# Patient Record
Sex: FEMALE | Race: WHITE | Employment: OTHER | URBAN - METROPOLITAN AREA
[De-identification: names, ages, dates, MRNs, and addresses within clinical notes are randomized per-mention and may not be internally consistent; named-entity substitution may affect disease eponyms.]

---

## 2017-05-30 PROBLEM — Z96.649 S/P HIP REPLACEMENT: Status: ACTIVE | Noted: 2017-05-30

## 2018-01-01 ENCOUNTER — APPOINTMENT (OUTPATIENT)
Dept: GENERAL RADIOLOGY | Age: 73
DRG: 871 | End: 2018-01-01
Payer: MEDICARE

## 2018-01-01 ENCOUNTER — APPOINTMENT (OUTPATIENT)
Dept: GENERAL RADIOLOGY | Age: 73
DRG: 949 | End: 2018-01-01
Attending: PHYSICAL MEDICINE & REHABILITATION
Payer: MEDICARE

## 2018-01-01 ENCOUNTER — APPOINTMENT (OUTPATIENT)
Dept: GENERAL RADIOLOGY | Age: 73
DRG: 478 | End: 2018-01-01
Attending: HOSPITALIST
Payer: MEDICARE

## 2018-01-01 ENCOUNTER — HOSPITAL ENCOUNTER (OUTPATIENT)
Dept: CT IMAGING | Age: 73
Discharge: HOME | End: 2018-09-08
Payer: MEDICARE

## 2018-01-01 ENCOUNTER — APPOINTMENT (OUTPATIENT)
Dept: CT IMAGING | Age: 73
DRG: 871 | End: 2018-01-01
Payer: MEDICARE

## 2018-01-01 ENCOUNTER — HOSPITAL ENCOUNTER (INPATIENT)
Age: 73
LOS: 12 days | Discharge: NURSING, REHAB OR LONG-TERM CARE | DRG: 478 | End: 2018-06-08
Attending: HOSPITALIST | Admitting: HOSPITALIST
Payer: MEDICARE

## 2018-01-01 ENCOUNTER — APPOINTMENT (OUTPATIENT)
Dept: MRI IMAGING | Age: 73
DRG: 478 | End: 2018-01-01
Attending: HOSPITALIST
Payer: MEDICARE

## 2018-01-01 ENCOUNTER — ANESTHESIA EVENT (OUTPATIENT)
Dept: OPERATING ROOM | Age: 73
DRG: 478 | End: 2018-01-01
Payer: MEDICARE

## 2018-01-01 ENCOUNTER — ANESTHESIA (OUTPATIENT)
Dept: OPERATING ROOM | Age: 73
DRG: 478 | End: 2018-01-01
Payer: MEDICARE

## 2018-01-01 ENCOUNTER — HOSPITAL ENCOUNTER (INPATIENT)
Age: 73
LOS: 7 days | DRG: 871 | End: 2018-12-14
Attending: EMERGENCY MEDICINE | Admitting: INTERNAL MEDICINE
Payer: MEDICARE

## 2018-01-01 ENCOUNTER — HOSPITAL ENCOUNTER (EMERGENCY)
Age: 73
Discharge: HOME | End: 2018-11-18
Attending: EMERGENCY MEDICINE
Payer: MEDICARE

## 2018-01-01 ENCOUNTER — HOSPITAL ENCOUNTER (OUTPATIENT)
Dept: RADIATION ONCOLOGY | Age: 73
Discharge: HOME | End: 2018-06-01
Payer: MEDICARE

## 2018-01-01 ENCOUNTER — HOSPITAL ENCOUNTER (INPATIENT)
Age: 73
LOS: 27 days | Discharge: HOME HEALTH | DRG: 949 | End: 2018-07-05
Attending: PHYSICAL MEDICINE & REHABILITATION | Admitting: PHYSICAL MEDICINE & REHABILITATION
Payer: MEDICARE

## 2018-01-01 ENCOUNTER — HOSPITAL ENCOUNTER (OUTPATIENT)
Age: 73
Discharge: HOME | End: 2018-11-15
Payer: MEDICARE

## 2018-01-01 VITALS
HEIGHT: 59 IN | WEIGHT: 159.7 LBS | SYSTOLIC BLOOD PRESSURE: 116 MMHG | TEMPERATURE: 97.8 F | OXYGEN SATURATION: 95 % | HEART RATE: 81 BPM | DIASTOLIC BLOOD PRESSURE: 60 MMHG | RESPIRATION RATE: 18 BRPM | BODY MASS INDEX: 32.2 KG/M2

## 2018-01-01 VITALS
OXYGEN SATURATION: 94 % | BODY MASS INDEX: 31.29 KG/M2 | TEMPERATURE: 97.6 F | HEART RATE: 80 BPM | HEIGHT: 59 IN | DIASTOLIC BLOOD PRESSURE: 61 MMHG | WEIGHT: 155.2 LBS | RESPIRATION RATE: 16 BRPM | SYSTOLIC BLOOD PRESSURE: 110 MMHG

## 2018-01-01 VITALS
DIASTOLIC BLOOD PRESSURE: 82 MMHG | TEMPERATURE: 98.2 F | SYSTOLIC BLOOD PRESSURE: 131 MMHG | OXYGEN SATURATION: 99 % | RESPIRATION RATE: 10 BRPM

## 2018-01-01 VITALS
RESPIRATION RATE: 29 BRPM | SYSTOLIC BLOOD PRESSURE: 79 MMHG | TEMPERATURE: 99.2 F | HEIGHT: 59 IN | WEIGHT: 150 LBS | BODY MASS INDEX: 30.24 KG/M2 | DIASTOLIC BLOOD PRESSURE: 46 MMHG | HEART RATE: 110 BPM | OXYGEN SATURATION: 73 %

## 2018-01-01 VITALS
HEIGHT: 59 IN | HEART RATE: 102 BPM | DIASTOLIC BLOOD PRESSURE: 67 MMHG | BODY MASS INDEX: 30.24 KG/M2 | WEIGHT: 150 LBS | SYSTOLIC BLOOD PRESSURE: 108 MMHG | TEMPERATURE: 99.1 F | OXYGEN SATURATION: 94 % | RESPIRATION RATE: 14 BRPM

## 2018-01-01 DIAGNOSIS — C34.90 MALIGNANT NEOPLASM OF LUNG, UNSPECIFIED LATERALITY, UNSPECIFIED PART OF LUNG (HCC): ICD-10-CM

## 2018-01-01 DIAGNOSIS — A41.9 SEPSIS, DUE TO UNSPECIFIED ORGANISM: ICD-10-CM

## 2018-01-01 DIAGNOSIS — N39.0 URINARY TRACT INFECTION ASSOCIATED WITH INDWELLING URETHRAL CATHETER, INITIAL ENCOUNTER (HCC): ICD-10-CM

## 2018-01-01 DIAGNOSIS — J96.01 ACUTE RESPIRATORY FAILURE WITH HYPOXIA (HCC): Primary | ICD-10-CM

## 2018-01-01 DIAGNOSIS — C79.51 METASTATIC CANCER TO SPINE (HCC): Primary | ICD-10-CM

## 2018-01-01 DIAGNOSIS — C79.51 METASTATIC CANCER TO SPINE (HCC): ICD-10-CM

## 2018-01-01 DIAGNOSIS — J18.9 PNEUMONIA DUE TO ORGANISM: ICD-10-CM

## 2018-01-01 DIAGNOSIS — T83.511A URINARY TRACT INFECTION ASSOCIATED WITH INDWELLING URETHRAL CATHETER, INITIAL ENCOUNTER (HCC): ICD-10-CM

## 2018-01-01 DIAGNOSIS — D49.7 SPINAL CORD NEOPLASM: ICD-10-CM

## 2018-01-01 DIAGNOSIS — T83.9XXA PROBLEM WITH URINARY CATHETER (HCC): Primary | ICD-10-CM

## 2018-01-01 LAB
ALBUMIN SERPL-MCNC: 1.8 G/DL (ref 3.5–5.2)
ALBUMIN SERPL-MCNC: 2.5 G/DL (ref 3.5–5.2)
ALBUMIN SERPL-MCNC: 3.3 G/DL (ref 3.5–5.2)
ALP BLD-CCNC: 53 U/L (ref 35–104)
ALP BLD-CCNC: 54 U/L (ref 35–104)
ALP BLD-CCNC: 75 U/L (ref 35–104)
ALT SERPL-CCNC: 20 U/L (ref 0–32)
ALT SERPL-CCNC: 6 U/L (ref 0–32)
ALT SERPL-CCNC: 6 U/L (ref 0–32)
ANION GAP SERPL CALCULATED.3IONS-SCNC: 12 MMOL/L (ref 7–16)
ANION GAP SERPL CALCULATED.3IONS-SCNC: 13 MMOL/L (ref 7–16)
ANION GAP SERPL CALCULATED.3IONS-SCNC: 13 MMOL/L (ref 7–16)
ANION GAP SERPL CALCULATED.3IONS-SCNC: 14 MMOL/L (ref 7–16)
ANION GAP SERPL CALCULATED.3IONS-SCNC: 15 MMOL/L (ref 7–16)
ANION GAP SERPL CALCULATED.3IONS-SCNC: 16 MMOL/L (ref 7–16)
ANION GAP SERPL CALCULATED.3IONS-SCNC: 7 MMOL/L (ref 7–16)
ANION GAP SERPL CALCULATED.3IONS-SCNC: 7 MMOL/L (ref 7–16)
ANION GAP SERPL CALCULATED.3IONS-SCNC: 8 MMOL/L (ref 7–16)
ANION GAP SERPL CALCULATED.3IONS-SCNC: 8 MMOL/L (ref 7–16)
ANION GAP SERPL CALCULATED.3IONS-SCNC: 9 MMOL/L (ref 7–16)
ANION GAP SERPL CALCULATED.3IONS-SCNC: 9 MMOL/L (ref 7–16)
ANISOCYTOSIS: ABNORMAL
AST SERPL-CCNC: 11 U/L (ref 0–31)
AST SERPL-CCNC: 14 U/L (ref 0–31)
AST SERPL-CCNC: 17 U/L (ref 0–31)
ATYPICAL LYMPHOCYTE RELATIVE PERCENT: 0.9 % (ref 0–4)
B.E.: -2.2 MMOL/L (ref -3–3)
B.E.: 0.2 MMOL/L (ref -3–3)
BACTERIA: ABNORMAL /HPF
BASOPHILS ABSOLUTE: 0 E9/L (ref 0–0.2)
BASOPHILS RELATIVE PERCENT: 0 % (ref 0–2)
BASOPHILS RELATIVE PERCENT: 0.1 % (ref 0–2)
BASOPHILS RELATIVE PERCENT: 0.2 % (ref 0–2)
BASOPHILS RELATIVE PERCENT: 0.2 % (ref 0–2)
BASOPHILS RELATIVE PERCENT: 0.3 % (ref 0–2)
BASOPHILS RELATIVE PERCENT: 0.3 % (ref 0–2)
BASOPHILS RELATIVE PERCENT: 0.5 % (ref 0–2)
BASOPHILS RELATIVE PERCENT: 0.8 % (ref 0–2)
BILIRUB SERPL-MCNC: 0.4 MG/DL (ref 0–1.2)
BILIRUB SERPL-MCNC: 0.7 MG/DL (ref 0–1.2)
BILIRUB SERPL-MCNC: <0.2 MG/DL (ref 0–1.2)
BILIRUBIN URINE: NEGATIVE
BLOOD CULTURE, ROUTINE: ABNORMAL
BLOOD, URINE: ABNORMAL
BLOOD, URINE: ABNORMAL
BLOOD, URINE: NEGATIVE
BUN BLDV-MCNC: 11 MG/DL (ref 8–23)
BUN BLDV-MCNC: 12 MG/DL (ref 8–23)
BUN BLDV-MCNC: 14 MG/DL (ref 8–23)
BUN BLDV-MCNC: 17 MG/DL (ref 8–23)
BUN BLDV-MCNC: 22 MG/DL (ref 8–23)
BUN BLDV-MCNC: 5 MG/DL (ref 8–23)
BUN BLDV-MCNC: 6 MG/DL (ref 8–23)
BUN BLDV-MCNC: 7 MG/DL (ref 8–23)
BUN BLDV-MCNC: 8 MG/DL (ref 8–23)
BUN BLDV-MCNC: 9 MG/DL (ref 8–23)
BURR CELLS: ABNORMAL
CALCIUM SERPL-MCNC: 5.9 MG/DL (ref 8.6–10.2)
CALCIUM SERPL-MCNC: 6 MG/DL (ref 8.6–10.2)
CALCIUM SERPL-MCNC: 6.1 MG/DL (ref 8.6–10.2)
CALCIUM SERPL-MCNC: 6.1 MG/DL (ref 8.6–10.2)
CALCIUM SERPL-MCNC: 6.2 MG/DL (ref 8.6–10.2)
CALCIUM SERPL-MCNC: 6.7 MG/DL (ref 8.6–10.2)
CALCIUM SERPL-MCNC: 6.8 MG/DL (ref 8.6–10.2)
CALCIUM SERPL-MCNC: 7 MG/DL (ref 8.6–10.2)
CALCIUM SERPL-MCNC: 7.2 MG/DL (ref 8.6–10.2)
CALCIUM SERPL-MCNC: 7.3 MG/DL (ref 8.6–10.2)
CALCIUM SERPL-MCNC: 7.8 MG/DL (ref 8.6–10.2)
CALCIUM SERPL-MCNC: 8 MG/DL (ref 8.6–10.2)
CALCIUM SERPL-MCNC: 8.5 MG/DL (ref 8.6–10.2)
CALCIUM SERPL-MCNC: 8.8 MG/DL (ref 8.6–10.2)
CHLORIDE BLD-SCNC: 100 MMOL/L (ref 98–107)
CHLORIDE BLD-SCNC: 102 MMOL/L (ref 98–107)
CHLORIDE BLD-SCNC: 103 MMOL/L (ref 98–107)
CHLORIDE BLD-SCNC: 103 MMOL/L (ref 98–107)
CHLORIDE BLD-SCNC: 104 MMOL/L (ref 98–107)
CHLORIDE BLD-SCNC: 93 MMOL/L (ref 98–107)
CHLORIDE BLD-SCNC: 94 MMOL/L (ref 98–107)
CHLORIDE BLD-SCNC: 96 MMOL/L (ref 98–107)
CHLORIDE BLD-SCNC: 97 MMOL/L (ref 98–107)
CHLORIDE BLD-SCNC: 99 MMOL/L (ref 98–107)
CLARITY: ABNORMAL
CO2: 22 MMOL/L (ref 22–29)
CO2: 22 MMOL/L (ref 22–29)
CO2: 23 MMOL/L (ref 22–29)
CO2: 24 MMOL/L (ref 22–29)
CO2: 27 MMOL/L (ref 22–29)
CO2: 27 MMOL/L (ref 22–29)
CO2: 28 MMOL/L (ref 22–29)
CO2: 28 MMOL/L (ref 22–29)
CO2: 29 MMOL/L (ref 22–29)
CO2: 29 MMOL/L (ref 22–29)
CO2: 30 MMOL/L (ref 22–29)
COHB: 0.8 % (ref 0–1.5)
COLOR: YELLOW
CREAT SERPL-MCNC: 0.2 MG/DL (ref 0.5–1)
CREAT SERPL-MCNC: 0.3 MG/DL (ref 0.5–1)
CREAT SERPL-MCNC: 0.4 MG/DL (ref 0.5–1)
CREAT SERPL-MCNC: 0.5 MG/DL (ref 0.5–1)
CREAT SERPL-MCNC: 0.6 MG/DL (ref 0.5–1)
CRITICAL: ABNORMAL
CULTURE, BLOOD 2: NORMAL
CULTURE, BLOOD 2: NORMAL
DATE ANALYZED: ABNORMAL
DATE OF COLLECTION: ABNORMAL
DELIVERY SYSTEMS: ABNORMAL
DEVICE: ABNORMAL
EKG ATRIAL RATE: 122 BPM
EKG P AXIS: 52 DEGREES
EKG P-R INTERVAL: 184 MS
EKG Q-T INTERVAL: 314 MS
EKG QRS DURATION: 66 MS
EKG QTC CALCULATION (BAZETT): 447 MS
EKG R AXIS: 39 DEGREES
EKG T AXIS: 27 DEGREES
EKG VENTRICULAR RATE: 122 BPM
EOSINOPHILS ABSOLUTE: 0 E9/L (ref 0.05–0.5)
EOSINOPHILS ABSOLUTE: 0.12 E9/L (ref 0.05–0.5)
EOSINOPHILS ABSOLUTE: 0.16 E9/L (ref 0.05–0.5)
EOSINOPHILS ABSOLUTE: 0.16 E9/L (ref 0.05–0.5)
EOSINOPHILS RELATIVE PERCENT: 0 % (ref 0–6)
EOSINOPHILS RELATIVE PERCENT: 0 % (ref 0–6)
EOSINOPHILS RELATIVE PERCENT: 0.7 % (ref 0–6)
EOSINOPHILS RELATIVE PERCENT: 0.9 % (ref 0–6)
EOSINOPHILS RELATIVE PERCENT: 1.3 % (ref 0–6)
EOSINOPHILS RELATIVE PERCENT: 1.4 % (ref 0–6)
FILM ARRAY ADENOVIRUS: NORMAL
FILM ARRAY BORDETELLA PERTUSSIS: NORMAL
FILM ARRAY CHLAMYDOPHILIA PNEUMONIAE: NORMAL
FILM ARRAY CORONAVIRUS 229E: NORMAL
FILM ARRAY CORONAVIRUS HKU1: NORMAL
FILM ARRAY CORONAVIRUS NL63: NORMAL
FILM ARRAY CORONAVIRUS OC43: NORMAL
FILM ARRAY INFLUENZA A VIRUS 09H1: NORMAL
FILM ARRAY INFLUENZA A VIRUS H1: NORMAL
FILM ARRAY INFLUENZA A VIRUS H3: NORMAL
FILM ARRAY INFLUENZA A VIRUS: NORMAL
FILM ARRAY INFLUENZA B: NORMAL
FILM ARRAY METAPNEUMOVIRUS: NORMAL
FILM ARRAY MYCOPLASMA PNEUMONIAE: NORMAL
FILM ARRAY PARAINFLUENZA VIRUS 1: NORMAL
FILM ARRAY PARAINFLUENZA VIRUS 2: NORMAL
FILM ARRAY PARAINFLUENZA VIRUS 3: NORMAL
FILM ARRAY PARAINFLUENZA VIRUS 4: NORMAL
FILM ARRAY RESPIRATORY SYNCITIAL VIRUS: NORMAL
FILM ARRAY RHINOVIRUS/ENTEROVIRUS: NORMAL
GFR AFRICAN AMERICAN: >60
GFR NON-AFRICAN AMERICAN: >60 ML/MIN/1.73
GLUCOSE BLD-MCNC: 107 MG/DL (ref 74–99)
GLUCOSE BLD-MCNC: 109 MG/DL (ref 74–109)
GLUCOSE BLD-MCNC: 109 MG/DL (ref 74–109)
GLUCOSE BLD-MCNC: 115 MG/DL (ref 74–109)
GLUCOSE BLD-MCNC: 115 MG/DL (ref 74–99)
GLUCOSE BLD-MCNC: 120 MG/DL (ref 74–109)
GLUCOSE BLD-MCNC: 123 MG/DL (ref 74–99)
GLUCOSE BLD-MCNC: 127 MG/DL (ref 74–99)
GLUCOSE BLD-MCNC: 148 MG/DL (ref 74–99)
GLUCOSE BLD-MCNC: 87 MG/DL (ref 74–99)
GLUCOSE BLD-MCNC: 89 MG/DL (ref 74–99)
GLUCOSE BLD-MCNC: 91 MG/DL (ref 74–109)
GLUCOSE BLD-MCNC: 94 MG/DL (ref 74–99)
GLUCOSE BLD-MCNC: 97 MG/DL (ref 74–109)
GLUCOSE BLD-MCNC: 98 MG/DL (ref 74–109)
GLUCOSE URINE: NEGATIVE MG/DL
HCO3 ARTERIAL: 23.4 MMOL/L (ref 22–26)
HCO3: 28 MMOL/L (ref 22–26)
HCT VFR BLD CALC: 27.2 % (ref 34–48)
HCT VFR BLD CALC: 31.5 % (ref 34–48)
HCT VFR BLD CALC: 32.6 % (ref 34–48)
HCT VFR BLD CALC: 32.6 % (ref 34–48)
HCT VFR BLD CALC: 33.1 % (ref 34–48)
HCT VFR BLD CALC: 33.7 % (ref 34–48)
HCT VFR BLD CALC: 33.9 % (ref 34–48)
HCT VFR BLD CALC: 34.9 % (ref 34–48)
HCT VFR BLD CALC: 35.3 % (ref 34–48)
HCT VFR BLD CALC: 38.4 % (ref 34–48)
HCT VFR BLD CALC: 39.4 % (ref 34–48)
HCT VFR BLD CALC: 41.2 % (ref 34–48)
HCT VFR BLD CALC: 41.6 % (ref 34–48)
HEMOGLOBIN: 10.9 G/DL (ref 11.5–15.5)
HEMOGLOBIN: 10.9 G/DL (ref 11.5–15.5)
HEMOGLOBIN: 11 G/DL (ref 11.5–15.5)
HEMOGLOBIN: 11.1 G/DL (ref 11.5–15.5)
HEMOGLOBIN: 12.4 G/DL (ref 11.5–15.5)
HEMOGLOBIN: 12.8 G/DL (ref 11.5–15.5)
HEMOGLOBIN: 13 G/DL (ref 11.5–15.5)
HEMOGLOBIN: 13.7 G/DL (ref 11.5–15.5)
HEMOGLOBIN: 8.1 G/DL (ref 11.5–15.5)
HEMOGLOBIN: 9.7 G/DL (ref 11.5–15.5)
HEMOGLOBIN: 9.8 G/DL (ref 11.5–15.5)
HHB: 12.2 % (ref 0–5)
HYPOCHROMIA: ABNORMAL
INR BLD: 1
KETONES, URINE: NEGATIVE MG/DL
LAB: ABNORMAL
LACTIC ACID: 0.6 MMOL/L (ref 0.5–2.2)
LEUKOCYTE ESTERASE, URINE: ABNORMAL
LV EF: 55 %
LVEF MODALITY: NORMAL
LYMPHOCYTES ABSOLUTE: 0.14 E9/L (ref 1.5–4)
LYMPHOCYTES ABSOLUTE: 0.26 E9/L (ref 1.5–4)
LYMPHOCYTES ABSOLUTE: 0.35 E9/L (ref 1.5–4)
LYMPHOCYTES ABSOLUTE: 0.4 E9/L (ref 1.5–4)
LYMPHOCYTES ABSOLUTE: 0.53 E9/L (ref 1.5–4)
LYMPHOCYTES ABSOLUTE: 0.54 E9/L (ref 1.5–4)
LYMPHOCYTES ABSOLUTE: 0.64 E9/L (ref 1.5–4)
LYMPHOCYTES ABSOLUTE: 0.72 E9/L (ref 1.5–4)
LYMPHOCYTES RELATIVE PERCENT: 0.9 % (ref 20–42)
LYMPHOCYTES RELATIVE PERCENT: 2.6 % (ref 20–42)
LYMPHOCYTES RELATIVE PERCENT: 4.3 % (ref 20–42)
LYMPHOCYTES RELATIVE PERCENT: 5 % (ref 20–42)
Lab: ABNORMAL
MAGNESIUM: 1.7 MG/DL (ref 1.6–2.6)
MAGNESIUM: 2 MG/DL (ref 1.6–2.6)
MAGNESIUM: 2.1 MG/DL (ref 1.6–2.6)
MAGNESIUM: 2.1 MG/DL (ref 1.6–2.6)
MAGNESIUM: 2.6 MG/DL (ref 1.6–2.6)
MCH RBC QN AUTO: 25.8 PG (ref 26–35)
MCH RBC QN AUTO: 25.8 PG (ref 26–35)
MCH RBC QN AUTO: 25.9 PG (ref 26–35)
MCH RBC QN AUTO: 26.3 PG (ref 26–35)
MCH RBC QN AUTO: 26.4 PG (ref 26–35)
MCH RBC QN AUTO: 26.4 PG (ref 26–35)
MCH RBC QN AUTO: 28.2 PG (ref 26–35)
MCH RBC QN AUTO: 28.2 PG (ref 26–35)
MCH RBC QN AUTO: 28.4 PG (ref 26–35)
MCH RBC QN AUTO: 28.6 PG (ref 26–35)
MCH RBC QN AUTO: 28.7 PG (ref 26–35)
MCH RBC QN AUTO: 28.8 PG (ref 26–35)
MCH RBC QN AUTO: 29 PG (ref 26–35)
MCHC RBC AUTO-ENTMCNC: 29.6 % (ref 32–34.5)
MCHC RBC AUTO-ENTMCNC: 29.8 % (ref 32–34.5)
MCHC RBC AUTO-ENTMCNC: 30.1 % (ref 32–34.5)
MCHC RBC AUTO-ENTMCNC: 30.1 % (ref 32–34.5)
MCHC RBC AUTO-ENTMCNC: 30.8 % (ref 32–34.5)
MCHC RBC AUTO-ENTMCNC: 31.2 % (ref 32–34.5)
MCHC RBC AUTO-ENTMCNC: 31.2 % (ref 32–34.5)
MCHC RBC AUTO-ENTMCNC: 31.6 % (ref 32–34.5)
MCHC RBC AUTO-ENTMCNC: 32.3 % (ref 32–34.5)
MCHC RBC AUTO-ENTMCNC: 32.3 % (ref 32–34.5)
MCHC RBC AUTO-ENTMCNC: 32.5 % (ref 32–34.5)
MCHC RBC AUTO-ENTMCNC: 32.7 % (ref 32–34.5)
MCHC RBC AUTO-ENTMCNC: 32.9 % (ref 32–34.5)
MCV RBC AUTO: 84.7 FL (ref 80–99.9)
MCV RBC AUTO: 85.8 FL (ref 80–99.9)
MCV RBC AUTO: 86 FL (ref 80–99.9)
MCV RBC AUTO: 86.6 FL (ref 80–99.9)
MCV RBC AUTO: 87.1 FL (ref 80–99.9)
MCV RBC AUTO: 87.6 FL (ref 80–99.9)
MCV RBC AUTO: 87.6 FL (ref 80–99.9)
MCV RBC AUTO: 87.8 FL (ref 80–99.9)
MCV RBC AUTO: 88.1 FL (ref 80–99.9)
MCV RBC AUTO: 88.5 FL (ref 80–99.9)
MCV RBC AUTO: 88.7 FL (ref 80–99.9)
MCV RBC AUTO: 89.4 FL (ref 80–99.9)
MCV RBC AUTO: 90.4 FL (ref 80–99.9)
METAMYELOCYTES RELATIVE PERCENT: 0.9 % (ref 0–1)
METAMYELOCYTES RELATIVE PERCENT: 2.6 % (ref 0–1)
METER GLUCOSE: 100 MG/DL (ref 70–110)
METER GLUCOSE: 101 MG/DL (ref 70–110)
METER GLUCOSE: 101 MG/DL (ref 70–110)
METER GLUCOSE: 102 MG/DL (ref 70–110)
METER GLUCOSE: 104 MG/DL (ref 70–110)
METER GLUCOSE: 107 MG/DL (ref 70–110)
METER GLUCOSE: 108 MG/DL (ref 70–110)
METER GLUCOSE: 122 MG/DL (ref 70–110)
METER GLUCOSE: 122 MG/DL (ref 70–110)
METER GLUCOSE: 126 MG/DL (ref 70–110)
METER GLUCOSE: 126 MG/DL (ref 70–110)
METER GLUCOSE: 127 MG/DL (ref 70–110)
METER GLUCOSE: 128 MG/DL (ref 70–110)
METER GLUCOSE: 131 MG/DL (ref 70–110)
METER GLUCOSE: 131 MG/DL (ref 70–110)
METER GLUCOSE: 137 MG/DL (ref 70–110)
METER GLUCOSE: 140 MG/DL (ref 70–110)
METER GLUCOSE: 160 MG/DL (ref 70–110)
METER GLUCOSE: 177 MG/DL (ref 70–110)
METER GLUCOSE: 192 MG/DL (ref 70–110)
METER GLUCOSE: 205 MG/DL (ref 70–110)
METER GLUCOSE: 85 MG/DL (ref 70–110)
METER GLUCOSE: 91 MG/DL (ref 70–110)
METER GLUCOSE: 94 MG/DL (ref 70–110)
METER GLUCOSE: 99 MG/DL (ref 70–110)
METHB: 0.1 % (ref 0–1.5)
MODE: ABNORMAL
MONOCYTES ABSOLUTE: 0.24 E9/L (ref 0.1–0.95)
MONOCYTES ABSOLUTE: 0.26 E9/L (ref 0.1–0.95)
MONOCYTES ABSOLUTE: 0.38 E9/L (ref 0.1–0.95)
MONOCYTES ABSOLUTE: 0.4 E9/L (ref 0.1–0.95)
MONOCYTES ABSOLUTE: 0.68 E9/L (ref 0.1–0.95)
MONOCYTES ABSOLUTE: 0.7 E9/L (ref 0.1–0.95)
MONOCYTES ABSOLUTE: 0.94 E9/L (ref 0.1–0.95)
MONOCYTES ABSOLUTE: 1.09 E9/L (ref 0.1–0.95)
MONOCYTES RELATIVE PERCENT: 1.7 % (ref 2–12)
MONOCYTES RELATIVE PERCENT: 2.6 % (ref 2–12)
MONOCYTES RELATIVE PERCENT: 2.6 % (ref 2–12)
MONOCYTES RELATIVE PERCENT: 3 % (ref 2–12)
MONOCYTES RELATIVE PERCENT: 4.3 % (ref 2–12)
MONOCYTES RELATIVE PERCENT: 5.3 % (ref 2–12)
MONOCYTES RELATIVE PERCENT: 6.1 % (ref 2–12)
MONOCYTES RELATIVE PERCENT: 7 % (ref 2–12)
MYELOCYTE PERCENT: 0.9 % (ref 0–0)
MYELOCYTE PERCENT: 0.9 % (ref 0–0)
MYELOCYTE PERCENT: 1.7 % (ref 0–0)
MYELOCYTE PERCENT: 1.7 % (ref 0–0)
NEUTROPHILS ABSOLUTE: 11.33 E9/L (ref 1.8–7.3)
NEUTROPHILS ABSOLUTE: 11.78 E9/L (ref 1.8–7.3)
NEUTROPHILS ABSOLUTE: 12.06 E9/L (ref 1.8–7.3)
NEUTROPHILS ABSOLUTE: 12.24 E9/L (ref 1.8–7.3)
NEUTROPHILS ABSOLUTE: 13.1 E9/L (ref 1.8–7.3)
NEUTROPHILS ABSOLUTE: 16.1 E9/L (ref 1.8–7.3)
NEUTROPHILS ABSOLUTE: 16.11 E9/L (ref 1.8–7.3)
NEUTROPHILS ABSOLUTE: 8.17 E9/L (ref 1.8–7.3)
NEUTROPHILS RELATIVE PERCENT: 87.8 % (ref 43–80)
NEUTROPHILS RELATIVE PERCENT: 87.8 % (ref 43–80)
NEUTROPHILS RELATIVE PERCENT: 90.4 % (ref 43–80)
NEUTROPHILS RELATIVE PERCENT: 90.4 % (ref 43–80)
NEUTROPHILS RELATIVE PERCENT: 92 % (ref 43–80)
NEUTROPHILS RELATIVE PERCENT: 93.9 % (ref 43–80)
NITRITE, URINE: NEGATIVE
O2 CONTENT: 12.8 ML/DL
O2 SATURATION: 87.7 % (ref 92–98.5)
O2 SATURATION: 99.6 % (ref 92–98.5)
O2HB: 86.9 % (ref 94–97)
OPERATOR ID: 4809
OPERATOR ID: ABNORMAL
ORGANISM: ABNORMAL
ORGANISM: ABNORMAL
OVALOCYTES: ABNORMAL
PATIENT TEMP: 37
PATIENT TEMP: 37 C
PCO2 (TEMP CORRECTED): 32.6 MMHG (ref 35–45)
PCO2: 82.7 MMHG (ref 35–45)
PDW BLD-RTO: 14.2 FL (ref 11.5–15)
PDW BLD-RTO: 14.2 FL (ref 11.5–15)
PDW BLD-RTO: 14.3 FL (ref 11.5–15)
PDW BLD-RTO: 14.4 FL (ref 11.5–15)
PDW BLD-RTO: 14.7 FL (ref 11.5–15)
PDW BLD-RTO: 14.8 FL (ref 11.5–15)
PDW BLD-RTO: 15 FL (ref 11.5–15)
PDW BLD-RTO: 20.3 FL (ref 11.5–15)
PDW BLD-RTO: 20.4 FL (ref 11.5–15)
PDW BLD-RTO: 20.7 FL (ref 11.5–15)
PDW BLD-RTO: 20.8 FL (ref 11.5–15)
PDW BLD-RTO: 20.9 FL (ref 11.5–15)
PDW BLD-RTO: 20.9 FL (ref 11.5–15)
PH (TEMPERATURE CORRECTED): 7.46 (ref 7.35–7.45)
PH BLOOD GAS: 7.15 (ref 7.35–7.45)
PH UA: 7 (ref 5–9)
PH UA: 7 (ref 5–9)
PH UA: 7.5 (ref 5–9)
PHOSPHORUS: 3.3 MG/DL (ref 2.5–4.5)
PLATELET # BLD: 241 E9/L (ref 130–450)
PLATELET # BLD: 248 E9/L (ref 130–450)
PLATELET # BLD: 257 E9/L (ref 130–450)
PLATELET # BLD: 263 E9/L (ref 130–450)
PLATELET # BLD: 267 E9/L (ref 130–450)
PLATELET # BLD: 276 E9/L (ref 130–450)
PLATELET # BLD: 286 E9/L (ref 130–450)
PLATELET # BLD: 289 E9/L (ref 130–450)
PLATELET # BLD: 289 E9/L (ref 130–450)
PLATELET # BLD: 321 E9/L (ref 130–450)
PLATELET # BLD: 325 E9/L (ref 130–450)
PLATELET # BLD: 327 E9/L (ref 130–450)
PLATELET # BLD: 394 E9/L (ref 130–450)
PMV BLD AUTO: 8.6 FL (ref 7–12)
PMV BLD AUTO: 8.7 FL (ref 7–12)
PMV BLD AUTO: 8.8 FL (ref 7–12)
PMV BLD AUTO: 8.9 FL (ref 7–12)
PMV BLD AUTO: 9.1 FL (ref 7–12)
PMV BLD AUTO: 9.1 FL (ref 7–12)
PMV BLD AUTO: 9.2 FL (ref 7–12)
PMV BLD AUTO: 9.4 FL (ref 7–12)
PMV BLD AUTO: 9.6 FL (ref 7–12)
PMV BLD AUTO: 9.6 FL (ref 7–12)
PO2 (TEMP CORRECTED): 171.8 MMHG (ref 60–80)
PO2: 69.3 MMHG (ref 60–100)
POC ANION GAP: 11 MMOL/L (ref 7–16)
POC BUN: 8 MG/DL (ref 8–23)
POC CHLORIDE: 98 MMOL/L (ref 100–108)
POC CREATININE: <0.3 MG/DL (ref 0.5–1)
POC POTASSIUM: 4 MMOL/L (ref 3.5–5)
POC SODIUM: 131 MMOL/L (ref 132–146)
POIKILOCYTES: ABNORMAL
POLYCHROMASIA: ABNORMAL
POTASSIUM REFLEX MAGNESIUM: 3.3 MMOL/L (ref 3.5–5)
POTASSIUM REFLEX MAGNESIUM: 3.5 MMOL/L (ref 3.5–5)
POTASSIUM REFLEX MAGNESIUM: 3.6 MMOL/L (ref 3.5–5)
POTASSIUM REFLEX MAGNESIUM: 4.4 MMOL/L (ref 3.5–5)
POTASSIUM REFLEX MAGNESIUM: 4.6 MMOL/L (ref 3.5–5)
POTASSIUM REFLEX MAGNESIUM: 5 MMOL/L (ref 3.5–5)
POTASSIUM SERPL-SCNC: 3.4 MMOL/L (ref 3.5–5)
POTASSIUM SERPL-SCNC: 3.8 MMOL/L (ref 3.5–5)
POTASSIUM SERPL-SCNC: 4 MMOL/L (ref 3.5–5)
POTASSIUM SERPL-SCNC: 4.2 MMOL/L (ref 3.5–5)
POTASSIUM SERPL-SCNC: 4.5 MMOL/L (ref 3.5–5)
POTASSIUM SERPL-SCNC: 4.9 MMOL/L (ref 3.5–5)
PROCALCITONIN: >100 NG/ML (ref 0–0.08)
PROTEIN UA: NEGATIVE MG/DL
PROTHROMBIN TIME: 11 SEC (ref 9.3–12.4)
RBC # BLD: 3.14 E12/L (ref 3.5–5.5)
RBC # BLD: 3.67 E12/L (ref 3.5–5.5)
RBC # BLD: 3.72 E12/L (ref 3.5–5.5)
RBC # BLD: 3.79 E12/L (ref 3.5–5.5)
RBC # BLD: 3.8 E12/L (ref 3.5–5.5)
RBC # BLD: 3.84 E12/L (ref 3.5–5.5)
RBC # BLD: 3.86 E12/L (ref 3.5–5.5)
RBC # BLD: 3.87 E12/L (ref 3.5–5.5)
RBC # BLD: 4.17 E12/L (ref 3.5–5.5)
RBC # BLD: 4.33 E12/L (ref 3.5–5.5)
RBC # BLD: 4.45 E12/L (ref 3.5–5.5)
RBC # BLD: 4.61 E12/L (ref 3.5–5.5)
RBC # BLD: 4.72 E12/L (ref 3.5–5.5)
RBC UA: ABNORMAL /HPF (ref 0–2)
SODIUM BLD-SCNC: 129 MMOL/L (ref 132–146)
SODIUM BLD-SCNC: 130 MMOL/L (ref 132–146)
SODIUM BLD-SCNC: 133 MMOL/L (ref 132–146)
SODIUM BLD-SCNC: 135 MMOL/L (ref 132–146)
SODIUM BLD-SCNC: 135 MMOL/L (ref 132–146)
SODIUM BLD-SCNC: 137 MMOL/L (ref 132–146)
SODIUM BLD-SCNC: 137 MMOL/L (ref 132–146)
SODIUM BLD-SCNC: 138 MMOL/L (ref 132–146)
SODIUM BLD-SCNC: 140 MMOL/L (ref 132–146)
SODIUM BLD-SCNC: 140 MMOL/L (ref 132–146)
SODIUM BLD-SCNC: 141 MMOL/L (ref 132–146)
SODIUM BLD-SCNC: 142 MMOL/L (ref 132–146)
SOURCE, BLOOD GAS: ABNORMAL
SOURCE, BLOOD GAS: ABNORMAL
SPECIFIC GRAVITY UA: 1.01 (ref 1–1.03)
SPECIFIC GRAVITY UA: 1.01 (ref 1–1.03)
SPECIFIC GRAVITY UA: <=1.005 (ref 1–1.03)
TARGET CELLS: ABNORMAL
THB: 10.4 G/DL (ref 11.5–16.5)
TIME ANALYZED: 1448
TOTAL PROTEIN: 4 G/DL (ref 6.4–8.3)
TOTAL PROTEIN: 4.7 G/DL (ref 6.4–8.3)
TOTAL PROTEIN: 5.7 G/DL (ref 6.4–8.3)
TROPONIN: 0.02 NG/ML (ref 0–0.03)
TROPONIN: <0.01 NG/ML (ref 0–0.03)
URINE CULTURE, ROUTINE: NORMAL
URINE CULTURE, ROUTINE: NORMAL
UROBILINOGEN, URINE: 0.2 E.U./DL
UROBILINOGEN, URINE: 1 E.U./DL
UROBILINOGEN, URINE: 1 E.U./DL
VACUOLATED NEUTROPHILS: ABNORMAL
VANCOMYCIN TROUGH: 9.3 MCG/ML (ref 5–16)
WBC # BLD: 10.4 E9/L (ref 4.5–11.5)
WBC # BLD: 10.5 E9/L (ref 4.5–11.5)
WBC # BLD: 11.8 E9/L (ref 4.5–11.5)
WBC # BLD: 12.1 E9/L (ref 4.5–11.5)
WBC # BLD: 12.8 E9/L (ref 4.5–11.5)
WBC # BLD: 13.4 E9/L (ref 4.5–11.5)
WBC # BLD: 13.5 E9/L (ref 4.5–11.5)
WBC # BLD: 13.6 E9/L (ref 4.5–11.5)
WBC # BLD: 17 E9/L (ref 4.5–11.5)
WBC # BLD: 17.5 E9/L (ref 4.5–11.5)
WBC # BLD: 18.1 E9/L (ref 4.5–11.5)
WBC # BLD: 7.8 E9/L (ref 4.5–11.5)
WBC # BLD: 8.6 E9/L (ref 4.5–11.5)
WBC UA: >20 /HPF (ref 0–5)
WBC UA: ABNORMAL /HPF (ref 0–5)
WBC UA: ABNORMAL /HPF (ref 0–5)

## 2018-01-01 PROCEDURE — 6370000000 HC RX 637 (ALT 250 FOR IP): Performed by: HOSPITALIST

## 2018-01-01 PROCEDURE — 97530 THERAPEUTIC ACTIVITIES: CPT

## 2018-01-01 PROCEDURE — 82962 GLUCOSE BLOOD TEST: CPT

## 2018-01-01 PROCEDURE — 3700000001 HC ADD 15 MINUTES (ANESTHESIA): Performed by: NEUROLOGICAL SURGERY

## 2018-01-01 PROCEDURE — 6360000002 HC RX W HCPCS: Performed by: NURSE PRACTITIONER

## 2018-01-01 PROCEDURE — 6360000002 HC RX W HCPCS: Performed by: INTERNAL MEDICINE

## 2018-01-01 PROCEDURE — 6370000000 HC RX 637 (ALT 250 FOR IP): Performed by: PHYSICAL MEDICINE & REHABILITATION

## 2018-01-01 PROCEDURE — 85610 PROTHROMBIN TIME: CPT

## 2018-01-01 PROCEDURE — P9046 ALBUMIN (HUMAN), 25%, 20 ML: HCPCS | Performed by: HOSPITALIST

## 2018-01-01 PROCEDURE — 87088 URINE BACTERIA CULTURE: CPT

## 2018-01-01 PROCEDURE — 81001 URINALYSIS AUTO W/SCOPE: CPT

## 2018-01-01 PROCEDURE — 97110 THERAPEUTIC EXERCISES: CPT

## 2018-01-01 PROCEDURE — 94640 AIRWAY INHALATION TREATMENT: CPT

## 2018-01-01 PROCEDURE — 97168 OT RE-EVAL EST PLAN CARE: CPT

## 2018-01-01 PROCEDURE — 36415 COLL VENOUS BLD VENIPUNCTURE: CPT

## 2018-01-01 PROCEDURE — 88311 DECALCIFY TISSUE: CPT

## 2018-01-01 PROCEDURE — 2580000003 HC RX 258: Performed by: PHYSICAL MEDICINE & REHABILITATION

## 2018-01-01 PROCEDURE — 2580000003 HC RX 258: Performed by: HOSPITALIST

## 2018-01-01 PROCEDURE — 99233 SBSQ HOSP IP/OBS HIGH 50: CPT | Performed by: HOSPITALIST

## 2018-01-01 PROCEDURE — 84100 ASSAY OF PHOSPHORUS: CPT

## 2018-01-01 PROCEDURE — 87040 BLOOD CULTURE FOR BACTERIA: CPT

## 2018-01-01 PROCEDURE — 6370000000 HC RX 637 (ALT 250 FOR IP): Performed by: INTERNAL MEDICINE

## 2018-01-01 PROCEDURE — 6370000000 HC RX 637 (ALT 250 FOR IP): Performed by: STUDENT IN AN ORGANIZED HEALTH CARE EDUCATION/TRAINING PROGRAM

## 2018-01-01 PROCEDURE — 97530 THERAPEUTIC ACTIVITIES: CPT | Performed by: OCCUPATIONAL THERAPY ASSISTANT

## 2018-01-01 PROCEDURE — 6370000000 HC RX 637 (ALT 250 FOR IP): Performed by: NURSE PRACTITIONER

## 2018-01-01 PROCEDURE — 97110 THERAPEUTIC EXERCISES: CPT | Performed by: OCCUPATIONAL THERAPY ASSISTANT

## 2018-01-01 PROCEDURE — 6370000000 HC RX 637 (ALT 250 FOR IP): Performed by: FAMILY MEDICINE

## 2018-01-01 PROCEDURE — G8987 SELF CARE CURRENT STATUS: HCPCS

## 2018-01-01 PROCEDURE — 87077 CULTURE AEROBIC IDENTIFY: CPT

## 2018-01-01 PROCEDURE — 6360000002 HC RX W HCPCS

## 2018-01-01 PROCEDURE — 99233 SBSQ HOSP IP/OBS HIGH 50: CPT | Performed by: EMERGENCY MEDICINE

## 2018-01-01 PROCEDURE — 2700000000 HC OXYGEN THERAPY PER DAY

## 2018-01-01 PROCEDURE — 85025 COMPLETE CBC W/AUTO DIFF WBC: CPT

## 2018-01-01 PROCEDURE — 94761 N-INVAS EAR/PLS OXIMETRY MLT: CPT

## 2018-01-01 PROCEDURE — 36600 WITHDRAWAL OF ARTERIAL BLOOD: CPT

## 2018-01-01 PROCEDURE — 2500000003 HC RX 250 WO HCPCS

## 2018-01-01 PROCEDURE — 1280000000 HC REHAB R&B

## 2018-01-01 PROCEDURE — 2060000000 HC ICU INTERMEDIATE R&B

## 2018-01-01 PROCEDURE — 99233 SBSQ HOSP IP/OBS HIGH 50: CPT | Performed by: INTERNAL MEDICINE

## 2018-01-01 PROCEDURE — 6360000002 HC RX W HCPCS: Performed by: PHYSICAL MEDICINE & REHABILITATION

## 2018-01-01 PROCEDURE — 94150 VITAL CAPACITY TEST: CPT

## 2018-01-01 PROCEDURE — 87486 CHLMYD PNEUM DNA AMP PROBE: CPT

## 2018-01-01 PROCEDURE — 97162 PT EVAL MOD COMPLEX 30 MIN: CPT

## 2018-01-01 PROCEDURE — 80051 ELECTROLYTE PANEL: CPT

## 2018-01-01 PROCEDURE — 74018 RADEX ABDOMEN 1 VIEW: CPT

## 2018-01-01 PROCEDURE — 1200000000 HC SEMI PRIVATE

## 2018-01-01 PROCEDURE — 6360000002 HC RX W HCPCS: Performed by: FAMILY MEDICINE

## 2018-01-01 PROCEDURE — 7100000001 HC PACU RECOVERY - ADDTL 15 MIN: Performed by: NEUROLOGICAL SURGERY

## 2018-01-01 PROCEDURE — 82947 ASSAY GLUCOSE BLOOD QUANT: CPT

## 2018-01-01 PROCEDURE — G8979 MOBILITY GOAL STATUS: HCPCS

## 2018-01-01 PROCEDURE — 2580000003 HC RX 258: Performed by: INTERNAL MEDICINE

## 2018-01-01 PROCEDURE — 0PB40ZX EXCISION OF THORACIC VERTEBRA, OPEN APPROACH, DIAGNOSTIC: ICD-10-PCS | Performed by: NEUROLOGICAL SURGERY

## 2018-01-01 PROCEDURE — 83735 ASSAY OF MAGNESIUM: CPT

## 2018-01-01 PROCEDURE — 6360000002 HC RX W HCPCS: Performed by: NEUROLOGICAL SURGERY

## 2018-01-01 PROCEDURE — 2000000000 HC ICU R&B

## 2018-01-01 PROCEDURE — 97535 SELF CARE MNGMENT TRAINING: CPT | Performed by: OCCUPATIONAL THERAPY ASSISTANT

## 2018-01-01 PROCEDURE — 94660 CPAP INITIATION&MGMT: CPT

## 2018-01-01 PROCEDURE — 87581 M.PNEUMON DNA AMP PROBE: CPT

## 2018-01-01 PROCEDURE — 6360000002 HC RX W HCPCS: Performed by: ANESTHESIOLOGY

## 2018-01-01 PROCEDURE — 6360000002 HC RX W HCPCS: Performed by: HOSPITALIST

## 2018-01-01 PROCEDURE — 97535 SELF CARE MNGMENT TRAINING: CPT

## 2018-01-01 PROCEDURE — 99223 1ST HOSP IP/OBS HIGH 75: CPT | Performed by: NURSE PRACTITIONER

## 2018-01-01 PROCEDURE — 72157 MRI CHEST SPINE W/O & W/DYE: CPT

## 2018-01-01 PROCEDURE — 2580000003 HC RX 258: Performed by: NURSE PRACTITIONER

## 2018-01-01 PROCEDURE — 99285 EMERGENCY DEPT VISIT HI MDM: CPT

## 2018-01-01 PROCEDURE — 71045 X-RAY EXAM CHEST 1 VIEW: CPT

## 2018-01-01 PROCEDURE — 72158 MRI LUMBAR SPINE W/O & W/DYE: CPT

## 2018-01-01 PROCEDURE — 6370000000 HC RX 637 (ALT 250 FOR IP): Performed by: NEUROLOGICAL SURGERY

## 2018-01-01 PROCEDURE — 2500000003 HC RX 250 WO HCPCS: Performed by: HOSPITALIST

## 2018-01-01 PROCEDURE — 6370000000 HC RX 637 (ALT 250 FOR IP): Performed by: CLINICAL NURSE SPECIALIST

## 2018-01-01 PROCEDURE — 87186 SC STD MICRODIL/AGAR DIL: CPT

## 2018-01-01 PROCEDURE — 00NX0ZZ RELEASE THORACIC SPINAL CORD, OPEN APPROACH: ICD-10-PCS | Performed by: NEUROLOGICAL SURGERY

## 2018-01-01 PROCEDURE — 36556 INSERT NON-TUNNEL CV CATH: CPT

## 2018-01-01 PROCEDURE — 80053 COMPREHEN METABOLIC PANEL: CPT

## 2018-01-01 PROCEDURE — L0464 TLSO 4MOD SACRO-SCAP PRE: HCPCS

## 2018-01-01 PROCEDURE — 6360000004 HC RX CONTRAST MEDICATION: Performed by: RADIOLOGY

## 2018-01-01 PROCEDURE — 88307 TISSUE EXAM BY PATHOLOGIST: CPT

## 2018-01-01 PROCEDURE — 72128 CT CHEST SPINE W/O DYE: CPT

## 2018-01-01 PROCEDURE — 94760 N-INVAS EAR/PLS OXIMETRY 1: CPT

## 2018-01-01 PROCEDURE — 85027 COMPLETE CBC AUTOMATED: CPT

## 2018-01-01 PROCEDURE — 80048 BASIC METABOLIC PNL TOTAL CA: CPT

## 2018-01-01 PROCEDURE — 3600000004 HC SURGERY LEVEL 4 BASE: Performed by: NEUROLOGICAL SURGERY

## 2018-01-01 PROCEDURE — 99283 EMERGENCY DEPT VISIT LOW MDM: CPT

## 2018-01-01 PROCEDURE — 87633 RESP VIRUS 12-25 TARGETS: CPT

## 2018-01-01 PROCEDURE — 51798 US URINE CAPACITY MEASURE: CPT

## 2018-01-01 PROCEDURE — C8929 TTE W OR WO FOL WCON,DOPPLER: HCPCS

## 2018-01-01 PROCEDURE — P9046 ALBUMIN (HUMAN), 25%, 20 ML: HCPCS

## 2018-01-01 PROCEDURE — 92610 EVALUATE SWALLOWING FUNCTION: CPT | Performed by: SPEECH-LANGUAGE PATHOLOGIST

## 2018-01-01 PROCEDURE — 82803 BLOOD GASES ANY COMBINATION: CPT

## 2018-01-01 PROCEDURE — 72070 X-RAY EXAM THORAC SPINE 2VWS: CPT

## 2018-01-01 PROCEDURE — 92526 ORAL FUNCTION THERAPY: CPT | Performed by: SPEECH-LANGUAGE PATHOLOGIST

## 2018-01-01 PROCEDURE — 84484 ASSAY OF TROPONIN QUANT: CPT

## 2018-01-01 PROCEDURE — 83605 ASSAY OF LACTIC ACID: CPT

## 2018-01-01 PROCEDURE — 2580000003 HC RX 258: Performed by: STUDENT IN AN ORGANIZED HEALTH CARE EDUCATION/TRAINING PROGRAM

## 2018-01-01 PROCEDURE — 97166 OT EVAL MOD COMPLEX 45 MIN: CPT

## 2018-01-01 PROCEDURE — 2580000003 HC RX 258

## 2018-01-01 PROCEDURE — G8988 SELF CARE GOAL STATUS: HCPCS

## 2018-01-01 PROCEDURE — 99222 1ST HOSP IP/OBS MODERATE 55: CPT | Performed by: CLINICAL NURSE SPECIALIST

## 2018-01-01 PROCEDURE — 71275 CT ANGIOGRAPHY CHEST: CPT

## 2018-01-01 PROCEDURE — 95940 IONM IN OPERATNG ROOM 15 MIN: CPT | Performed by: AUDIOLOGIST

## 2018-01-01 PROCEDURE — 71046 X-RAY EXAM CHEST 2 VIEWS: CPT

## 2018-01-01 PROCEDURE — A9577 INJ MULTIHANCE: HCPCS | Performed by: RADIOLOGY

## 2018-01-01 PROCEDURE — 3209999900 FLUORO FOR SURGICAL PROCEDURES

## 2018-01-01 PROCEDURE — 51702 INSERT TEMP BLADDER CATH: CPT

## 2018-01-01 PROCEDURE — 97167 OT EVAL HIGH COMPLEX 60 MIN: CPT

## 2018-01-01 PROCEDURE — 99232 SBSQ HOSP IP/OBS MODERATE 35: CPT | Performed by: SURGERY

## 2018-01-01 PROCEDURE — 3700000000 HC ANESTHESIA ATTENDED CARE: Performed by: NEUROLOGICAL SURGERY

## 2018-01-01 PROCEDURE — 87081 CULTURE SCREEN ONLY: CPT

## 2018-01-01 PROCEDURE — 7100000000 HC PACU RECOVERY - FIRST 15 MIN: Performed by: NEUROLOGICAL SURGERY

## 2018-01-01 PROCEDURE — 94664 DEMO&/EVAL PT USE INHALER: CPT

## 2018-01-01 PROCEDURE — 87798 DETECT AGENT NOS DNA AMP: CPT

## 2018-01-01 PROCEDURE — 87149 DNA/RNA DIRECT PROBE: CPT

## 2018-01-01 PROCEDURE — 99238 HOSP IP/OBS DSCHRG MGMT 30/<: CPT | Performed by: HOSPITALIST

## 2018-01-01 PROCEDURE — 72156 MRI NECK SPINE W/O & W/DYE: CPT

## 2018-01-01 PROCEDURE — 84145 PROCALCITONIN (PCT): CPT

## 2018-01-01 PROCEDURE — 99223 1ST HOSP IP/OBS HIGH 75: CPT | Performed by: RADIOLOGY

## 2018-01-01 PROCEDURE — 97161 PT EVAL LOW COMPLEX 20 MIN: CPT

## 2018-01-01 PROCEDURE — 80202 ASSAY OF VANCOMYCIN: CPT

## 2018-01-01 PROCEDURE — 74022 RADEX COMPL AQT ABD SERIES: CPT

## 2018-01-01 PROCEDURE — 95938 SOMATOSENSORY TESTING: CPT | Performed by: AUDIOLOGIST

## 2018-01-01 PROCEDURE — 99232 SBSQ HOSP IP/OBS MODERATE 35: CPT | Performed by: PHYSICAL MEDICINE & REHABILITATION

## 2018-01-01 PROCEDURE — G8978 MOBILITY CURRENT STATUS: HCPCS

## 2018-01-01 PROCEDURE — 82805 BLOOD GASES W/O2 SATURATION: CPT

## 2018-01-01 PROCEDURE — 3600000014 HC SURGERY LEVEL 4 ADDTL 15MIN: Performed by: NEUROLOGICAL SURGERY

## 2018-01-01 PROCEDURE — 96375 TX/PRO/DX INJ NEW DRUG ADDON: CPT

## 2018-01-01 PROCEDURE — 82565 ASSAY OF CREATININE: CPT

## 2018-01-01 PROCEDURE — 84520 ASSAY OF UREA NITROGEN: CPT

## 2018-01-01 PROCEDURE — 2500000003 HC RX 250 WO HCPCS: Performed by: NEUROLOGICAL SURGERY

## 2018-01-01 PROCEDURE — 05HP33Z INSERTION OF INFUSION DEVICE INTO RIGHT EXTERNAL JUGULAR VEIN, PERCUTANEOUS APPROACH: ICD-10-PCS | Performed by: EMERGENCY MEDICINE

## 2018-01-01 PROCEDURE — 2500000003 HC RX 250 WO HCPCS: Performed by: STUDENT IN AN ORGANIZED HEALTH CARE EDUCATION/TRAINING PROGRAM

## 2018-01-01 PROCEDURE — 96365 THER/PROPH/DIAG IV INF INIT: CPT

## 2018-01-01 PROCEDURE — 93005 ELECTROCARDIOGRAM TRACING: CPT

## 2018-01-01 PROCEDURE — 6360000004 HC RX CONTRAST MEDICATION: Performed by: NURSE PRACTITIONER

## 2018-01-01 PROCEDURE — 97164 PT RE-EVAL EST PLAN CARE: CPT

## 2018-01-01 PROCEDURE — 6360000002 HC RX W HCPCS: Performed by: STUDENT IN AN ORGANIZED HEALTH CARE EDUCATION/TRAINING PROGRAM

## 2018-01-01 PROCEDURE — 97112 NEUROMUSCULAR REEDUCATION: CPT

## 2018-01-01 RX ORDER — GABAPENTIN 300 MG/1
600 CAPSULE ORAL NIGHTLY
Status: DISCONTINUED | OUTPATIENT
Start: 2018-01-01 | End: 2018-01-01

## 2018-01-01 RX ORDER — PROPOFOL 10 MG/ML
INJECTION, EMULSION INTRAVENOUS PRN
Status: DISCONTINUED | OUTPATIENT
Start: 2018-01-01 | End: 2018-01-01 | Stop reason: SDUPTHER

## 2018-01-01 RX ORDER — METHYLPREDNISOLONE 4 MG/1
4 TABLET ORAL
Status: COMPLETED | OUTPATIENT
Start: 2018-01-01 | End: 2018-01-01

## 2018-01-01 RX ORDER — OXYCODONE HYDROCHLORIDE AND ACETAMINOPHEN 5; 325 MG/1; MG/1
1 TABLET ORAL EVERY 4 HOURS PRN
Refills: 0 | Status: ON HOLD | OUTPATIENT
Start: 2018-01-01 | End: 2018-01-01 | Stop reason: HOSPADM

## 2018-01-01 RX ORDER — OXYCODONE AND ACETAMINOPHEN 10; 325 MG/1; MG/1
1 TABLET ORAL EVERY 4 HOURS
COMMUNITY

## 2018-01-01 RX ORDER — POTASSIUM CHLORIDE 20 MEQ/1
40 TABLET, EXTENDED RELEASE ORAL ONCE
Status: COMPLETED | OUTPATIENT
Start: 2018-01-01 | End: 2018-01-01

## 2018-01-01 RX ORDER — ONDANSETRON 2 MG/ML
4 INJECTION INTRAMUSCULAR; INTRAVENOUS
Status: DISCONTINUED | OUTPATIENT
Start: 2018-01-01 | End: 2018-01-01

## 2018-01-01 RX ORDER — SENNA AND DOCUSATE SODIUM 50; 8.6 MG/1; MG/1
2 TABLET, FILM COATED ORAL 2 TIMES DAILY
Status: DISCONTINUED | OUTPATIENT
Start: 2018-01-01 | End: 2018-01-01 | Stop reason: HOSPADM

## 2018-01-01 RX ORDER — DEXAMETHASONE SODIUM PHOSPHATE 10 MG/ML
10 INJECTION INTRAMUSCULAR; INTRAVENOUS ONCE
Status: COMPLETED | OUTPATIENT
Start: 2018-01-01 | End: 2018-01-01

## 2018-01-01 RX ORDER — SENNA AND DOCUSATE SODIUM 50; 8.6 MG/1; MG/1
2 TABLET, FILM COATED ORAL DAILY PRN
Status: DISCONTINUED | OUTPATIENT
Start: 2018-01-01 | End: 2018-01-01 | Stop reason: HOSPADM

## 2018-01-01 RX ORDER — PANTOPRAZOLE SODIUM 40 MG/1
40 TABLET, DELAYED RELEASE ORAL
Qty: 30 TABLET | Refills: 3 | DISCHARGE
Start: 2018-01-01 | End: 2018-01-01 | Stop reason: ALTCHOICE

## 2018-01-01 RX ORDER — METHYLPREDNISOLONE 4 MG/1
4 TABLET ORAL
Qty: 6 TABLET | Refills: 0 | Status: ON HOLD | DISCHARGE
Start: 2018-01-01 | End: 2018-01-01 | Stop reason: HOSPADM

## 2018-01-01 RX ORDER — LANOLIN ALCOHOL/MO/W.PET/CERES
400 CREAM (GRAM) TOPICAL DAILY
Status: DISCONTINUED | OUTPATIENT
Start: 2018-01-01 | End: 2018-01-01

## 2018-01-01 RX ORDER — SODIUM CHLORIDE 0.9 % (FLUSH) 0.9 %
10 SYRINGE (ML) INJECTION PRN
Status: DISCONTINUED | OUTPATIENT
Start: 2018-01-01 | End: 2018-01-01 | Stop reason: HOSPADM

## 2018-01-01 RX ORDER — 0.9 % SODIUM CHLORIDE 0.9 %
30 INTRAVENOUS SOLUTION INTRAVENOUS ONCE
Status: COMPLETED | OUTPATIENT
Start: 2018-01-01 | End: 2018-01-01

## 2018-01-01 RX ORDER — MORPHINE SULFATE 10 MG/.5ML
10 SOLUTION ORAL EVERY 4 HOURS
Status: DISCONTINUED | OUTPATIENT
Start: 2018-01-01 | End: 2018-01-01

## 2018-01-01 RX ORDER — FUROSEMIDE 10 MG/ML
20 INJECTION INTRAMUSCULAR; INTRAVENOUS ONCE
Status: COMPLETED | OUTPATIENT
Start: 2018-01-01 | End: 2018-01-01

## 2018-01-01 RX ORDER — GABAPENTIN 300 MG/1
600 CAPSULE ORAL NIGHTLY
COMMUNITY

## 2018-01-01 RX ORDER — METOCLOPRAMIDE HYDROCHLORIDE 5 MG/ML
5 INJECTION INTRAMUSCULAR; INTRAVENOUS EVERY 12 HOURS
Status: DISCONTINUED | OUTPATIENT
Start: 2018-01-01 | End: 2018-01-01 | Stop reason: HOSPADM

## 2018-01-01 RX ORDER — M-VIT,TX,IRON,MINS/CALC/FOLIC 27MG-0.4MG
1 TABLET ORAL DAILY
Status: DISCONTINUED | OUTPATIENT
Start: 2018-01-01 | End: 2018-01-01 | Stop reason: HOSPADM

## 2018-01-01 RX ORDER — POLYETHYLENE GLYCOL 3350 17 G/17G
17 POWDER, FOR SOLUTION ORAL DAILY PRN
Status: DISCONTINUED | OUTPATIENT
Start: 2018-01-01 | End: 2018-01-01

## 2018-01-01 RX ORDER — HYDROCODONE BITARTRATE AND ACETAMINOPHEN 5; 325 MG/1; MG/1
2 TABLET ORAL EVERY 6 HOURS PRN
Status: DISCONTINUED | OUTPATIENT
Start: 2018-01-01 | End: 2018-01-01

## 2018-01-01 RX ORDER — SODIUM CHLORIDE 0.9 % (FLUSH) 0.9 %
10 SYRINGE (ML) INJECTION 2 TIMES DAILY
Status: DISCONTINUED | OUTPATIENT
Start: 2018-01-01 | End: 2018-01-01 | Stop reason: HOSPADM

## 2018-01-01 RX ORDER — FENTANYL CITRATE 50 UG/ML
25 INJECTION, SOLUTION INTRAMUSCULAR; INTRAVENOUS ONCE
Status: COMPLETED | OUTPATIENT
Start: 2018-01-01 | End: 2018-01-01

## 2018-01-01 RX ORDER — POTASSIUM CHLORIDE 20 MEQ/1
40 TABLET, EXTENDED RELEASE ORAL
Status: DISCONTINUED | OUTPATIENT
Start: 2018-01-01 | End: 2018-01-01

## 2018-01-01 RX ORDER — DEXAMETHASONE SODIUM PHOSPHATE 4 MG/ML
4 INJECTION, SOLUTION INTRA-ARTICULAR; INTRALESIONAL; INTRAMUSCULAR; INTRAVENOUS; SOFT TISSUE EVERY 12 HOURS
Status: DISCONTINUED | OUTPATIENT
Start: 2018-01-01 | End: 2018-01-01

## 2018-01-01 RX ORDER — AMLODIPINE BESYLATE 5 MG/1
5 TABLET ORAL DAILY
Status: DISCONTINUED | OUTPATIENT
Start: 2018-01-01 | End: 2018-01-01

## 2018-01-01 RX ORDER — METHYLPREDNISOLONE 4 MG/1
8 TABLET ORAL NIGHTLY
Status: COMPLETED | OUTPATIENT
Start: 2018-01-01 | End: 2018-01-01

## 2018-01-01 RX ORDER — PANTOPRAZOLE SODIUM 40 MG/1
40 TABLET, DELAYED RELEASE ORAL
Status: DISCONTINUED | OUTPATIENT
Start: 2018-01-01 | End: 2018-01-01 | Stop reason: HOSPADM

## 2018-01-01 RX ORDER — OXYCODONE HYDROCHLORIDE AND ACETAMINOPHEN 5; 325 MG/1; MG/1
1 TABLET ORAL EVERY 4 HOURS PRN
Status: DISCONTINUED | OUTPATIENT
Start: 2018-01-01 | End: 2018-01-01

## 2018-01-01 RX ORDER — FENTANYL CITRATE 50 UG/ML
50 INJECTION, SOLUTION INTRAMUSCULAR; INTRAVENOUS EVERY 5 MIN PRN
Status: DISCONTINUED | OUTPATIENT
Start: 2018-01-01 | End: 2018-01-01

## 2018-01-01 RX ORDER — SODIUM CHLORIDE, SODIUM LACTATE, POTASSIUM CHLORIDE, CALCIUM CHLORIDE 600; 310; 30; 20 MG/100ML; MG/100ML; MG/100ML; MG/100ML
INJECTION, SOLUTION INTRAVENOUS CONTINUOUS PRN
Status: DISCONTINUED | OUTPATIENT
Start: 2018-01-01 | End: 2018-01-01 | Stop reason: SDUPTHER

## 2018-01-01 RX ORDER — POTASSIUM CHLORIDE 7.45 MG/ML
10 INJECTION INTRAVENOUS ONCE
Status: COMPLETED | OUTPATIENT
Start: 2018-01-01 | End: 2018-01-01

## 2018-01-01 RX ORDER — PANTOPRAZOLE SODIUM 40 MG/1
40 TABLET, DELAYED RELEASE ORAL
Qty: 30 TABLET | Refills: 3 | Status: SHIPPED | OUTPATIENT
Start: 2018-01-01 | End: 2018-01-01 | Stop reason: ALTCHOICE

## 2018-01-01 RX ORDER — DEXAMETHASONE SODIUM PHOSPHATE 10 MG/ML
10 INJECTION INTRAMUSCULAR; INTRAVENOUS 2 TIMES DAILY
Status: DISCONTINUED | OUTPATIENT
Start: 2018-01-01 | End: 2018-01-01

## 2018-01-01 RX ORDER — OXYCODONE HYDROCHLORIDE AND ACETAMINOPHEN 5; 325 MG/1; MG/1
1 TABLET ORAL EVERY 4 HOURS PRN
Status: DISCONTINUED | OUTPATIENT
Start: 2018-01-01 | End: 2018-01-01 | Stop reason: HOSPADM

## 2018-01-01 RX ORDER — OYSTER SHELL CALCIUM WITH VITAMIN D 500; 200 MG/1; [IU]/1
1 TABLET, FILM COATED ORAL 2 TIMES DAILY
Status: DISCONTINUED | OUTPATIENT
Start: 2018-01-01 | End: 2018-01-01

## 2018-01-01 RX ORDER — GLYCOPYRROLATE 0.2 MG/ML
0.2 INJECTION INTRAMUSCULAR; INTRAVENOUS
Status: DISCONTINUED | OUTPATIENT
Start: 2018-01-01 | End: 2018-01-01 | Stop reason: HOSPADM

## 2018-01-01 RX ORDER — BISACODYL 10 MG
10 SUPPOSITORY, RECTAL RECTAL DAILY
Status: DISCONTINUED | OUTPATIENT
Start: 2018-01-01 | End: 2018-01-01 | Stop reason: HOSPADM

## 2018-01-01 RX ORDER — SENNA PLUS 8.6 MG/1
1 TABLET ORAL NIGHTLY
Status: DISCONTINUED | OUTPATIENT
Start: 2018-01-01 | End: 2018-01-01 | Stop reason: HOSPADM

## 2018-01-01 RX ORDER — CEFDINIR 300 MG/1
300 CAPSULE ORAL 2 TIMES DAILY
Qty: 20 CAPSULE | Refills: 0 | Status: SHIPPED | OUTPATIENT
Start: 2018-01-01 | End: 2018-01-01

## 2018-01-01 RX ORDER — ONDANSETRON 2 MG/ML
4 INJECTION INTRAMUSCULAR; INTRAVENOUS EVERY 6 HOURS PRN
Status: DISCONTINUED | OUTPATIENT
Start: 2018-01-01 | End: 2018-01-01 | Stop reason: HOSPADM

## 2018-01-01 RX ORDER — POLYETHYLENE GLYCOL 3350 17 G/17G
17 POWDER, FOR SOLUTION ORAL DAILY
Status: DISCONTINUED | OUTPATIENT
Start: 2018-01-01 | End: 2018-01-01 | Stop reason: HOSPADM

## 2018-01-01 RX ORDER — GABAPENTIN 300 MG/1
300 CAPSULE ORAL
Status: DISCONTINUED | OUTPATIENT
Start: 2018-01-01 | End: 2018-01-01

## 2018-01-01 RX ORDER — SENNA AND DOCUSATE SODIUM 50; 8.6 MG/1; MG/1
2 TABLET, FILM COATED ORAL DAILY PRN
Status: DISCONTINUED | OUTPATIENT
Start: 2018-01-01 | End: 2018-01-01

## 2018-01-01 RX ORDER — OXYCODONE HYDROCHLORIDE AND ACETAMINOPHEN 5; 325 MG/1; MG/1
1 TABLET ORAL EVERY 4 HOURS PRN
Status: CANCELLED | OUTPATIENT
Start: 2018-01-01

## 2018-01-01 RX ORDER — MORPHINE SULFATE 20 MG/ML
10 SOLUTION ORAL EVERY 4 HOURS PRN
Status: DISCONTINUED | OUTPATIENT
Start: 2018-01-01 | End: 2018-01-01

## 2018-01-01 RX ORDER — PANTOPRAZOLE SODIUM 40 MG/1
40 TABLET, DELAYED RELEASE ORAL
Status: CANCELLED | OUTPATIENT
Start: 2018-01-01

## 2018-01-01 RX ORDER — METOCLOPRAMIDE 5 MG/1
5 TABLET ORAL 2 TIMES DAILY
Status: DISCONTINUED | OUTPATIENT
Start: 2018-01-01 | End: 2018-01-01

## 2018-01-01 RX ORDER — LEVOFLOXACIN 250 MG/1
250 TABLET ORAL DAILY
Status: DISCONTINUED | OUTPATIENT
Start: 2018-01-01 | End: 2018-01-01

## 2018-01-01 RX ORDER — SODIUM CHLORIDE 9 MG/ML
INJECTION, SOLUTION INTRAVENOUS CONTINUOUS
Status: DISCONTINUED | OUTPATIENT
Start: 2018-01-01 | End: 2018-01-01

## 2018-01-01 RX ORDER — DIPHENHYDRAMINE HYDROCHLORIDE 50 MG/ML
12.5 INJECTION INTRAMUSCULAR; INTRAVENOUS
Status: DISCONTINUED | OUTPATIENT
Start: 2018-01-01 | End: 2018-01-01

## 2018-01-01 RX ORDER — SENNA PLUS 8.6 MG/1
1 TABLET ORAL NIGHTLY
Status: CANCELLED | OUTPATIENT
Start: 2018-01-01

## 2018-01-01 RX ORDER — ALBUMIN (HUMAN) 12.5 G/50ML
25 SOLUTION INTRAVENOUS EVERY 6 HOURS
Status: DISCONTINUED | OUTPATIENT
Start: 2018-01-01 | End: 2018-01-01

## 2018-01-01 RX ORDER — ONDANSETRON 2 MG/ML
4 INJECTION INTRAMUSCULAR; INTRAVENOUS EVERY 6 HOURS PRN
Status: CANCELLED | OUTPATIENT
Start: 2018-01-01

## 2018-01-01 RX ORDER — ALBUMIN (HUMAN) 12.5 G/50ML
25 SOLUTION INTRAVENOUS ONCE
Status: COMPLETED | OUTPATIENT
Start: 2018-01-01 | End: 2018-01-01

## 2018-01-01 RX ORDER — IPRATROPIUM BROMIDE AND ALBUTEROL SULFATE 2.5; .5 MG/3ML; MG/3ML
1 SOLUTION RESPIRATORY (INHALATION)
Status: DISCONTINUED | OUTPATIENT
Start: 2018-01-01 | End: 2018-01-01 | Stop reason: HOSPADM

## 2018-01-01 RX ORDER — FENTANYL 50 UG/H
1 PATCH TRANSDERMAL
COMMUNITY

## 2018-01-01 RX ORDER — GABAPENTIN 300 MG/1
300 CAPSULE ORAL 2 TIMES DAILY
Status: DISCONTINUED | OUTPATIENT
Start: 2018-01-01 | End: 2018-01-01

## 2018-01-01 RX ORDER — LACTULOSE 10 G/15ML
20 SOLUTION ORAL 3 TIMES DAILY
Status: DISCONTINUED | OUTPATIENT
Start: 2018-01-01 | End: 2018-01-01

## 2018-01-01 RX ORDER — FUROSEMIDE 10 MG/ML
INJECTION INTRAMUSCULAR; INTRAVENOUS
Status: COMPLETED
Start: 2018-01-01 | End: 2018-01-01

## 2018-01-01 RX ORDER — MORPHINE SULFATE 2 MG/ML
2 INJECTION, SOLUTION INTRAMUSCULAR; INTRAVENOUS
Status: DISCONTINUED | OUTPATIENT
Start: 2018-01-01 | End: 2018-01-01

## 2018-01-01 RX ORDER — FENTANYL CITRATE 50 UG/ML
INJECTION, SOLUTION INTRAMUSCULAR; INTRAVENOUS PRN
Status: DISCONTINUED | OUTPATIENT
Start: 2018-01-01 | End: 2018-01-01 | Stop reason: SDUPTHER

## 2018-01-01 RX ORDER — LORAZEPAM 2 MG/ML
0.5 INJECTION INTRAMUSCULAR ONCE
Status: COMPLETED | OUTPATIENT
Start: 2018-01-01 | End: 2018-01-01

## 2018-01-01 RX ORDER — NEOSTIGMINE METHYLSULFATE 1 MG/ML
INJECTION, SOLUTION INTRAVENOUS PRN
Status: DISCONTINUED | OUTPATIENT
Start: 2018-01-01 | End: 2018-01-01 | Stop reason: SDUPTHER

## 2018-01-01 RX ORDER — BISACODYL 10 MG
10 SUPPOSITORY, RECTAL RECTAL DAILY
Status: CANCELLED | OUTPATIENT
Start: 2018-01-01

## 2018-01-01 RX ORDER — LABETALOL HYDROCHLORIDE 5 MG/ML
5 INJECTION, SOLUTION INTRAVENOUS EVERY 10 MIN PRN
Status: DISCONTINUED | OUTPATIENT
Start: 2018-01-01 | End: 2018-01-01

## 2018-01-01 RX ORDER — OXYCODONE HYDROCHLORIDE AND ACETAMINOPHEN 5; 325 MG/1; MG/1
1 TABLET ORAL EVERY 4 HOURS PRN
Qty: 120 TABLET | Refills: 0 | Status: SHIPPED | OUTPATIENT
Start: 2018-01-01 | End: 2018-01-01 | Stop reason: ALTCHOICE

## 2018-01-01 RX ORDER — B-COMPLEX WITH VITAMIN C
1 TABLET ORAL 2 TIMES DAILY
Status: ON HOLD | COMMUNITY
End: 2018-01-01 | Stop reason: HOSPADM

## 2018-01-01 RX ORDER — NALOXONE HYDROCHLORIDE 0.4 MG/ML
0.4 INJECTION, SOLUTION INTRAMUSCULAR; INTRAVENOUS; SUBCUTANEOUS PRN
Status: DISCONTINUED | OUTPATIENT
Start: 2018-01-01 | End: 2018-01-01

## 2018-01-01 RX ORDER — FUROSEMIDE 10 MG/ML
20 INJECTION INTRAMUSCULAR; INTRAVENOUS 4 TIMES DAILY
Status: DISCONTINUED | OUTPATIENT
Start: 2018-01-01 | End: 2018-01-01

## 2018-01-01 RX ORDER — CHOLECALCIFEROL (VITAMIN D3) 50 MCG
2000 TABLET ORAL DAILY
Status: DISCONTINUED | OUTPATIENT
Start: 2018-01-01 | End: 2018-01-01 | Stop reason: HOSPADM

## 2018-01-01 RX ORDER — METHYLPREDNISOLONE 4 MG/1
4 TABLET ORAL NIGHTLY
Status: COMPLETED | OUTPATIENT
Start: 2018-01-01 | End: 2018-01-01

## 2018-01-01 RX ORDER — LACTULOSE 10 G/15ML
10 SOLUTION ORAL DAILY
Status: DISCONTINUED | OUTPATIENT
Start: 2018-01-01 | End: 2018-01-01 | Stop reason: HOSPADM

## 2018-01-01 RX ORDER — HYDROMORPHONE HCL 110MG/55ML
1 PATIENT CONTROLLED ANALGESIA SYRINGE INTRAVENOUS
Status: DISCONTINUED | OUTPATIENT
Start: 2018-01-01 | End: 2018-01-01 | Stop reason: HOSPADM

## 2018-01-01 RX ORDER — 0.9 % SODIUM CHLORIDE 0.9 %
1000 INTRAVENOUS SOLUTION INTRAVENOUS ONCE
Status: COMPLETED | OUTPATIENT
Start: 2018-01-01 | End: 2018-01-01

## 2018-01-01 RX ORDER — GLYCOPYRROLATE 0.2 MG/ML
INJECTION INTRAMUSCULAR; INTRAVENOUS PRN
Status: DISCONTINUED | OUTPATIENT
Start: 2018-01-01 | End: 2018-01-01 | Stop reason: SDUPTHER

## 2018-01-01 RX ORDER — MIDAZOLAM HYDROCHLORIDE 1 MG/ML
INJECTION INTRAMUSCULAR; INTRAVENOUS PRN
Status: DISCONTINUED | OUTPATIENT
Start: 2018-01-01 | End: 2018-01-01 | Stop reason: SDUPTHER

## 2018-01-01 RX ORDER — METHYLPREDNISOLONE 8 MG/1
8 TABLET ORAL NIGHTLY
Qty: 6 TABLET | Refills: 0 | Status: ON HOLD | DISCHARGE
Start: 2018-01-01 | End: 2018-01-01 | Stop reason: HOSPADM

## 2018-01-01 RX ORDER — DEXAMETHASONE SODIUM PHOSPHATE 4 MG/ML
4 INJECTION, SOLUTION INTRA-ARTICULAR; INTRALESIONAL; INTRAMUSCULAR; INTRAVENOUS; SOFT TISSUE EVERY 6 HOURS
Status: DISCONTINUED | OUTPATIENT
Start: 2018-01-01 | End: 2018-01-01

## 2018-01-01 RX ORDER — ROCURONIUM BROMIDE 10 MG/ML
INJECTION, SOLUTION INTRAVENOUS PRN
Status: DISCONTINUED | OUTPATIENT
Start: 2018-01-01 | End: 2018-01-01 | Stop reason: SDUPTHER

## 2018-01-01 RX ORDER — METHYLPREDNISOLONE 4 MG/1
24 TABLET ORAL ONCE
Status: COMPLETED | OUTPATIENT
Start: 2018-01-01 | End: 2018-01-01

## 2018-01-01 RX ORDER — M-VIT,TX,IRON,MINS/CALC/FOLIC 27MG-0.4MG
1 TABLET ORAL DAILY
COMMUNITY
End: 2018-01-01 | Stop reason: ALTCHOICE

## 2018-01-01 RX ORDER — MEPERIDINE HYDROCHLORIDE 50 MG/ML
12.5 INJECTION INTRAMUSCULAR; INTRAVENOUS; SUBCUTANEOUS EVERY 5 MIN PRN
Status: DISCONTINUED | OUTPATIENT
Start: 2018-01-01 | End: 2018-01-01

## 2018-01-01 RX ORDER — LACTULOSE 10 G/15ML
20 SOLUTION ORAL 3 TIMES DAILY
Status: DISCONTINUED | OUTPATIENT
Start: 2018-01-01 | End: 2018-01-01 | Stop reason: HOSPADM

## 2018-01-01 RX ORDER — LABETALOL HYDROCHLORIDE 5 MG/ML
INJECTION, SOLUTION INTRAVENOUS PRN
Status: DISCONTINUED | OUTPATIENT
Start: 2018-01-01 | End: 2018-01-01 | Stop reason: SDUPTHER

## 2018-01-01 RX ORDER — GABAPENTIN 300 MG/1
300 CAPSULE ORAL
COMMUNITY

## 2018-01-01 RX ORDER — FUROSEMIDE 10 MG/ML
80 INJECTION INTRAMUSCULAR; INTRAVENOUS ONCE
Status: COMPLETED | OUTPATIENT
Start: 2018-01-01 | End: 2018-01-01

## 2018-01-01 RX ORDER — SENNA PLUS 8.6 MG/1
2 TABLET ORAL 2 TIMES DAILY
Status: DISCONTINUED | OUTPATIENT
Start: 2018-01-01 | End: 2018-01-01

## 2018-01-01 RX ORDER — SENNA AND DOCUSATE SODIUM 50; 8.6 MG/1; MG/1
2 TABLET, FILM COATED ORAL DAILY PRN
Status: CANCELLED | OUTPATIENT
Start: 2018-01-01

## 2018-01-01 RX ORDER — LACTULOSE 10 G/15ML
10 SOLUTION ORAL DAILY PRN
Status: DISCONTINUED | OUTPATIENT
Start: 2018-01-01 | End: 2018-01-01 | Stop reason: HOSPADM

## 2018-01-01 RX ORDER — LACTULOSE 10 G/15ML
20 SOLUTION ORAL 3 TIMES DAILY
Status: CANCELLED | OUTPATIENT
Start: 2018-01-01

## 2018-01-01 RX ORDER — METOCLOPRAMIDE 5 MG/1
5 TABLET ORAL
Status: DISCONTINUED | OUTPATIENT
Start: 2018-01-01 | End: 2018-01-01

## 2018-01-01 RX ORDER — ALBUMIN (HUMAN) 12.5 G/50ML
SOLUTION INTRAVENOUS
Status: COMPLETED
Start: 2018-01-01 | End: 2018-01-01

## 2018-01-01 RX ORDER — LANOLIN ALCOHOL/MO/W.PET/CERES
400 CREAM (GRAM) TOPICAL ONCE
Status: COMPLETED | OUTPATIENT
Start: 2018-01-01 | End: 2018-01-01

## 2018-01-01 RX ORDER — FLUTICASONE PROPIONATE 50 MCG
1 SPRAY, SUSPENSION (ML) NASAL DAILY
Status: DISCONTINUED | OUTPATIENT
Start: 2018-01-01 | End: 2018-01-01 | Stop reason: HOSPADM

## 2018-01-01 RX ORDER — SENNA AND DOCUSATE SODIUM 50; 8.6 MG/1; MG/1
1 TABLET, FILM COATED ORAL 2 TIMES DAILY
Status: DISCONTINUED | OUTPATIENT
Start: 2018-01-01 | End: 2018-01-01 | Stop reason: HOSPADM

## 2018-01-01 RX ORDER — LACTULOSE 10 G/15ML
20 SOLUTION ORAL 3 TIMES DAILY
Refills: 1 | DISCHARGE
Start: 2018-01-01 | End: 2018-01-01 | Stop reason: ALTCHOICE

## 2018-01-01 RX ORDER — ACETAMINOPHEN 325 MG/1
650 TABLET ORAL 3 TIMES DAILY PRN
Status: DISCONTINUED | OUTPATIENT
Start: 2018-01-01 | End: 2018-01-01 | Stop reason: HOSPADM

## 2018-01-01 RX ORDER — VITAMIN C
1 TAB ORAL DAILY
Status: DISCONTINUED | OUTPATIENT
Start: 2018-01-01 | End: 2018-01-01 | Stop reason: HOSPADM

## 2018-01-01 RX ORDER — GABAPENTIN 100 MG/1
100 CAPSULE ORAL 2 TIMES DAILY
Status: DISCONTINUED | OUTPATIENT
Start: 2018-01-01 | End: 2018-01-01

## 2018-01-01 RX ORDER — FENTANYL 50 UG/H
1 PATCH TRANSDERMAL
Status: DISCONTINUED | OUTPATIENT
Start: 2018-01-01 | End: 2018-01-01

## 2018-01-01 RX ORDER — LORAZEPAM 0.5 MG/1
0.25 TABLET ORAL 2 TIMES DAILY
Status: DISCONTINUED | OUTPATIENT
Start: 2018-01-01 | End: 2018-01-01

## 2018-01-01 RX ORDER — HYDROMORPHONE HCL 110MG/55ML
0.5 PATIENT CONTROLLED ANALGESIA SYRINGE INTRAVENOUS
Status: DISCONTINUED | OUTPATIENT
Start: 2018-01-01 | End: 2018-01-01 | Stop reason: HOSPADM

## 2018-01-01 RX ORDER — SENNA PLUS 8.6 MG/1
1 TABLET ORAL NIGHTLY
Qty: 30 TABLET | Refills: 0 | Status: ON HOLD | DISCHARGE
Start: 2018-01-01 | End: 2018-01-01 | Stop reason: HOSPADM

## 2018-01-01 RX ORDER — MORPHINE SULFATE 2 MG/ML
2 INJECTION, SOLUTION INTRAMUSCULAR; INTRAVENOUS
Status: DISCONTINUED | OUTPATIENT
Start: 2018-01-01 | End: 2018-01-01 | Stop reason: CLARIF

## 2018-01-01 RX ORDER — LOSARTAN POTASSIUM 50 MG/1
100 TABLET ORAL DAILY
Status: DISCONTINUED | OUTPATIENT
Start: 2018-01-01 | End: 2018-01-01

## 2018-01-01 RX ORDER — CHOLECALCIFEROL (VITAMIN D3) 50 MCG
2000 TABLET ORAL DAILY
Status: CANCELLED | OUTPATIENT
Start: 2018-01-01

## 2018-01-01 RX ORDER — IPRATROPIUM BROMIDE AND ALBUTEROL SULFATE 2.5; .5 MG/3ML; MG/3ML
1 SOLUTION RESPIRATORY (INHALATION)
Status: DISCONTINUED | OUTPATIENT
Start: 2018-01-01 | End: 2018-01-01

## 2018-01-01 RX ORDER — BISACODYL 10 MG
10 SUPPOSITORY, RECTAL RECTAL DAILY PRN
Status: DISCONTINUED | OUTPATIENT
Start: 2018-01-01 | End: 2018-01-01

## 2018-01-01 RX ORDER — METOCLOPRAMIDE HYDROCHLORIDE 5 MG/ML
5 INJECTION INTRAMUSCULAR; INTRAVENOUS EVERY 12 HOURS
Status: ON HOLD | DISCHARGE
Start: 2018-01-01 | End: 2018-01-01 | Stop reason: HOSPADM

## 2018-01-01 RX ORDER — IPRATROPIUM BROMIDE AND ALBUTEROL SULFATE 2.5; .5 MG/3ML; MG/3ML
1 SOLUTION RESPIRATORY (INHALATION) 4 TIMES DAILY
Status: DISCONTINUED | OUTPATIENT
Start: 2018-01-01 | End: 2018-01-01 | Stop reason: HOSPADM

## 2018-01-01 RX ORDER — BISACODYL 10 MG
10 SUPPOSITORY, RECTAL RECTAL DAILY
Qty: 30 SUPPOSITORY | Refills: 0 | Status: ON HOLD | DISCHARGE
Start: 2018-01-01 | End: 2018-01-01 | Stop reason: HOSPADM

## 2018-01-01 RX ORDER — HEPARIN SODIUM (PORCINE) LOCK FLUSH IV SOLN 100 UNIT/ML 100 UNIT/ML
100 SOLUTION INTRAVENOUS EVERY 12 HOURS
Status: DISCONTINUED | OUTPATIENT
Start: 2018-01-01 | End: 2018-01-01 | Stop reason: HOSPADM

## 2018-01-01 RX ORDER — METHYLPREDNISOLONE SODIUM SUCCINATE 125 MG/2ML
125 INJECTION, POWDER, LYOPHILIZED, FOR SOLUTION INTRAMUSCULAR; INTRAVENOUS ONCE
Status: COMPLETED | OUTPATIENT
Start: 2018-01-01 | End: 2018-01-01

## 2018-01-01 RX ORDER — ONDANSETRON 2 MG/ML
INJECTION INTRAMUSCULAR; INTRAVENOUS PRN
Status: DISCONTINUED | OUTPATIENT
Start: 2018-01-01 | End: 2018-01-01 | Stop reason: SDUPTHER

## 2018-01-01 RX ORDER — SODIUM PHOSPHATE, DIBASIC AND SODIUM PHOSPHATE, MONOBASIC 7; 19 G/133ML; G/133ML
1 ENEMA RECTAL
Status: ACTIVE | OUTPATIENT
Start: 2018-01-01 | End: 2018-01-01

## 2018-01-01 RX ORDER — MORPHINE SULFATE 1 MG/ML
INJECTION, SOLUTION EPIDURAL; INTRATHECAL; INTRAVENOUS PRN
Status: DISCONTINUED | OUTPATIENT
Start: 2018-01-01 | End: 2018-01-01 | Stop reason: HOSPADM

## 2018-01-01 RX ORDER — LIDOCAINE HYDROCHLORIDE AND EPINEPHRINE 10; 10 MG/ML; UG/ML
INJECTION, SOLUTION INFILTRATION; PERINEURAL PRN
Status: DISCONTINUED | OUTPATIENT
Start: 2018-01-01 | End: 2018-01-01 | Stop reason: HOSPADM

## 2018-01-01 RX ORDER — HYDROMORPHONE HCL 110MG/55ML
2 PATIENT CONTROLLED ANALGESIA SYRINGE INTRAVENOUS
Status: DISCONTINUED | OUTPATIENT
Start: 2018-01-01 | End: 2018-01-01 | Stop reason: HOSPADM

## 2018-01-01 RX ORDER — SENNA AND DOCUSATE SODIUM 50; 8.6 MG/1; MG/1
2 TABLET, FILM COATED ORAL DAILY PRN
Qty: 60 TABLET | Refills: 0 | Status: SHIPPED | OUTPATIENT
Start: 2018-01-01

## 2018-01-01 RX ORDER — ALBUTEROL SULFATE 2.5 MG/3ML
2.5 SOLUTION RESPIRATORY (INHALATION)
Status: DISCONTINUED | OUTPATIENT
Start: 2018-01-01 | End: 2018-01-01 | Stop reason: HOSPADM

## 2018-01-01 RX ORDER — SENNA PLUS 8.6 MG/1
1 TABLET ORAL NIGHTLY
Status: DISCONTINUED | OUTPATIENT
Start: 2018-01-01 | End: 2018-01-01

## 2018-01-01 RX ORDER — AMLODIPINE AND OLMESARTAN MEDOXOMIL 5; 40 MG/1; MG/1
1 TABLET ORAL DAILY
Status: DISCONTINUED | OUTPATIENT
Start: 2018-01-01 | End: 2018-01-01 | Stop reason: CLARIF

## 2018-01-01 RX ORDER — LACTULOSE 10 G/15ML
10 SOLUTION ORAL DAILY
Qty: 1 BOTTLE | Refills: 1 | Status: SHIPPED | OUTPATIENT
Start: 2018-01-01

## 2018-01-01 RX ORDER — DEXAMETHASONE SODIUM PHOSPHATE 4 MG/ML
4 INJECTION, SOLUTION INTRA-ARTICULAR; INTRALESIONAL; INTRAMUSCULAR; INTRAVENOUS; SOFT TISSUE DAILY
Status: DISCONTINUED | OUTPATIENT
Start: 2018-01-01 | End: 2018-01-01

## 2018-01-01 RX ORDER — METHYLPREDNISOLONE SODIUM SUCCINATE 125 MG/2ML
60 INJECTION, POWDER, LYOPHILIZED, FOR SOLUTION INTRAMUSCULAR; INTRAVENOUS EVERY 8 HOURS
Status: DISCONTINUED | OUTPATIENT
Start: 2018-01-01 | End: 2018-01-01 | Stop reason: HOSPADM

## 2018-01-01 RX ORDER — SODIUM CHLORIDE 9 MG/ML
INJECTION, SOLUTION INTRAVENOUS ONCE
Status: COMPLETED | OUTPATIENT
Start: 2018-01-01 | End: 2018-01-01

## 2018-01-01 RX ORDER — OXYCODONE HYDROCHLORIDE AND ACETAMINOPHEN 5; 325 MG/1; MG/1
1 TABLET ORAL EVERY 6 HOURS PRN
Status: DISCONTINUED | OUTPATIENT
Start: 2018-01-01 | End: 2018-01-01

## 2018-01-01 RX ORDER — METOCLOPRAMIDE HYDROCHLORIDE 5 MG/ML
5 INJECTION INTRAMUSCULAR; INTRAVENOUS EVERY 12 HOURS
Status: CANCELLED | OUTPATIENT
Start: 2018-01-01

## 2018-01-01 RX ORDER — LEVOFLOXACIN 250 MG/1
250 TABLET ORAL DAILY
Qty: 10 TABLET | Refills: 0 | DISCHARGE
Start: 2018-01-01 | End: 2018-01-01 | Stop reason: HOSPADM

## 2018-01-01 RX ORDER — SODIUM CHLORIDE 0.9 % (FLUSH) 0.9 %
10 SYRINGE (ML) INJECTION EVERY 12 HOURS SCHEDULED
Status: DISCONTINUED | OUTPATIENT
Start: 2018-01-01 | End: 2018-01-01 | Stop reason: HOSPADM

## 2018-01-01 RX ORDER — NALOXONE HYDROCHLORIDE 0.4 MG/ML
INJECTION, SOLUTION INTRAMUSCULAR; INTRAVENOUS; SUBCUTANEOUS
Status: COMPLETED
Start: 2018-01-01 | End: 2018-01-01

## 2018-01-01 RX ORDER — LIDOCAINE HYDROCHLORIDE 20 MG/ML
INJECTION, SOLUTION INFILTRATION; PERINEURAL PRN
Status: DISCONTINUED | OUTPATIENT
Start: 2018-01-01 | End: 2018-01-01 | Stop reason: SDUPTHER

## 2018-01-01 RX ORDER — METHYLPREDNISOLONE 4 MG/1
4 TABLET ORAL NIGHTLY
Qty: 6 TABLET | Refills: 0 | Status: ON HOLD | DISCHARGE
Start: 2018-01-01 | End: 2018-01-01 | Stop reason: HOSPADM

## 2018-01-01 RX ORDER — 0.9 % SODIUM CHLORIDE 0.9 %
750 INTRAVENOUS SOLUTION INTRAVENOUS ONCE
Status: COMPLETED | OUTPATIENT
Start: 2018-01-01 | End: 2018-01-01

## 2018-01-01 RX ORDER — METOCLOPRAMIDE HYDROCHLORIDE 5 MG/ML
5 INJECTION INTRAMUSCULAR; INTRAVENOUS EVERY 12 HOURS
Status: DISCONTINUED | OUTPATIENT
Start: 2018-01-01 | End: 2018-01-01 | Stop reason: ALTCHOICE

## 2018-01-01 RX ORDER — FENTANYL CITRATE 50 UG/ML
25 INJECTION, SOLUTION INTRAMUSCULAR; INTRAVENOUS EVERY 5 MIN PRN
Status: DISCONTINUED | OUTPATIENT
Start: 2018-01-01 | End: 2018-01-01

## 2018-01-01 RX ORDER — POLYETHYLENE GLYCOL 3350 17 G/17G
17 POWDER, FOR SOLUTION ORAL DAILY
Status: CANCELLED | OUTPATIENT
Start: 2018-01-01

## 2018-01-01 RX ORDER — FENTANYL 75 UG/H
1 PATCH TRANSDERMAL
Status: DISCONTINUED | OUTPATIENT
Start: 2018-01-01 | End: 2018-01-01 | Stop reason: HOSPADM

## 2018-01-01 RX ORDER — DEXAMETHASONE SODIUM PHOSPHATE 10 MG/ML
INJECTION, SOLUTION INTRAMUSCULAR; INTRAVENOUS PRN
Status: DISCONTINUED | OUTPATIENT
Start: 2018-01-01 | End: 2018-01-01 | Stop reason: SDUPTHER

## 2018-01-01 RX ORDER — IBUPROFEN 200 MG
400 TABLET ORAL EVERY 4 HOURS PRN
Status: ON HOLD | COMMUNITY
End: 2018-01-01 | Stop reason: HOSPADM

## 2018-01-01 RX ORDER — 0.9 % SODIUM CHLORIDE 0.9 %
1500 INTRAVENOUS SOLUTION INTRAVENOUS ONCE
Status: DISCONTINUED | OUTPATIENT
Start: 2018-01-01 | End: 2018-01-01

## 2018-01-01 RX ORDER — HALOPERIDOL 5 MG/ML
2 INJECTION INTRAMUSCULAR EVERY 6 HOURS PRN
Status: DISCONTINUED | OUTPATIENT
Start: 2018-01-01 | End: 2018-01-01 | Stop reason: HOSPADM

## 2018-01-01 RX ORDER — M-VIT,TX,IRON,MINS/CALC/FOLIC 27MG-0.4MG
1 TABLET ORAL DAILY
Status: CANCELLED | OUTPATIENT
Start: 2018-01-01

## 2018-01-01 RX ORDER — BISACODYL 10 MG
10 SUPPOSITORY, RECTAL RECTAL DAILY
Status: DISCONTINUED | OUTPATIENT
Start: 2018-01-01 | End: 2018-01-01

## 2018-01-01 RX ORDER — DEXTROSE, SODIUM CHLORIDE, AND POTASSIUM CHLORIDE 5; .45; .3 G/100ML; G/100ML; G/100ML
INJECTION INTRAVENOUS CONTINUOUS
Status: DISCONTINUED | OUTPATIENT
Start: 2018-01-01 | End: 2018-01-01

## 2018-01-01 RX ORDER — OXYCODONE AND ACETAMINOPHEN 10; 325 MG/1; MG/1
1 TABLET ORAL EVERY 4 HOURS
Status: DISCONTINUED | OUTPATIENT
Start: 2018-01-01 | End: 2018-01-01

## 2018-01-01 RX ORDER — VITAMIN C
1 TAB ORAL DAILY
Status: CANCELLED | OUTPATIENT
Start: 2018-01-01

## 2018-01-01 RX ADMIN — TAZOBACTAM SODIUM AND PIPERACILLIN SODIUM 3.38 G: 375; 3 INJECTION, SOLUTION INTRAVENOUS at 23:46

## 2018-01-01 RX ADMIN — Medication 2000 UNITS: at 09:38

## 2018-01-01 RX ADMIN — IPRATROPIUM BROMIDE AND ALBUTEROL SULFATE 1 AMPULE: 2.5; .5 SOLUTION RESPIRATORY (INHALATION) at 12:51

## 2018-01-01 RX ADMIN — OXYCODONE HYDROCHLORIDE AND ACETAMINOPHEN 1 TABLET: 5; 325 TABLET ORAL at 09:19

## 2018-01-01 RX ADMIN — MULTIPLE VITAMINS W/ MINERALS TAB 1 TABLET: TAB at 09:48

## 2018-01-01 RX ADMIN — MORPHINE SULFATE 10 MG: 100 SOLUTION ORAL at 23:47

## 2018-01-01 RX ADMIN — IPRATROPIUM BROMIDE AND ALBUTEROL SULFATE 1 AMPULE: 2.5; .5 SOLUTION RESPIRATORY (INHALATION) at 08:07

## 2018-01-01 RX ADMIN — Medication 2000 UNITS: at 08:33

## 2018-01-01 RX ADMIN — DOCUSATE SODIUM AND SENNOSIDES 2 TABLET: 8.6; 5 TABLET, FILM COATED ORAL at 06:37

## 2018-01-01 RX ADMIN — SODIUM CHLORIDE 1000 ML: 9 INJECTION, SOLUTION INTRAVENOUS at 10:27

## 2018-01-01 RX ADMIN — Medication 2000 UNITS: at 08:42

## 2018-01-01 RX ADMIN — OXYCODONE HYDROCHLORIDE AND ACETAMINOPHEN 1 TABLET: 5; 325 TABLET ORAL at 03:11

## 2018-01-01 RX ADMIN — VITAMIN C 1 TABLET: TAB at 09:17

## 2018-01-01 RX ADMIN — Medication 2000 UNITS: at 09:14

## 2018-01-01 RX ADMIN — OXYCODONE HYDROCHLORIDE AND ACETAMINOPHEN 1 TABLET: 5; 325 TABLET ORAL at 06:52

## 2018-01-01 RX ADMIN — MORPHINE SULFATE 10 MG: 100 SOLUTION ORAL at 12:59

## 2018-01-01 RX ADMIN — METHYLPREDNISOLONE 8 MG: 4 TABLET ORAL at 21:26

## 2018-01-01 RX ADMIN — METOCLOPRAMIDE 5 MG: 5 TABLET ORAL at 11:12

## 2018-01-01 RX ADMIN — OXYCODONE HYDROCHLORIDE AND ACETAMINOPHEN 1 TABLET: 5; 325 TABLET ORAL at 12:03

## 2018-01-01 RX ADMIN — HYDROMORPHONE HYDROCHLORIDE 0.5 MG: 2 INJECTION INTRAMUSCULAR; INTRAVENOUS; SUBCUTANEOUS at 03:16

## 2018-01-01 RX ADMIN — IPRATROPIUM BROMIDE AND ALBUTEROL SULFATE 1 AMPULE: 2.5; .5 SOLUTION RESPIRATORY (INHALATION) at 21:25

## 2018-01-01 RX ADMIN — Medication 2000 UNITS: at 08:35

## 2018-01-01 RX ADMIN — LACTULOSE 20 G: 20 SOLUTION ORAL at 15:46

## 2018-01-01 RX ADMIN — OXYCODONE HYDROCHLORIDE AND ACETAMINOPHEN 1 TABLET: 5; 325 TABLET ORAL at 13:12

## 2018-01-01 RX ADMIN — Medication 10 ML: at 23:49

## 2018-01-01 RX ADMIN — OXYCODONE HYDROCHLORIDE AND ACETAMINOPHEN 1 TABLET: 5; 325 TABLET ORAL at 16:42

## 2018-01-01 RX ADMIN — MULTIPLE VITAMINS W/ MINERALS TAB 1 TABLET: TAB at 09:26

## 2018-01-01 RX ADMIN — POLYETHYLENE GLYCOL 3350 17 G: 17 POWDER, FOR SOLUTION ORAL at 09:42

## 2018-01-01 RX ADMIN — BISACODYL 10 MG: 10 SUPPOSITORY RECTAL at 01:21

## 2018-01-01 RX ADMIN — OXYCODONE HYDROCHLORIDE AND ACETAMINOPHEN 1 TABLET: 5; 325 TABLET ORAL at 16:30

## 2018-01-01 RX ADMIN — ENOXAPARIN SODIUM 40 MG: 40 INJECTION SUBCUTANEOUS at 09:15

## 2018-01-01 RX ADMIN — DEXAMETHASONE SODIUM PHOSPHATE 4 MG: 4 INJECTION, SOLUTION INTRAMUSCULAR; INTRAVENOUS at 01:56

## 2018-01-01 RX ADMIN — SENNOSIDES AND DOCUSATE SODIUM 2 TABLET: 8.6; 5 TABLET ORAL at 00:04

## 2018-01-01 RX ADMIN — OXYCODONE HYDROCHLORIDE AND ACETAMINOPHEN 1 TABLET: 5; 325 TABLET ORAL at 08:51

## 2018-01-01 RX ADMIN — HYDROMORPHONE HYDROCHLORIDE 0.5 MG: 2 INJECTION INTRAMUSCULAR; INTRAVENOUS; SUBCUTANEOUS at 00:05

## 2018-01-01 RX ADMIN — Medication 2000 UNITS: at 08:12

## 2018-01-01 RX ADMIN — IPRATROPIUM BROMIDE AND ALBUTEROL SULFATE 1 AMPULE: 2.5; .5 SOLUTION RESPIRATORY (INHALATION) at 06:30

## 2018-01-01 RX ADMIN — HYDROMORPHONE HYDROCHLORIDE 0.5 MG: 2 INJECTION INTRAMUSCULAR; INTRAVENOUS; SUBCUTANEOUS at 15:48

## 2018-01-01 RX ADMIN — MIDAZOLAM HYDROCHLORIDE 2 MG: 1 INJECTION, SOLUTION INTRAMUSCULAR; INTRAVENOUS at 19:22

## 2018-01-01 RX ADMIN — GABAPENTIN 600 MG: 300 CAPSULE ORAL at 01:13

## 2018-01-01 RX ADMIN — FENTANYL CITRATE 25 MCG: 50 INJECTION, SOLUTION INTRAMUSCULAR; INTRAVENOUS at 21:29

## 2018-01-01 RX ADMIN — PANTOPRAZOLE SODIUM 40 MG: 40 TABLET, DELAYED RELEASE ORAL at 06:21

## 2018-01-01 RX ADMIN — ENOXAPARIN SODIUM 40 MG: 40 INJECTION SUBCUTANEOUS at 09:42

## 2018-01-01 RX ADMIN — ENOXAPARIN SODIUM 40 MG: 40 INJECTION SUBCUTANEOUS at 08:42

## 2018-01-01 RX ADMIN — IPRATROPIUM BROMIDE AND ALBUTEROL SULFATE 1 AMPULE: 2.5; .5 SOLUTION RESPIRATORY (INHALATION) at 09:00

## 2018-01-01 RX ADMIN — OXYCODONE HYDROCHLORIDE AND ACETAMINOPHEN 1 TABLET: 5; 325 TABLET ORAL at 00:02

## 2018-01-01 RX ADMIN — Medication 10 ML: at 01:46

## 2018-01-01 RX ADMIN — OXYCODONE HYDROCHLORIDE AND ACETAMINOPHEN 1 TABLET: 5; 325 TABLET ORAL at 12:33

## 2018-01-01 RX ADMIN — GABAPENTIN 600 MG: 300 CAPSULE ORAL at 23:53

## 2018-01-01 RX ADMIN — GLYCOPYRROLATE 0.2 MG: 0.2 INJECTION, SOLUTION INTRAMUSCULAR; INTRAVENOUS at 18:36

## 2018-01-01 RX ADMIN — HYDROMORPHONE HYDROCHLORIDE 0.5 MG: 2 INJECTION INTRAMUSCULAR; INTRAVENOUS; SUBCUTANEOUS at 17:25

## 2018-01-01 RX ADMIN — Medication 2000 UNITS: at 08:44

## 2018-01-01 RX ADMIN — POLYETHYLENE GLYCOL 3350 17 G: 17 POWDER, FOR SOLUTION ORAL at 10:00

## 2018-01-01 RX ADMIN — MULTIPLE VITAMINS W/ MINERALS TAB 1 TABLET: TAB at 09:39

## 2018-01-01 RX ADMIN — MULTIPLE VITAMINS W/ MINERALS TAB 1 TABLET: TAB at 08:35

## 2018-01-01 RX ADMIN — OXYCODONE HYDROCHLORIDE AND ACETAMINOPHEN 1 TABLET: 5; 325 TABLET ORAL at 08:42

## 2018-01-01 RX ADMIN — HEPARIN 100 UNITS: 100 SYRINGE at 01:59

## 2018-01-01 RX ADMIN — OXYCODONE HYDROCHLORIDE AND ACETAMINOPHEN 1 TABLET: 5; 325 TABLET ORAL at 15:59

## 2018-01-01 RX ADMIN — Medication 10 ML: at 02:02

## 2018-01-01 RX ADMIN — Medication 2000 UNITS: at 10:15

## 2018-01-01 RX ADMIN — DOXYCYCLINE 100 MG: 100 INJECTION, POWDER, LYOPHILIZED, FOR SOLUTION INTRAVENOUS at 18:37

## 2018-01-01 RX ADMIN — MULTIPLE VITAMINS W/ MINERALS TAB 1 TABLET: TAB at 09:27

## 2018-01-01 RX ADMIN — TAZOBACTAM SODIUM AND PIPERACILLIN SODIUM 3.38 G: 375; 3 INJECTION, SOLUTION INTRAVENOUS at 05:53

## 2018-01-01 RX ADMIN — FENTANYL CITRATE 50 MCG: 50 INJECTION, SOLUTION INTRAMUSCULAR; INTRAVENOUS at 21:50

## 2018-01-01 RX ADMIN — OXYCODONE HYDROCHLORIDE AND ACETAMINOPHEN 1 TABLET: 5; 325 TABLET ORAL at 14:13

## 2018-01-01 RX ADMIN — OXYCODONE HYDROCHLORIDE AND ACETAMINOPHEN 1 TABLET: 5; 325 TABLET ORAL at 03:24

## 2018-01-01 RX ADMIN — Medication 10 ML: at 08:30

## 2018-01-01 RX ADMIN — IPRATROPIUM BROMIDE AND ALBUTEROL SULFATE 1 AMPULE: 2.5; .5 SOLUTION RESPIRATORY (INHALATION) at 20:45

## 2018-01-01 RX ADMIN — LACTULOSE 10 G: 20 SOLUTION ORAL at 08:55

## 2018-01-01 RX ADMIN — IPRATROPIUM BROMIDE AND ALBUTEROL SULFATE 1 AMPULE: 2.5; .5 SOLUTION RESPIRATORY (INHALATION) at 21:45

## 2018-01-01 RX ADMIN — OXYCODONE HYDROCHLORIDE AND ACETAMINOPHEN 1 TABLET: 5; 325 TABLET ORAL at 09:17

## 2018-01-01 RX ADMIN — GABAPENTIN 300 MG: 300 CAPSULE ORAL at 12:19

## 2018-01-01 RX ADMIN — POLYETHYLENE GLYCOL 3350 17 G: 17 POWDER, FOR SOLUTION ORAL at 08:36

## 2018-01-01 RX ADMIN — OXYCODONE HYDROCHLORIDE AND ACETAMINOPHEN 1 TABLET: 5; 325 TABLET ORAL at 19:30

## 2018-01-01 RX ADMIN — Medication 10 ML: at 19:46

## 2018-01-01 RX ADMIN — PROPOFOL 200 MG: 10 INJECTION, EMULSION INTRAVENOUS at 19:25

## 2018-01-01 RX ADMIN — OXYCODONE HYDROCHLORIDE AND ACETAMINOPHEN 1 TABLET: 5; 325 TABLET ORAL at 22:32

## 2018-01-01 RX ADMIN — PANTOPRAZOLE SODIUM 40 MG: 40 TABLET, DELAYED RELEASE ORAL at 06:07

## 2018-01-01 RX ADMIN — ONDANSETRON 4 MG: 2 INJECTION INTRAMUSCULAR; INTRAVENOUS at 20:02

## 2018-01-01 RX ADMIN — OXYCODONE HYDROCHLORIDE AND ACETAMINOPHEN 1 TABLET: 5; 325 TABLET ORAL at 06:08

## 2018-01-01 RX ADMIN — OXYCODONE HYDROCHLORIDE AND ACETAMINOPHEN 1 TABLET: 5; 325 TABLET ORAL at 10:22

## 2018-01-01 RX ADMIN — OXYCODONE HYDROCHLORIDE AND ACETAMINOPHEN 1 TABLET: 5; 325 TABLET ORAL at 07:00

## 2018-01-01 RX ADMIN — LACTULOSE 20 G: 20 SOLUTION ORAL at 17:30

## 2018-01-01 RX ADMIN — LABETALOL HYDROCHLORIDE 5 MG: 5 INJECTION INTRAVENOUS at 21:20

## 2018-01-01 RX ADMIN — IPRATROPIUM BROMIDE AND ALBUTEROL SULFATE 1 AMPULE: 2.5; .5 SOLUTION RESPIRATORY (INHALATION) at 13:20

## 2018-01-01 RX ADMIN — Medication 10 ML: at 08:36

## 2018-01-01 RX ADMIN — OXYCODONE HYDROCHLORIDE AND ACETAMINOPHEN 1 TABLET: 5; 325 TABLET ORAL at 03:45

## 2018-01-01 RX ADMIN — SENNOSIDES AND DOCUSATE SODIUM 2 TABLET: 8.6; 5 TABLET ORAL at 00:12

## 2018-01-01 RX ADMIN — OXYCODONE HYDROCHLORIDE AND ACETAMINOPHEN 1 TABLET: 5; 325 TABLET ORAL at 12:07

## 2018-01-01 RX ADMIN — LACTULOSE 10 G: 20 SOLUTION ORAL at 09:19

## 2018-01-01 RX ADMIN — VITAMIN C 1 TABLET: TAB at 08:51

## 2018-01-01 RX ADMIN — METHYLPREDNISOLONE SODIUM SUCCINATE 125 MG: 125 INJECTION, POWDER, FOR SOLUTION INTRAMUSCULAR; INTRAVENOUS at 15:15

## 2018-01-01 RX ADMIN — ENOXAPARIN SODIUM 40 MG: 40 INJECTION SUBCUTANEOUS at 08:31

## 2018-01-01 RX ADMIN — ENOXAPARIN SODIUM 40 MG: 40 INJECTION SUBCUTANEOUS at 09:37

## 2018-01-01 RX ADMIN — PANTOPRAZOLE SODIUM 40 MG: 40 TABLET, DELAYED RELEASE ORAL at 06:54

## 2018-01-01 RX ADMIN — IPRATROPIUM BROMIDE AND ALBUTEROL SULFATE 1 AMPULE: 2.5; .5 SOLUTION RESPIRATORY (INHALATION) at 20:21

## 2018-01-01 RX ADMIN — BISACODYL 10 MG: 10 SUPPOSITORY RECTAL at 08:36

## 2018-01-01 RX ADMIN — CALCIUM CARBONATE-VITAMIN D TAB 500 MG-200 UNIT 1 TABLET: 500-200 TAB at 19:45

## 2018-01-01 RX ADMIN — HYDROMORPHONE HYDROCHLORIDE 0.5 MG: 2 INJECTION INTRAMUSCULAR; INTRAVENOUS; SUBCUTANEOUS at 15:19

## 2018-01-01 RX ADMIN — Medication 10 ML: at 00:36

## 2018-01-01 RX ADMIN — OXYCODONE HYDROCHLORIDE AND ACETAMINOPHEN 1 TABLET: 10; 325 TABLET ORAL at 01:13

## 2018-01-01 RX ADMIN — Medication 2000 UNITS: at 08:47

## 2018-01-01 RX ADMIN — IPRATROPIUM BROMIDE AND ALBUTEROL SULFATE 1 AMPULE: 2.5; .5 SOLUTION RESPIRATORY (INHALATION) at 20:35

## 2018-01-01 RX ADMIN — OXYCODONE HYDROCHLORIDE AND ACETAMINOPHEN 1 TABLET: 5; 325 TABLET ORAL at 10:00

## 2018-01-01 RX ADMIN — OXYCODONE HYDROCHLORIDE AND ACETAMINOPHEN 1 TABLET: 5; 325 TABLET ORAL at 21:10

## 2018-01-01 RX ADMIN — STANDARDIZED SENNA CONCENTRATE AND DOCUSATE SODIUM 2 TABLET: 8.6; 5 TABLET, FILM COATED ORAL at 08:13

## 2018-01-01 RX ADMIN — ENOXAPARIN SODIUM 40 MG: 40 INJECTION SUBCUTANEOUS at 09:22

## 2018-01-01 RX ADMIN — Medication 10 ML: at 18:33

## 2018-01-01 RX ADMIN — DEXAMETHASONE SODIUM PHOSPHATE 10 MG: 10 INJECTION INTRAMUSCULAR; INTRAVENOUS at 09:20

## 2018-01-01 RX ADMIN — MORPHINE SULFATE 2 MG: 2 INJECTION, SOLUTION INTRAMUSCULAR; INTRAVENOUS at 06:16

## 2018-01-01 RX ADMIN — OXYCODONE HYDROCHLORIDE AND ACETAMINOPHEN 1 TABLET: 5; 325 TABLET ORAL at 12:02

## 2018-01-01 RX ADMIN — IPRATROPIUM BROMIDE AND ALBUTEROL SULFATE 1 AMPULE: 2.5; .5 SOLUTION RESPIRATORY (INHALATION) at 16:30

## 2018-01-01 RX ADMIN — SODIUM CHLORIDE: 9 INJECTION, SOLUTION INTRAVENOUS at 01:00

## 2018-01-01 RX ADMIN — ENOXAPARIN SODIUM 40 MG: 40 INJECTION SUBCUTANEOUS at 09:20

## 2018-01-01 RX ADMIN — LACTULOSE 20 G: 20 SOLUTION ORAL at 21:26

## 2018-01-01 RX ADMIN — Medication 400 MG: at 23:46

## 2018-01-01 RX ADMIN — MULTIPLE VITAMINS W/ MINERALS TAB 1 TABLET: TAB at 08:58

## 2018-01-01 RX ADMIN — PANTOPRAZOLE SODIUM 40 MG: 40 TABLET, DELAYED RELEASE ORAL at 06:52

## 2018-01-01 RX ADMIN — IPRATROPIUM BROMIDE AND ALBUTEROL SULFATE 1 AMPULE: 2.5; .5 SOLUTION RESPIRATORY (INHALATION) at 12:40

## 2018-01-01 RX ADMIN — Medication 2000 UNITS: at 08:51

## 2018-01-01 RX ADMIN — Medication 10 ML: at 23:57

## 2018-01-01 RX ADMIN — PANTOPRAZOLE SODIUM 40 MG: 40 TABLET, DELAYED RELEASE ORAL at 06:36

## 2018-01-01 RX ADMIN — Medication 2000 UNITS: at 08:43

## 2018-01-01 RX ADMIN — GLYCOPYRROLATE 0.6 MG: 0.2 INJECTION, SOLUTION INTRAMUSCULAR; INTRAVENOUS at 21:33

## 2018-01-01 RX ADMIN — OXYCODONE HYDROCHLORIDE AND ACETAMINOPHEN 1 TABLET: 5; 325 TABLET ORAL at 22:08

## 2018-01-01 RX ADMIN — Medication 10 ML: at 09:20

## 2018-01-01 RX ADMIN — OXYCODONE HYDROCHLORIDE AND ACETAMINOPHEN 1 TABLET: 5; 325 TABLET ORAL at 11:33

## 2018-01-01 RX ADMIN — VITAMIN C 1 TABLET: TAB at 08:33

## 2018-01-01 RX ADMIN — OXYCODONE HYDROCHLORIDE AND ACETAMINOPHEN 1 TABLET: 5; 325 TABLET ORAL at 19:18

## 2018-01-01 RX ADMIN — IPRATROPIUM BROMIDE AND ALBUTEROL SULFATE 1 AMPULE: .5; 3 SOLUTION RESPIRATORY (INHALATION) at 09:44

## 2018-01-01 RX ADMIN — POLYETHYLENE GLYCOL 3350 17 G: 17 POWDER, FOR SOLUTION ORAL at 09:38

## 2018-01-01 RX ADMIN — MORPHINE SULFATE 2 MG: 2 INJECTION, SOLUTION INTRAMUSCULAR; INTRAVENOUS at 03:00

## 2018-01-01 RX ADMIN — OXYCODONE HYDROCHLORIDE AND ACETAMINOPHEN 1 TABLET: 5; 325 TABLET ORAL at 21:19

## 2018-01-01 RX ADMIN — Medication 2000 UNITS: at 09:15

## 2018-01-01 RX ADMIN — ALBUMIN (HUMAN) 25 G: 0.25 INJECTION, SOLUTION INTRAVENOUS at 10:53

## 2018-01-01 RX ADMIN — HEPARIN 100 UNITS: 100 SYRINGE at 00:09

## 2018-01-01 RX ADMIN — POTASSIUM CHLORIDE 40 MEQ: 20 TABLET, EXTENDED RELEASE ORAL at 09:51

## 2018-01-01 RX ADMIN — METHYLPREDNISOLONE SODIUM SUCCINATE 60 MG: 125 INJECTION, POWDER, FOR SOLUTION INTRAMUSCULAR; INTRAVENOUS at 13:11

## 2018-01-01 RX ADMIN — OXYCODONE HYDROCHLORIDE AND ACETAMINOPHEN 1 TABLET: 5; 325 TABLET ORAL at 14:39

## 2018-01-01 RX ADMIN — IPRATROPIUM BROMIDE AND ALBUTEROL SULFATE 1 AMPULE: .5; 3 SOLUTION RESPIRATORY (INHALATION) at 09:39

## 2018-01-01 RX ADMIN — MULTIPLE VITAMINS W/ MINERALS TAB 1 TABLET: TAB at 08:33

## 2018-01-01 RX ADMIN — VITAMIN C 1 TABLET: TAB at 09:26

## 2018-01-01 RX ADMIN — WATER 1 G: 1 INJECTION, SOLUTION INTRAVENOUS at 23:53

## 2018-01-01 RX ADMIN — OXYCODONE HYDROCHLORIDE AND ACETAMINOPHEN 1 TABLET: 5; 325 TABLET ORAL at 22:54

## 2018-01-01 RX ADMIN — MORPHINE SULFATE 10 MG: 100 SOLUTION ORAL at 13:51

## 2018-01-01 RX ADMIN — PANTOPRAZOLE SODIUM 40 MG: 40 TABLET, DELAYED RELEASE ORAL at 07:28

## 2018-01-01 RX ADMIN — ENOXAPARIN SODIUM 40 MG: 40 INJECTION SUBCUTANEOUS at 08:53

## 2018-01-01 RX ADMIN — MULTIPLE VITAMINS W/ MINERALS TAB 1 TABLET: TAB at 09:15

## 2018-01-01 RX ADMIN — Medication 2000 UNITS: at 12:07

## 2018-01-01 RX ADMIN — LACTULOSE 20 G: 20 SOLUTION ORAL at 21:27

## 2018-01-01 RX ADMIN — Medication 2000 UNITS: at 09:20

## 2018-01-01 RX ADMIN — DEXAMETHASONE SODIUM PHOSPHATE 4 MG: 4 INJECTION, SOLUTION INTRAMUSCULAR; INTRAVENOUS at 06:52

## 2018-01-01 RX ADMIN — OXYCODONE HYDROCHLORIDE AND ACETAMINOPHEN 1 TABLET: 5; 325 TABLET ORAL at 20:24

## 2018-01-01 RX ADMIN — WATER 1 G: 1 INJECTION INTRAMUSCULAR; INTRAVENOUS; SUBCUTANEOUS at 18:10

## 2018-01-01 RX ADMIN — METHYLPREDNISOLONE 4 MG: 4 TABLET ORAL at 06:33

## 2018-01-01 RX ADMIN — OXYCODONE HYDROCHLORIDE AND ACETAMINOPHEN 1 TABLET: 5; 325 TABLET ORAL at 09:15

## 2018-01-01 RX ADMIN — ALBUMIN (HUMAN) 25 G: 0.25 INJECTION, SOLUTION INTRAVENOUS at 23:46

## 2018-01-01 RX ADMIN — IPRATROPIUM BROMIDE AND ALBUTEROL SULFATE 1 AMPULE: 2.5; .5 SOLUTION RESPIRATORY (INHALATION) at 06:55

## 2018-01-01 RX ADMIN — ENOXAPARIN SODIUM 40 MG: 40 INJECTION SUBCUTANEOUS at 09:50

## 2018-01-01 RX ADMIN — LIDOCAINE HYDROCHLORIDE 100 MG: 20 INJECTION, SOLUTION INFILTRATION; PERINEURAL at 19:25

## 2018-01-01 RX ADMIN — IPRATROPIUM BROMIDE AND ALBUTEROL SULFATE 1 AMPULE: 2.5; .5 SOLUTION RESPIRATORY (INHALATION) at 13:45

## 2018-01-01 RX ADMIN — GABAPENTIN 300 MG: 300 CAPSULE ORAL at 13:24

## 2018-01-01 RX ADMIN — OXYCODONE HYDROCHLORIDE AND ACETAMINOPHEN 1 TABLET: 5; 325 TABLET ORAL at 13:58

## 2018-01-01 RX ADMIN — OXYCODONE HYDROCHLORIDE AND ACETAMINOPHEN 1 TABLET: 5; 325 TABLET ORAL at 14:24

## 2018-01-01 RX ADMIN — Medication 2000 UNITS: at 12:01

## 2018-01-01 RX ADMIN — PANTOPRAZOLE SODIUM 40 MG: 40 TABLET, DELAYED RELEASE ORAL at 06:49

## 2018-01-01 RX ADMIN — LACTULOSE 10 G: 20 SOLUTION ORAL at 10:00

## 2018-01-01 RX ADMIN — IPRATROPIUM BROMIDE AND ALBUTEROL SULFATE 1 AMPULE: 2.5; .5 SOLUTION RESPIRATORY (INHALATION) at 07:52

## 2018-01-01 RX ADMIN — IPRATROPIUM BROMIDE AND ALBUTEROL SULFATE 1 AMPULE: 2.5; .5 SOLUTION RESPIRATORY (INHALATION) at 16:04

## 2018-01-01 RX ADMIN — POLYETHYLENE GLYCOL 3350 17 G: 17 POWDER, FOR SOLUTION ORAL at 20:29

## 2018-01-01 RX ADMIN — PANTOPRAZOLE SODIUM 40 MG: 40 TABLET, DELAYED RELEASE ORAL at 06:34

## 2018-01-01 RX ADMIN — VITAMIN C 1 TABLET: TAB at 09:39

## 2018-01-01 RX ADMIN — VANCOMYCIN HYDROCHLORIDE 1000 MG: 1 INJECTION, POWDER, LYOPHILIZED, FOR SOLUTION INTRAVENOUS at 15:04

## 2018-01-01 RX ADMIN — Medication 10 ML: at 10:06

## 2018-01-01 RX ADMIN — IPRATROPIUM BROMIDE AND ALBUTEROL SULFATE 1 AMPULE: 2.5; .5 SOLUTION RESPIRATORY (INHALATION) at 21:33

## 2018-01-01 RX ADMIN — Medication 2000 UNITS: at 08:54

## 2018-01-01 RX ADMIN — MORPHINE SULFATE 10 MG: 100 SOLUTION ORAL at 02:00

## 2018-01-01 RX ADMIN — OXYCODONE HYDROCHLORIDE AND ACETAMINOPHEN 1 TABLET: 5; 325 TABLET ORAL at 11:16

## 2018-01-01 RX ADMIN — MULTIPLE VITAMINS W/ MINERALS TAB 1 TABLET: TAB at 08:42

## 2018-01-01 RX ADMIN — LEVOFLOXACIN 250 MG: 250 TABLET, FILM COATED ORAL at 10:04

## 2018-01-01 RX ADMIN — Medication 2000 UNITS: at 09:19

## 2018-01-01 RX ADMIN — Medication 10 ML: at 19:29

## 2018-01-01 RX ADMIN — POTASSIUM CHLORIDE 40 MEQ: 20 TABLET, EXTENDED RELEASE ORAL at 23:45

## 2018-01-01 RX ADMIN — LACTULOSE 20 G: 20 SOLUTION ORAL at 09:41

## 2018-01-01 RX ADMIN — OXYCODONE HYDROCHLORIDE AND ACETAMINOPHEN 1 TABLET: 5; 325 TABLET ORAL at 20:45

## 2018-01-01 RX ADMIN — VITAMIN C 1 TABLET: TAB at 09:20

## 2018-01-01 RX ADMIN — OXYCODONE HYDROCHLORIDE AND ACETAMINOPHEN 1 TABLET: 5; 325 TABLET ORAL at 19:06

## 2018-01-01 RX ADMIN — STANDARDIZED SENNA CONCENTRATE AND DOCUSATE SODIUM 2 TABLET: 8.6; 5 TABLET, FILM COATED ORAL at 09:06

## 2018-01-01 RX ADMIN — OXYCODONE HYDROCHLORIDE AND ACETAMINOPHEN 1 TABLET: 5; 325 TABLET ORAL at 14:54

## 2018-01-01 RX ADMIN — OXYCODONE HYDROCHLORIDE AND ACETAMINOPHEN 1 TABLET: 5; 325 TABLET ORAL at 08:52

## 2018-01-01 RX ADMIN — VITAMIN C 1 TABLET: TAB at 09:19

## 2018-01-01 RX ADMIN — OXYCODONE HYDROCHLORIDE AND ACETAMINOPHEN 1 TABLET: 5; 325 TABLET ORAL at 17:37

## 2018-01-01 RX ADMIN — MULTIPLE VITAMINS W/ MINERALS TAB 1 TABLET: TAB at 09:07

## 2018-01-01 RX ADMIN — ENOXAPARIN SODIUM 40 MG: 40 INJECTION SUBCUTANEOUS at 08:55

## 2018-01-01 RX ADMIN — OXYCODONE HYDROCHLORIDE AND ACETAMINOPHEN 1 TABLET: 5; 325 TABLET ORAL at 17:20

## 2018-01-01 RX ADMIN — TAZOBACTAM SODIUM AND PIPERACILLIN SODIUM 3.38 G: 375; 3 INJECTION, SOLUTION INTRAVENOUS at 05:31

## 2018-01-01 RX ADMIN — VITAMIN C 1 TABLET: TAB at 11:41

## 2018-01-01 RX ADMIN — SODIUM CHLORIDE 1000 ML: 9 INJECTION, SOLUTION INTRAVENOUS at 15:15

## 2018-01-01 RX ADMIN — MORPHINE SULFATE 10 MG: 100 SOLUTION ORAL at 05:32

## 2018-01-01 RX ADMIN — VITAMIN C 1 TABLET: TAB at 08:43

## 2018-01-01 RX ADMIN — METHYLPREDNISOLONE 24 MG: 4 TABLET ORAL at 13:51

## 2018-01-01 RX ADMIN — MORPHINE SULFATE 2 MG: 2 INJECTION, SOLUTION INTRAMUSCULAR; INTRAVENOUS at 13:57

## 2018-01-01 RX ADMIN — VITAMIN C 1 TABLET: TAB at 09:22

## 2018-01-01 RX ADMIN — Medication 10 ML: at 08:49

## 2018-01-01 RX ADMIN — MORPHINE SULFATE 2 MG: 2 INJECTION, SOLUTION INTRAMUSCULAR; INTRAVENOUS at 06:33

## 2018-01-01 RX ADMIN — Medication 10 ML: at 05:56

## 2018-01-01 RX ADMIN — ROCURONIUM BROMIDE 50 MG: 10 INJECTION, SOLUTION INTRAVENOUS at 19:25

## 2018-01-01 RX ADMIN — MULTIPLE VITAMINS W/ MINERALS TAB 1 TABLET: TAB at 12:00

## 2018-01-01 RX ADMIN — VITAMIN C 1 TABLET: TAB at 09:47

## 2018-01-01 RX ADMIN — FUROSEMIDE 20 MG: 10 INJECTION, SOLUTION INTRAMUSCULAR; INTRAVENOUS at 13:11

## 2018-01-01 RX ADMIN — OXYCODONE HYDROCHLORIDE AND ACETAMINOPHEN 1 TABLET: 5; 325 TABLET ORAL at 07:54

## 2018-01-01 RX ADMIN — OXYCODONE HYDROCHLORIDE AND ACETAMINOPHEN 1 TABLET: 5; 325 TABLET ORAL at 03:27

## 2018-01-01 RX ADMIN — ENOXAPARIN SODIUM 40 MG: 40 INJECTION SUBCUTANEOUS at 08:25

## 2018-01-01 RX ADMIN — OXYCODONE HYDROCHLORIDE AND ACETAMINOPHEN 1 TABLET: 5; 325 TABLET ORAL at 17:44

## 2018-01-01 RX ADMIN — Medication 10 ML: at 17:46

## 2018-01-01 RX ADMIN — VITAMIN C 1 TABLET: TAB at 08:17

## 2018-01-01 RX ADMIN — Medication 10 ML: at 08:42

## 2018-01-01 RX ADMIN — OXYCODONE HYDROCHLORIDE AND ACETAMINOPHEN 1 TABLET: 5; 325 TABLET ORAL at 09:30

## 2018-01-01 RX ADMIN — SENNOSIDES AND DOCUSATE SODIUM 2 TABLET: 8.6; 5 TABLET ORAL at 08:57

## 2018-01-01 RX ADMIN — OXYCODONE HYDROCHLORIDE AND ACETAMINOPHEN 1 TABLET: 5; 325 TABLET ORAL at 21:34

## 2018-01-01 RX ADMIN — IPRATROPIUM BROMIDE AND ALBUTEROL SULFATE 1 AMPULE: .5; 3 SOLUTION RESPIRATORY (INHALATION) at 17:22

## 2018-01-01 RX ADMIN — VITAMIN C 1 TABLET: TAB at 09:27

## 2018-01-01 RX ADMIN — IPRATROPIUM BROMIDE AND ALBUTEROL SULFATE 1 AMPULE: .5; 3 SOLUTION RESPIRATORY (INHALATION) at 14:33

## 2018-01-01 RX ADMIN — HEPARIN 100 UNITS: 100 SYRINGE at 13:50

## 2018-01-01 RX ADMIN — ENOXAPARIN SODIUM 40 MG: 40 INJECTION SUBCUTANEOUS at 08:21

## 2018-01-01 RX ADMIN — PANTOPRAZOLE SODIUM 40 MG: 40 TABLET, DELAYED RELEASE ORAL at 06:39

## 2018-01-01 RX ADMIN — OXYCODONE HYDROCHLORIDE AND ACETAMINOPHEN 1 TABLET: 5; 325 TABLET ORAL at 16:51

## 2018-01-01 RX ADMIN — POLYETHYLENE GLYCOL 3350 17 G: 17 POWDER, FOR SOLUTION ORAL at 08:31

## 2018-01-01 RX ADMIN — OXYCODONE HYDROCHLORIDE AND ACETAMINOPHEN 1 TABLET: 5; 325 TABLET ORAL at 07:56

## 2018-01-01 RX ADMIN — IPRATROPIUM BROMIDE AND ALBUTEROL SULFATE 1 AMPULE: .5; 3 SOLUTION RESPIRATORY (INHALATION) at 06:25

## 2018-01-01 RX ADMIN — PANTOPRAZOLE SODIUM 40 MG: 40 TABLET, DELAYED RELEASE ORAL at 06:55

## 2018-01-01 RX ADMIN — HEPARIN 100 UNITS: 100 SYRINGE at 13:13

## 2018-01-01 RX ADMIN — OXYCODONE HYDROCHLORIDE AND ACETAMINOPHEN 1 TABLET: 5; 325 TABLET ORAL at 03:25

## 2018-01-01 RX ADMIN — CALCIUM CARBONATE-VITAMIN D TAB 500 MG-200 UNIT 1 TABLET: 500-200 TAB at 09:30

## 2018-01-01 RX ADMIN — Medication 10 ML: at 00:09

## 2018-01-01 RX ADMIN — PANTOPRAZOLE SODIUM 40 MG: 40 TABLET, DELAYED RELEASE ORAL at 06:26

## 2018-01-01 RX ADMIN — IPRATROPIUM BROMIDE AND ALBUTEROL SULFATE 1 AMPULE: 2.5; .5 SOLUTION RESPIRATORY (INHALATION) at 08:06

## 2018-01-01 RX ADMIN — LACTULOSE 20 G: 20 SOLUTION ORAL at 08:34

## 2018-01-01 RX ADMIN — Medication 2000 UNITS: at 09:22

## 2018-01-01 RX ADMIN — POLYETHYLENE GLYCOL 3350 17 G: 17 POWDER, FOR SOLUTION ORAL at 09:15

## 2018-01-01 RX ADMIN — METHYLPREDNISOLONE 4 MG: 4 TABLET ORAL at 06:39

## 2018-01-01 RX ADMIN — CALCIUM CARBONATE-VITAMIN D TAB 500 MG-200 UNIT 1 TABLET: 500-200 TAB at 23:06

## 2018-01-01 RX ADMIN — Medication 10 ML: at 09:44

## 2018-01-01 RX ADMIN — TAZOBACTAM SODIUM AND PIPERACILLIN SODIUM 3.38 G: 375; 3 INJECTION, SOLUTION INTRAVENOUS at 06:29

## 2018-01-01 RX ADMIN — POLYETHYLENE GLYCOL 3350 17 G: 17 POWDER, FOR SOLUTION ORAL at 08:12

## 2018-01-01 RX ADMIN — LACTULOSE 10 G: 20 SOLUTION ORAL at 09:07

## 2018-01-01 RX ADMIN — PANTOPRAZOLE SODIUM 40 MG: 40 TABLET, DELAYED RELEASE ORAL at 07:07

## 2018-01-01 RX ADMIN — OXYCODONE HYDROCHLORIDE AND ACETAMINOPHEN 1 TABLET: 5; 325 TABLET ORAL at 03:37

## 2018-01-01 RX ADMIN — IPRATROPIUM BROMIDE AND ALBUTEROL SULFATE 1 AMPULE: 2.5; .5 SOLUTION RESPIRATORY (INHALATION) at 07:45

## 2018-01-01 RX ADMIN — OXYCODONE HYDROCHLORIDE AND ACETAMINOPHEN 1 TABLET: 5; 325 TABLET ORAL at 22:50

## 2018-01-01 RX ADMIN — IPRATROPIUM BROMIDE AND ALBUTEROL SULFATE 1 AMPULE: .5; 3 SOLUTION RESPIRATORY (INHALATION) at 16:46

## 2018-01-01 RX ADMIN — PANTOPRAZOLE SODIUM 40 MG: 40 TABLET, DELAYED RELEASE ORAL at 06:48

## 2018-01-01 RX ADMIN — METHYLPREDNISOLONE 4 MG: 4 TABLET ORAL at 17:20

## 2018-01-01 RX ADMIN — METHYLPREDNISOLONE 4 MG: 4 TABLET ORAL at 11:31

## 2018-01-01 RX ADMIN — MULTIPLE VITAMINS W/ MINERALS TAB 1 TABLET: TAB at 09:14

## 2018-01-01 RX ADMIN — MULTIPLE VITAMINS W/ MINERALS TAB 1 TABLET: TAB at 09:38

## 2018-01-01 RX ADMIN — OXYCODONE HYDROCHLORIDE AND ACETAMINOPHEN 1 TABLET: 5; 325 TABLET ORAL at 20:41

## 2018-01-01 RX ADMIN — IPRATROPIUM BROMIDE AND ALBUTEROL SULFATE 1 AMPULE: 2.5; .5 SOLUTION RESPIRATORY (INHALATION) at 19:50

## 2018-01-01 RX ADMIN — LABETALOL HYDROCHLORIDE 5 MG: 5 INJECTION INTRAVENOUS at 20:45

## 2018-01-01 RX ADMIN — MULTIPLE VITAMINS W/ MINERALS TAB 1 TABLET: TAB at 09:04

## 2018-01-01 RX ADMIN — OXYCODONE HYDROCHLORIDE AND ACETAMINOPHEN 1 TABLET: 5; 325 TABLET ORAL at 00:40

## 2018-01-01 RX ADMIN — Medication 10 ML: at 20:51

## 2018-01-01 RX ADMIN — IPRATROPIUM BROMIDE AND ALBUTEROL SULFATE 1 AMPULE: 2.5; .5 SOLUTION RESPIRATORY (INHALATION) at 17:20

## 2018-01-01 RX ADMIN — Medication 10 ML: at 19:12

## 2018-01-01 RX ADMIN — VITAMIN C 1 TABLET: TAB at 08:12

## 2018-01-01 RX ADMIN — MULTIPLE VITAMINS W/ MINERALS TAB 1 TABLET: TAB at 08:29

## 2018-01-01 RX ADMIN — Medication 2000 UNITS: at 08:21

## 2018-01-01 RX ADMIN — Medication 10 ML: at 22:00

## 2018-01-01 RX ADMIN — VITAMIN C 1 TABLET: TAB at 08:47

## 2018-01-01 RX ADMIN — IPRATROPIUM BROMIDE AND ALBUTEROL SULFATE 1 AMPULE: .5; 3 SOLUTION RESPIRATORY (INHALATION) at 05:31

## 2018-01-01 RX ADMIN — IPRATROPIUM BROMIDE AND ALBUTEROL SULFATE 1 AMPULE: 2.5; .5 SOLUTION RESPIRATORY (INHALATION) at 02:06

## 2018-01-01 RX ADMIN — ALBUMIN (HUMAN) 25 G: 0.25 INJECTION, SOLUTION INTRAVENOUS at 18:10

## 2018-01-01 RX ADMIN — TAZOBACTAM SODIUM AND PIPERACILLIN SODIUM 3.38 G: 375; 3 INJECTION, SOLUTION INTRAVENOUS at 00:11

## 2018-01-01 RX ADMIN — OXYCODONE HYDROCHLORIDE AND ACETAMINOPHEN 1 TABLET: 5; 325 TABLET ORAL at 02:56

## 2018-01-01 RX ADMIN — Medication 10 ML: at 20:24

## 2018-01-01 RX ADMIN — OXYCODONE HYDROCHLORIDE AND ACETAMINOPHEN 1 TABLET: 5; 325 TABLET ORAL at 18:13

## 2018-01-01 RX ADMIN — VITAMIN C 1 TABLET: TAB at 08:56

## 2018-01-01 RX ADMIN — LACTULOSE 10 G: 20 SOLUTION ORAL at 09:27

## 2018-01-01 RX ADMIN — SENNOSIDES AND DOCUSATE SODIUM 2 TABLET: 8.6; 5 TABLET ORAL at 23:45

## 2018-01-01 RX ADMIN — Medication 2000 UNITS: at 08:17

## 2018-01-01 RX ADMIN — OXYCODONE HYDROCHLORIDE AND ACETAMINOPHEN 1 TABLET: 10; 325 TABLET ORAL at 04:59

## 2018-01-01 RX ADMIN — OXYCODONE HYDROCHLORIDE AND ACETAMINOPHEN 1 TABLET: 5; 325 TABLET ORAL at 11:54

## 2018-01-01 RX ADMIN — DEXAMETHASONE SODIUM PHOSPHATE 10 MG: 10 INJECTION INTRAMUSCULAR; INTRAVENOUS at 00:25

## 2018-01-01 RX ADMIN — PANTOPRAZOLE SODIUM 40 MG: 40 TABLET, DELAYED RELEASE ORAL at 05:44

## 2018-01-01 RX ADMIN — IPRATROPIUM BROMIDE AND ALBUTEROL SULFATE 1 AMPULE: 2.5; .5 SOLUTION RESPIRATORY (INHALATION) at 11:41

## 2018-01-01 RX ADMIN — IPRATROPIUM BROMIDE AND ALBUTEROL SULFATE 1 AMPULE: .5; 3 SOLUTION RESPIRATORY (INHALATION) at 09:18

## 2018-01-01 RX ADMIN — NALOXONE HYDROCHLORIDE 0.4 MG: 0.4 INJECTION, SOLUTION INTRAMUSCULAR; INTRAVENOUS; SUBCUTANEOUS at 14:52

## 2018-01-01 RX ADMIN — GABAPENTIN 300 MG: 300 CAPSULE ORAL at 08:19

## 2018-01-01 RX ADMIN — PHENYLEPHRINE HYDROCHLORIDE 100 MCG: 10 INJECTION INTRAMUSCULAR; INTRAVENOUS; SUBCUTANEOUS at 19:28

## 2018-01-01 RX ADMIN — OXYCODONE HYDROCHLORIDE AND ACETAMINOPHEN 1 TABLET: 5; 325 TABLET ORAL at 04:21

## 2018-01-01 RX ADMIN — OXYCODONE HYDROCHLORIDE AND ACETAMINOPHEN 1 TABLET: 5; 325 TABLET ORAL at 02:50

## 2018-01-01 RX ADMIN — Medication 10 ML: at 09:48

## 2018-01-01 RX ADMIN — OXYCODONE HYDROCHLORIDE AND ACETAMINOPHEN 1 TABLET: 5; 325 TABLET ORAL at 08:18

## 2018-01-01 RX ADMIN — LACTULOSE 20 G: 20 SOLUTION ORAL at 14:02

## 2018-01-01 RX ADMIN — Medication 10 ML: at 18:18

## 2018-01-01 RX ADMIN — MULTIPLE VITAMINS W/ MINERALS TAB 1 TABLET: TAB at 08:55

## 2018-01-01 RX ADMIN — HYDROCODONE BITARTRATE AND ACETAMINOPHEN 2 TABLET: 5; 325 TABLET ORAL at 23:06

## 2018-01-01 RX ADMIN — WATER 1 G: 1 INJECTION INTRAMUSCULAR; INTRAVENOUS; SUBCUTANEOUS at 18:33

## 2018-01-01 RX ADMIN — PANTOPRAZOLE SODIUM 40 MG: 40 TABLET, DELAYED RELEASE ORAL at 06:47

## 2018-01-01 RX ADMIN — OXYCODONE HYDROCHLORIDE AND ACETAMINOPHEN 1 TABLET: 5; 325 TABLET ORAL at 21:25

## 2018-01-01 RX ADMIN — WATER 1 G: 1 INJECTION INTRAMUSCULAR; INTRAVENOUS; SUBCUTANEOUS at 17:52

## 2018-01-01 RX ADMIN — DEXAMETHASONE SODIUM PHOSPHATE 4 MG: 4 INJECTION, SOLUTION INTRAMUSCULAR; INTRAVENOUS at 09:30

## 2018-01-01 RX ADMIN — PHENYLEPHRINE HYDROCHLORIDE 100 MCG: 10 INJECTION INTRAMUSCULAR; INTRAVENOUS; SUBCUTANEOUS at 19:50

## 2018-01-01 RX ADMIN — TAZOBACTAM SODIUM AND PIPERACILLIN SODIUM 3.38 G: 375; 3 INJECTION, SOLUTION INTRAVENOUS at 15:03

## 2018-01-01 RX ADMIN — LACTULOSE 20 G: 20 SOLUTION ORAL at 20:50

## 2018-01-01 RX ADMIN — IPRATROPIUM BROMIDE AND ALBUTEROL SULFATE 1 AMPULE: 2.5; .5 SOLUTION RESPIRATORY (INHALATION) at 06:31

## 2018-01-01 RX ADMIN — IPRATROPIUM BROMIDE AND ALBUTEROL SULFATE 1 AMPULE: 2.5; .5 SOLUTION RESPIRATORY (INHALATION) at 12:22

## 2018-01-01 RX ADMIN — DEXAMETHASONE SODIUM PHOSPHATE 4 MG: 4 INJECTION, SOLUTION INTRAMUSCULAR; INTRAVENOUS at 19:12

## 2018-01-01 RX ADMIN — DEXAMETHASONE SODIUM PHOSPHATE 4 MG: 4 INJECTION, SOLUTION INTRAMUSCULAR; INTRAVENOUS at 22:05

## 2018-01-01 RX ADMIN — IPRATROPIUM BROMIDE AND ALBUTEROL SULFATE 1 AMPULE: .5; 3 SOLUTION RESPIRATORY (INHALATION) at 14:38

## 2018-01-01 RX ADMIN — OXYCODONE HYDROCHLORIDE AND ACETAMINOPHEN 1 TABLET: 5; 325 TABLET ORAL at 00:49

## 2018-01-01 RX ADMIN — LACTULOSE 20 G: 20 SOLUTION ORAL at 12:38

## 2018-01-01 RX ADMIN — PANTOPRAZOLE SODIUM 40 MG: 40 TABLET, DELAYED RELEASE ORAL at 07:08

## 2018-01-01 RX ADMIN — PANTOPRAZOLE SODIUM 40 MG: 40 TABLET, DELAYED RELEASE ORAL at 07:05

## 2018-01-01 RX ADMIN — IPRATROPIUM BROMIDE AND ALBUTEROL SULFATE 1 AMPULE: 2.5; .5 SOLUTION RESPIRATORY (INHALATION) at 20:59

## 2018-01-01 RX ADMIN — IPRATROPIUM BROMIDE AND ALBUTEROL SULFATE 1 AMPULE: 2.5; .5 SOLUTION RESPIRATORY (INHALATION) at 16:26

## 2018-01-01 RX ADMIN — OXYCODONE HYDROCHLORIDE AND ACETAMINOPHEN 1 TABLET: 5; 325 TABLET ORAL at 12:08

## 2018-01-01 RX ADMIN — HEPARIN 100 UNITS: 100 SYRINGE at 00:05

## 2018-01-01 RX ADMIN — OXYCODONE HYDROCHLORIDE AND ACETAMINOPHEN 1 TABLET: 5; 325 TABLET ORAL at 03:06

## 2018-01-01 RX ADMIN — HEPARIN 100 UNITS: 100 SYRINGE at 12:41

## 2018-01-01 RX ADMIN — OXYCODONE HYDROCHLORIDE AND ACETAMINOPHEN 1 TABLET: 5; 325 TABLET ORAL at 01:25

## 2018-01-01 RX ADMIN — STANDARDIZED SENNA CONCENTRATE 17.2 MG: 8.6 TABLET ORAL at 21:15

## 2018-01-01 RX ADMIN — IPRATROPIUM BROMIDE AND ALBUTEROL SULFATE 1 AMPULE: 2.5; .5 SOLUTION RESPIRATORY (INHALATION) at 11:49

## 2018-01-01 RX ADMIN — OXYCODONE HYDROCHLORIDE AND ACETAMINOPHEN 1 TABLET: 5; 325 TABLET ORAL at 13:01

## 2018-01-01 RX ADMIN — OXYCODONE HYDROCHLORIDE AND ACETAMINOPHEN 1 TABLET: 5; 325 TABLET ORAL at 23:44

## 2018-01-01 RX ADMIN — OXYCODONE HYDROCHLORIDE AND ACETAMINOPHEN 1 TABLET: 5; 325 TABLET ORAL at 04:52

## 2018-01-01 RX ADMIN — GADOBENATE DIMEGLUMINE 15 ML: 529 INJECTION, SOLUTION INTRAVENOUS at 17:45

## 2018-01-01 RX ADMIN — IPRATROPIUM BROMIDE AND ALBUTEROL SULFATE 1 AMPULE: 2.5; .5 SOLUTION RESPIRATORY (INHALATION) at 18:08

## 2018-01-01 RX ADMIN — OXYCODONE HYDROCHLORIDE AND ACETAMINOPHEN 1 TABLET: 5; 325 TABLET ORAL at 18:18

## 2018-01-01 RX ADMIN — DEXAMETHASONE SODIUM PHOSPHATE 4 MG: 4 INJECTION, SOLUTION INTRAMUSCULAR; INTRAVENOUS at 06:33

## 2018-01-01 RX ADMIN — FENTANYL CITRATE 25 MCG: 50 INJECTION, SOLUTION INTRAMUSCULAR; INTRAVENOUS at 21:30

## 2018-01-01 RX ADMIN — OXYCODONE HYDROCHLORIDE AND ACETAMINOPHEN 1 TABLET: 5; 325 TABLET ORAL at 15:06

## 2018-01-01 RX ADMIN — PANTOPRAZOLE SODIUM 40 MG: 40 TABLET, DELAYED RELEASE ORAL at 06:33

## 2018-01-01 RX ADMIN — VITAMIN C 1 TABLET: TAB at 09:16

## 2018-01-01 RX ADMIN — MULTIPLE VITAMINS W/ MINERALS TAB 1 TABLET: TAB at 09:17

## 2018-01-01 RX ADMIN — ENOXAPARIN SODIUM 40 MG: 40 INJECTION SUBCUTANEOUS at 09:16

## 2018-01-01 RX ADMIN — Medication 2000 UNITS: at 09:39

## 2018-01-01 RX ADMIN — IPRATROPIUM BROMIDE AND ALBUTEROL SULFATE 1 AMPULE: 2.5; .5 SOLUTION RESPIRATORY (INHALATION) at 12:56

## 2018-01-01 RX ADMIN — FUROSEMIDE 20 MG: 10 INJECTION, SOLUTION INTRAMUSCULAR; INTRAVENOUS at 13:58

## 2018-01-01 RX ADMIN — Medication 10 ML: at 06:16

## 2018-01-01 RX ADMIN — METHYLPREDNISOLONE 4 MG: 4 TABLET ORAL at 07:07

## 2018-01-01 RX ADMIN — IOPAMIDOL 80 ML: 755 INJECTION, SOLUTION INTRAVENOUS at 18:28

## 2018-01-01 RX ADMIN — Medication 10 ML: at 09:13

## 2018-01-01 RX ADMIN — WATER 1 G: 1 INJECTION INTRAMUSCULAR; INTRAVENOUS; SUBCUTANEOUS at 20:12

## 2018-01-01 RX ADMIN — OXYCODONE HYDROCHLORIDE AND ACETAMINOPHEN 1 TABLET: 5; 325 TABLET ORAL at 10:05

## 2018-01-01 RX ADMIN — Medication 2000 UNITS: at 08:37

## 2018-01-01 RX ADMIN — Medication 10 ML: at 20:18

## 2018-01-01 RX ADMIN — HEPARIN 100 UNITS: 100 SYRINGE at 16:41

## 2018-01-01 RX ADMIN — OXYCODONE HYDROCHLORIDE AND ACETAMINOPHEN 1 TABLET: 5; 325 TABLET ORAL at 18:35

## 2018-01-01 RX ADMIN — LACTULOSE 20 G: 20 SOLUTION ORAL at 21:19

## 2018-01-01 RX ADMIN — IPRATROPIUM BROMIDE AND ALBUTEROL SULFATE 1 AMPULE: 2.5; .5 SOLUTION RESPIRATORY (INHALATION) at 08:05

## 2018-01-01 RX ADMIN — POLYETHYLENE GLYCOL 3350 17 G: 17 POWDER, FOR SOLUTION ORAL at 09:44

## 2018-01-01 RX ADMIN — Medication 10 ML: at 21:18

## 2018-01-01 RX ADMIN — DEXAMETHASONE SODIUM PHOSPHATE 4 MG: 4 INJECTION, SOLUTION INTRAMUSCULAR; INTRAVENOUS at 15:13

## 2018-01-01 RX ADMIN — IPRATROPIUM BROMIDE AND ALBUTEROL SULFATE 1 AMPULE: .5; 3 SOLUTION RESPIRATORY (INHALATION) at 09:45

## 2018-01-01 RX ADMIN — LORAZEPAM 0.5 MG: 2 INJECTION INTRAMUSCULAR; INTRAVENOUS at 15:32

## 2018-01-01 RX ADMIN — PANTOPRAZOLE SODIUM 40 MG: 40 TABLET, DELAYED RELEASE ORAL at 06:44

## 2018-01-01 RX ADMIN — ENOXAPARIN SODIUM 40 MG: 40 INJECTION SUBCUTANEOUS at 22:14

## 2018-01-01 RX ADMIN — STANDARDIZED SENNA CONCENTRATE 17.2 MG: 8.6 TABLET ORAL at 20:24

## 2018-01-01 RX ADMIN — LACTULOSE 10 G: 20 SOLUTION ORAL at 08:36

## 2018-01-01 RX ADMIN — METOCLOPRAMIDE HYDROCHLORIDE 5 MG: 5 INJECTION, SOLUTION INTRAMUSCULAR; INTRAVENOUS at 11:53

## 2018-01-01 RX ADMIN — IPRATROPIUM BROMIDE AND ALBUTEROL SULFATE 1 AMPULE: .5; 3 SOLUTION RESPIRATORY (INHALATION) at 06:01

## 2018-01-01 RX ADMIN — Medication 10 ML: at 08:14

## 2018-01-01 RX ADMIN — HYDROMORPHONE HYDROCHLORIDE 0.5 MG: 2 INJECTION INTRAMUSCULAR; INTRAVENOUS; SUBCUTANEOUS at 15:14

## 2018-01-01 RX ADMIN — OXYCODONE HYDROCHLORIDE AND ACETAMINOPHEN 1 TABLET: 5; 325 TABLET ORAL at 13:14

## 2018-01-01 RX ADMIN — POLYETHYLENE GLYCOL 3350 17 G: 17 POWDER, FOR SOLUTION ORAL at 09:50

## 2018-01-01 RX ADMIN — ENOXAPARIN SODIUM 40 MG: 40 INJECTION SUBCUTANEOUS at 09:13

## 2018-01-01 RX ADMIN — MULTIPLE VITAMINS W/ MINERALS TAB 1 TABLET: TAB at 08:38

## 2018-01-01 RX ADMIN — POLYETHYLENE GLYCOL (3350) 17 G: 17 POWDER, FOR SOLUTION ORAL at 08:25

## 2018-01-01 RX ADMIN — OXYCODONE HYDROCHLORIDE AND ACETAMINOPHEN 1 TABLET: 5; 325 TABLET ORAL at 14:21

## 2018-01-01 RX ADMIN — HYDROMORPHONE HYDROCHLORIDE 0.5 MG: 2 INJECTION INTRAMUSCULAR; INTRAVENOUS; SUBCUTANEOUS at 23:54

## 2018-01-01 RX ADMIN — OXYCODONE HYDROCHLORIDE AND ACETAMINOPHEN 1 TABLET: 5; 325 TABLET ORAL at 00:34

## 2018-01-01 RX ADMIN — POLYETHYLENE GLYCOL 3350 17 G: 17 POWDER, FOR SOLUTION ORAL at 12:39

## 2018-01-01 RX ADMIN — OXYCODONE HYDROCHLORIDE AND ACETAMINOPHEN 1 TABLET: 5; 325 TABLET ORAL at 01:18

## 2018-01-01 RX ADMIN — GABAPENTIN 600 MG: 300 CAPSULE ORAL at 00:36

## 2018-01-01 RX ADMIN — Medication 10 ML: at 01:25

## 2018-01-01 RX ADMIN — LACTULOSE 20 G: 20 SOLUTION ORAL at 08:36

## 2018-01-01 RX ADMIN — ALBUMIN (HUMAN) 25 G: 0.25 INJECTION, SOLUTION INTRAVENOUS at 11:37

## 2018-01-01 RX ADMIN — POLYETHYLENE GLYCOL 3350 17 G: 17 POWDER, FOR SOLUTION ORAL at 08:33

## 2018-01-01 RX ADMIN — FUROSEMIDE 20 MG: 10 INJECTION INTRAMUSCULAR; INTRAVENOUS at 13:11

## 2018-01-01 RX ADMIN — Medication 10 ML: at 09:04

## 2018-01-01 RX ADMIN — IPRATROPIUM BROMIDE AND ALBUTEROL SULFATE 1 AMPULE: .5; 3 SOLUTION RESPIRATORY (INHALATION) at 06:21

## 2018-01-01 RX ADMIN — IPRATROPIUM BROMIDE AND ALBUTEROL SULFATE 1 AMPULE: 2.5; .5 SOLUTION RESPIRATORY (INHALATION) at 16:00

## 2018-01-01 RX ADMIN — POLYETHYLENE GLYCOL 3350 17 G: 17 POWDER, FOR SOLUTION ORAL at 08:43

## 2018-01-01 RX ADMIN — STANDARDIZED SENNA CONCENTRATE 17.2 MG: 8.6 TABLET ORAL at 20:50

## 2018-01-01 RX ADMIN — OXYCODONE HYDROCHLORIDE AND ACETAMINOPHEN 1 TABLET: 5; 325 TABLET ORAL at 22:56

## 2018-01-01 RX ADMIN — Medication 10 ML: at 01:19

## 2018-01-01 RX ADMIN — POLYETHYLENE GLYCOL 3350 17 G: 17 POWDER, FOR SOLUTION ORAL at 08:44

## 2018-01-01 RX ADMIN — Medication 3 MG: at 21:33

## 2018-01-01 RX ADMIN — MORPHINE SULFATE 1 MG/HR: 10 INJECTION INTRAVENOUS at 18:36

## 2018-01-01 RX ADMIN — DEXAMETHASONE SODIUM PHOSPHATE 10 MG: 10 INJECTION, SOLUTION INTRAMUSCULAR; INTRAVENOUS at 19:25

## 2018-01-01 RX ADMIN — OXYCODONE HYDROCHLORIDE AND ACETAMINOPHEN 1 TABLET: 5; 325 TABLET ORAL at 21:36

## 2018-01-01 RX ADMIN — IPRATROPIUM BROMIDE AND ALBUTEROL SULFATE 1 AMPULE: .5; 3 SOLUTION RESPIRATORY (INHALATION) at 13:34

## 2018-01-01 RX ADMIN — ENOXAPARIN SODIUM 40 MG: 40 INJECTION SUBCUTANEOUS at 09:07

## 2018-01-01 RX ADMIN — WATER 1 G: 1 INJECTION INTRAMUSCULAR; INTRAVENOUS; SUBCUTANEOUS at 18:13

## 2018-01-01 RX ADMIN — IPRATROPIUM BROMIDE AND ALBUTEROL SULFATE 1 AMPULE: 2.5; .5 SOLUTION RESPIRATORY (INHALATION) at 15:59

## 2018-01-01 RX ADMIN — OXYCODONE HYDROCHLORIDE AND ACETAMINOPHEN 1 TABLET: 5; 325 TABLET ORAL at 09:39

## 2018-01-01 RX ADMIN — MORPHINE SULFATE 10 MG: 100 SOLUTION ORAL at 16:57

## 2018-01-01 RX ADMIN — LACTULOSE 10 G: 20 SOLUTION ORAL at 20:57

## 2018-01-01 RX ADMIN — ENOXAPARIN SODIUM 40 MG: 40 INJECTION SUBCUTANEOUS at 08:36

## 2018-01-01 RX ADMIN — ENOXAPARIN SODIUM 40 MG: 40 INJECTION SUBCUTANEOUS at 08:56

## 2018-01-01 RX ADMIN — Medication 10 ML: at 21:07

## 2018-01-01 RX ADMIN — MORPHINE SULFATE 10 MG: 100 SOLUTION ORAL at 13:46

## 2018-01-01 RX ADMIN — OXYCODONE HYDROCHLORIDE AND ACETAMINOPHEN 1 TABLET: 5; 325 TABLET ORAL at 14:20

## 2018-01-01 RX ADMIN — LACTULOSE 10 G: 20 SOLUTION ORAL at 08:31

## 2018-01-01 RX ADMIN — ALBUMIN (HUMAN) 25 G: 12.5 SOLUTION INTRAVENOUS at 09:10

## 2018-01-01 RX ADMIN — TAZOBACTAM SODIUM AND PIPERACILLIN SODIUM 3.38 G: 375; 3 INJECTION, SOLUTION INTRAVENOUS at 13:45

## 2018-01-01 RX ADMIN — ENOXAPARIN SODIUM 40 MG: 40 INJECTION SUBCUTANEOUS at 08:18

## 2018-01-01 RX ADMIN — OXYCODONE HYDROCHLORIDE AND ACETAMINOPHEN 1 TABLET: 5; 325 TABLET ORAL at 01:57

## 2018-01-01 RX ADMIN — IPRATROPIUM BROMIDE AND ALBUTEROL SULFATE 1 AMPULE: 2.5; .5 SOLUTION RESPIRATORY (INHALATION) at 15:50

## 2018-01-01 RX ADMIN — OXYCODONE HYDROCHLORIDE AND ACETAMINOPHEN 1 TABLET: 5; 325 TABLET ORAL at 12:35

## 2018-01-01 RX ADMIN — METOCLOPRAMIDE 5 MG: 5 TABLET ORAL at 23:01

## 2018-01-01 RX ADMIN — IPRATROPIUM BROMIDE AND ALBUTEROL SULFATE 1 AMPULE: 2.5; .5 SOLUTION RESPIRATORY (INHALATION) at 20:19

## 2018-01-01 RX ADMIN — PHENYLEPHRINE HYDROCHLORIDE 100 MCG: 10 INJECTION INTRAMUSCULAR; INTRAVENOUS; SUBCUTANEOUS at 19:35

## 2018-01-01 RX ADMIN — VITAMIN C 1 TABLET: TAB at 09:14

## 2018-01-01 RX ADMIN — OXYCODONE HYDROCHLORIDE AND ACETAMINOPHEN 1 TABLET: 5; 325 TABLET ORAL at 19:20

## 2018-01-01 RX ADMIN — VITAMIN C 1 TABLET: TAB at 10:15

## 2018-01-01 RX ADMIN — SENNOSIDES AND DOCUSATE SODIUM 2 TABLET: 8.6; 5 TABLET ORAL at 08:18

## 2018-01-01 RX ADMIN — ALBUMIN (HUMAN) 25 G: 0.25 INJECTION, SOLUTION INTRAVENOUS at 09:10

## 2018-01-01 RX ADMIN — ENOXAPARIN SODIUM 40 MG: 40 INJECTION SUBCUTANEOUS at 09:26

## 2018-01-01 RX ADMIN — VITAMIN C 1 TABLET: TAB at 09:38

## 2018-01-01 RX ADMIN — PANTOPRAZOLE SODIUM 40 MG: 40 TABLET, DELAYED RELEASE ORAL at 07:00

## 2018-01-01 RX ADMIN — MORPHINE SULFATE 2 MG: 2 INJECTION, SOLUTION INTRAMUSCULAR; INTRAVENOUS at 23:39

## 2018-01-01 RX ADMIN — MULTIPLE VITAMINS W/ MINERALS TAB 1 TABLET: TAB at 08:12

## 2018-01-01 RX ADMIN — HYDROMORPHONE HYDROCHLORIDE 0.5 MG: 2 INJECTION INTRAMUSCULAR; INTRAVENOUS; SUBCUTANEOUS at 00:36

## 2018-01-01 RX ADMIN — IPRATROPIUM BROMIDE AND ALBUTEROL SULFATE 1 AMPULE: 2.5; .5 SOLUTION RESPIRATORY (INHALATION) at 18:10

## 2018-01-01 RX ADMIN — IPRATROPIUM BROMIDE AND ALBUTEROL SULFATE 1 AMPULE: 2.5; .5 SOLUTION RESPIRATORY (INHALATION) at 19:47

## 2018-01-01 RX ADMIN — OXYCODONE HYDROCHLORIDE AND ACETAMINOPHEN 1 TABLET: 5; 325 TABLET ORAL at 04:01

## 2018-01-01 RX ADMIN — OXYCODONE HYDROCHLORIDE AND ACETAMINOPHEN 1 TABLET: 5; 325 TABLET ORAL at 13:02

## 2018-01-01 RX ADMIN — DEXAMETHASONE SODIUM PHOSPHATE 4 MG: 4 INJECTION, SOLUTION INTRAMUSCULAR; INTRAVENOUS at 13:31

## 2018-01-01 RX ADMIN — LACTULOSE 20 G: 20 SOLUTION ORAL at 21:15

## 2018-01-01 RX ADMIN — VANCOMYCIN HYDROCHLORIDE 1000 MG: 1 INJECTION, POWDER, LYOPHILIZED, FOR SOLUTION INTRAVENOUS at 15:48

## 2018-01-01 RX ADMIN — OXYCODONE HYDROCHLORIDE AND ACETAMINOPHEN 1 TABLET: 5; 325 TABLET ORAL at 02:23

## 2018-01-01 RX ADMIN — Medication 10 ML: at 06:32

## 2018-01-01 RX ADMIN — WATER 1 G: 1 INJECTION INTRAMUSCULAR; INTRAVENOUS; SUBCUTANEOUS at 19:25

## 2018-01-01 RX ADMIN — MAGNESIUM HYDROXIDE 30 ML: 400 SUSPENSION ORAL at 09:30

## 2018-01-01 RX ADMIN — OXYCODONE HYDROCHLORIDE AND ACETAMINOPHEN 1 TABLET: 5; 325 TABLET ORAL at 09:44

## 2018-01-01 RX ADMIN — POLYETHYLENE GLYCOL 3350 17 G: 17 POWDER, FOR SOLUTION ORAL at 09:06

## 2018-01-01 RX ADMIN — Medication 2000 UNITS: at 08:27

## 2018-01-01 RX ADMIN — VITAMIN C 1 TABLET: TAB at 09:07

## 2018-01-01 RX ADMIN — IPRATROPIUM BROMIDE AND ALBUTEROL SULFATE 1 AMPULE: .5; 3 SOLUTION RESPIRATORY (INHALATION) at 13:33

## 2018-01-01 RX ADMIN — IPRATROPIUM BROMIDE AND ALBUTEROL SULFATE 1 AMPULE: 2.5; .5 SOLUTION RESPIRATORY (INHALATION) at 22:05

## 2018-01-01 RX ADMIN — Medication 10 ML: at 09:07

## 2018-01-01 RX ADMIN — IPRATROPIUM BROMIDE AND ALBUTEROL SULFATE 1 AMPULE: .5; 3 SOLUTION RESPIRATORY (INHALATION) at 08:57

## 2018-01-01 RX ADMIN — STANDARDIZED SENNA CONCENTRATE 17.2 MG: 8.6 TABLET ORAL at 23:57

## 2018-01-01 RX ADMIN — OXYCODONE HYDROCHLORIDE AND ACETAMINOPHEN 1 TABLET: 5; 325 TABLET ORAL at 18:38

## 2018-01-01 RX ADMIN — IPRATROPIUM BROMIDE AND ALBUTEROL SULFATE 1 AMPULE: 2.5; .5 SOLUTION RESPIRATORY (INHALATION) at 09:24

## 2018-01-01 RX ADMIN — IPRATROPIUM BROMIDE AND ALBUTEROL SULFATE 1 AMPULE: .5; 3 SOLUTION RESPIRATORY (INHALATION) at 05:30

## 2018-01-01 RX ADMIN — CEFAZOLIN SODIUM 1 G: 1 SOLUTION INTRAVENOUS at 19:35

## 2018-01-01 RX ADMIN — OXYCODONE HYDROCHLORIDE AND ACETAMINOPHEN 1 TABLET: 5; 325 TABLET ORAL at 07:51

## 2018-01-01 RX ADMIN — MULTIPLE VITAMINS W/ MINERALS TAB 1 TABLET: TAB at 08:21

## 2018-01-01 RX ADMIN — POLYETHYLENE GLYCOL 3350 17 G: 17 POWDER, FOR SOLUTION ORAL at 09:29

## 2018-01-01 RX ADMIN — FLUTICASONE PROPIONATE 1 SPRAY: 50 SPRAY, METERED NASAL at 11:41

## 2018-01-01 RX ADMIN — SODIUM CHLORIDE 750 ML: 9 INJECTION, SOLUTION INTRAVENOUS at 16:41

## 2018-01-01 RX ADMIN — PHENYLEPHRINE HYDROCHLORIDE 100 MCG: 10 INJECTION INTRAMUSCULAR; INTRAVENOUS; SUBCUTANEOUS at 20:00

## 2018-01-01 RX ADMIN — OXYCODONE HYDROCHLORIDE AND ACETAMINOPHEN 1 TABLET: 5; 325 TABLET ORAL at 13:49

## 2018-01-01 RX ADMIN — METHYLPREDNISOLONE 4 MG: 4 TABLET ORAL at 16:56

## 2018-01-01 RX ADMIN — AZITHROMYCIN MONOHYDRATE 500 MG: 500 INJECTION, POWDER, LYOPHILIZED, FOR SOLUTION INTRAVENOUS at 00:35

## 2018-01-01 RX ADMIN — OXYCODONE HYDROCHLORIDE AND ACETAMINOPHEN 1 TABLET: 5; 325 TABLET ORAL at 01:04

## 2018-01-01 RX ADMIN — OXYCODONE HYDROCHLORIDE AND ACETAMINOPHEN 1 TABLET: 5; 325 TABLET ORAL at 16:27

## 2018-01-01 RX ADMIN — PANTOPRAZOLE SODIUM 40 MG: 40 TABLET, DELAYED RELEASE ORAL at 06:16

## 2018-01-01 RX ADMIN — VITAMIN C 1 TABLET: TAB at 10:01

## 2018-01-01 RX ADMIN — METHYLPREDNISOLONE 4 MG: 4 TABLET ORAL at 14:02

## 2018-01-01 RX ADMIN — DEXAMETHASONE SODIUM PHOSPHATE 4 MG: 4 INJECTION, SOLUTION INTRAMUSCULAR; INTRAVENOUS at 19:04

## 2018-01-01 RX ADMIN — Medication 10 ML: at 09:29

## 2018-01-01 RX ADMIN — OXYCODONE HYDROCHLORIDE AND ACETAMINOPHEN 1 TABLET: 5; 325 TABLET ORAL at 00:38

## 2018-01-01 RX ADMIN — Medication 2000 UNITS: at 08:55

## 2018-01-01 RX ADMIN — LABETALOL HYDROCHLORIDE 5 MG: 5 INJECTION INTRAVENOUS at 21:15

## 2018-01-01 RX ADMIN — LACTULOSE 20 G: 20 SOLUTION ORAL at 22:15

## 2018-01-01 RX ADMIN — IPRATROPIUM BROMIDE AND ALBUTEROL SULFATE 1 AMPULE: .5; 3 SOLUTION RESPIRATORY (INHALATION) at 09:21

## 2018-01-01 RX ADMIN — IPRATROPIUM BROMIDE AND ALBUTEROL SULFATE 1 AMPULE: .5; 3 SOLUTION RESPIRATORY (INHALATION) at 16:26

## 2018-01-01 RX ADMIN — OXYCODONE HYDROCHLORIDE AND ACETAMINOPHEN 1 TABLET: 5; 325 TABLET ORAL at 21:14

## 2018-01-01 RX ADMIN — Medication 10 ML: at 00:13

## 2018-01-01 RX ADMIN — OXYCODONE HYDROCHLORIDE AND ACETAMINOPHEN 1 TABLET: 5; 325 TABLET ORAL at 21:55

## 2018-01-01 RX ADMIN — METHYLPREDNISOLONE 4 MG: 4 TABLET ORAL at 00:25

## 2018-01-01 RX ADMIN — FLUTICASONE PROPIONATE 1 SPRAY: 50 SPRAY, METERED NASAL at 09:14

## 2018-01-01 RX ADMIN — VITAMIN C 1 TABLET: TAB at 08:21

## 2018-01-01 RX ADMIN — IPRATROPIUM BROMIDE AND ALBUTEROL SULFATE 1 AMPULE: 2.5; .5 SOLUTION RESPIRATORY (INHALATION) at 21:51

## 2018-01-01 RX ADMIN — WATER 1 G: 1 INJECTION INTRAMUSCULAR; INTRAVENOUS; SUBCUTANEOUS at 18:17

## 2018-01-01 RX ADMIN — ENOXAPARIN SODIUM 40 MG: 40 INJECTION SUBCUTANEOUS at 08:28

## 2018-01-01 RX ADMIN — OXYCODONE HYDROCHLORIDE AND ACETAMINOPHEN 1 TABLET: 5; 325 TABLET ORAL at 22:07

## 2018-01-01 RX ADMIN — OXYCODONE HYDROCHLORIDE AND ACETAMINOPHEN 1 TABLET: 5; 325 TABLET ORAL at 03:49

## 2018-01-01 RX ADMIN — STANDARDIZED SENNA CONCENTRATE 17.2 MG: 8.6 TABLET ORAL at 19:46

## 2018-01-01 RX ADMIN — MULTIPLE VITAMINS W/ MINERALS TAB 1 TABLET: TAB at 10:01

## 2018-01-01 RX ADMIN — MORPHINE SULFATE 10 MG: 100 SOLUTION ORAL at 18:04

## 2018-01-01 RX ADMIN — ENOXAPARIN SODIUM 40 MG: 40 INJECTION SUBCUTANEOUS at 08:51

## 2018-01-01 RX ADMIN — LACTULOSE 20 G: 20 SOLUTION ORAL at 13:54

## 2018-01-01 RX ADMIN — OXYCODONE HYDROCHLORIDE AND ACETAMINOPHEN 1 TABLET: 5; 325 TABLET ORAL at 08:55

## 2018-01-01 RX ADMIN — Medication 2000 UNITS: at 09:16

## 2018-01-01 RX ADMIN — MORPHINE SULFATE 2 MG: 2 INJECTION, SOLUTION INTRAMUSCULAR; INTRAVENOUS at 05:56

## 2018-01-01 RX ADMIN — POLYETHYLENE GLYCOL (3350) 17 G: 17 POWDER, FOR SOLUTION ORAL at 15:03

## 2018-01-01 RX ADMIN — MORPHINE SULFATE 10 MG: 100 SOLUTION ORAL at 13:25

## 2018-01-01 RX ADMIN — IPRATROPIUM BROMIDE AND ALBUTEROL SULFATE 1 AMPULE: 2.5; .5 SOLUTION RESPIRATORY (INHALATION) at 07:00

## 2018-01-01 RX ADMIN — VITAMIN C 1 TABLET: TAB at 09:04

## 2018-01-01 RX ADMIN — MULTIPLE VITAMINS W/ MINERALS TAB 1 TABLET: TAB at 08:51

## 2018-01-01 RX ADMIN — Medication 2000 UNITS: at 10:01

## 2018-01-01 RX ADMIN — OXYCODONE HYDROCHLORIDE AND ACETAMINOPHEN 1 TABLET: 10; 325 TABLET ORAL at 08:19

## 2018-01-01 RX ADMIN — IPRATROPIUM BROMIDE AND ALBUTEROL SULFATE 1 AMPULE: 2.5; .5 SOLUTION RESPIRATORY (INHALATION) at 19:35

## 2018-01-01 RX ADMIN — VITAMIN C 1 TABLET: TAB at 08:54

## 2018-01-01 RX ADMIN — WATER 1 G: 1 INJECTION, SOLUTION INTRAVENOUS at 01:13

## 2018-01-01 RX ADMIN — POTASSIUM CHLORIDE 10 MEQ: 7.46 INJECTION, SOLUTION INTRAVENOUS at 09:13

## 2018-01-01 RX ADMIN — MULTIPLE VITAMINS W/ MINERALS TAB 1 TABLET: TAB at 08:31

## 2018-01-01 RX ADMIN — OXYCODONE HYDROCHLORIDE AND ACETAMINOPHEN 1 TABLET: 5; 325 TABLET ORAL at 06:04

## 2018-01-01 RX ADMIN — MULTIPLE VITAMINS W/ MINERALS TAB 1 TABLET: TAB at 09:20

## 2018-01-01 RX ADMIN — PANTOPRAZOLE SODIUM 40 MG: 40 TABLET, DELAYED RELEASE ORAL at 05:22

## 2018-01-01 RX ADMIN — LACTULOSE 10 G: 20 SOLUTION ORAL at 09:21

## 2018-01-01 RX ADMIN — STANDARDIZED SENNA CONCENTRATE 17.2 MG: 8.6 TABLET ORAL at 10:15

## 2018-01-01 RX ADMIN — IPRATROPIUM BROMIDE AND ALBUTEROL SULFATE 1 AMPULE: .5; 3 SOLUTION RESPIRATORY (INHALATION) at 16:28

## 2018-01-01 RX ADMIN — OXYCODONE HYDROCHLORIDE AND ACETAMINOPHEN 1 TABLET: 5; 325 TABLET ORAL at 11:36

## 2018-01-01 RX ADMIN — IPRATROPIUM BROMIDE AND ALBUTEROL SULFATE 1 AMPULE: 2.5; .5 SOLUTION RESPIRATORY (INHALATION) at 11:35

## 2018-01-01 RX ADMIN — OXYCODONE HYDROCHLORIDE AND ACETAMINOPHEN 1 TABLET: 5; 325 TABLET ORAL at 15:58

## 2018-01-01 RX ADMIN — Medication 10 ML: at 07:15

## 2018-01-01 RX ADMIN — Medication 2000 UNITS: at 09:26

## 2018-01-01 RX ADMIN — HYDROCODONE BITARTRATE AND ACETAMINOPHEN 2 TABLET: 5; 325 TABLET ORAL at 07:14

## 2018-01-01 RX ADMIN — MORPHINE SULFATE 2 MG: 2 INJECTION, SOLUTION INTRAMUSCULAR; INTRAVENOUS at 15:05

## 2018-01-01 RX ADMIN — POLYETHYLENE GLYCOL 3350 17 G: 17 POWDER, FOR SOLUTION ORAL at 09:48

## 2018-01-01 RX ADMIN — IPRATROPIUM BROMIDE AND ALBUTEROL SULFATE 1 AMPULE: 2.5; .5 SOLUTION RESPIRATORY (INHALATION) at 08:08

## 2018-01-01 RX ADMIN — OXYCODONE HYDROCHLORIDE AND ACETAMINOPHEN 1 TABLET: 5; 325 TABLET ORAL at 02:24

## 2018-01-01 RX ADMIN — ENOXAPARIN SODIUM 40 MG: 40 INJECTION SUBCUTANEOUS at 09:59

## 2018-01-01 RX ADMIN — OXYCODONE HYDROCHLORIDE AND ACETAMINOPHEN 1 TABLET: 5; 325 TABLET ORAL at 09:26

## 2018-01-01 RX ADMIN — MULTIPLE VITAMINS W/ MINERALS TAB 1 TABLET: TAB at 09:21

## 2018-01-01 RX ADMIN — SENNOSIDES 8.6 MG: 8.6 TABLET, FILM COATED ORAL at 21:32

## 2018-01-01 RX ADMIN — IPRATROPIUM BROMIDE AND ALBUTEROL SULFATE 1 AMPULE: .5; 3 SOLUTION RESPIRATORY (INHALATION) at 13:50

## 2018-01-01 RX ADMIN — IPRATROPIUM BROMIDE AND ALBUTEROL SULFATE 1 AMPULE: .5; 3 SOLUTION RESPIRATORY (INHALATION) at 13:08

## 2018-01-01 RX ADMIN — ENOXAPARIN SODIUM 40 MG: 40 INJECTION SUBCUTANEOUS at 08:43

## 2018-01-01 RX ADMIN — Medication 2000 UNITS: at 09:30

## 2018-01-01 RX ADMIN — FENTANYL CITRATE 50 MCG: 50 INJECTION INTRAMUSCULAR; INTRAVENOUS at 22:15

## 2018-01-01 RX ADMIN — Medication 10 ML: at 03:00

## 2018-01-01 RX ADMIN — STANDARDIZED SENNA CONCENTRATE 17.2 MG: 8.6 TABLET ORAL at 12:06

## 2018-01-01 RX ADMIN — OXYCODONE HYDROCHLORIDE AND ACETAMINOPHEN 1 TABLET: 5; 325 TABLET ORAL at 03:03

## 2018-01-01 RX ADMIN — AZITHROMYCIN MONOHYDRATE 500 MG: 500 INJECTION, POWDER, LYOPHILIZED, FOR SOLUTION INTRAVENOUS at 23:53

## 2018-01-01 RX ADMIN — OXYCODONE HYDROCHLORIDE AND ACETAMINOPHEN 1 TABLET: 5; 325 TABLET ORAL at 23:01

## 2018-01-01 RX ADMIN — MULTIPLE VITAMINS W/ MINERALS TAB 1 TABLET: TAB at 08:43

## 2018-01-01 RX ADMIN — ENOXAPARIN SODIUM 40 MG: 40 INJECTION SUBCUTANEOUS at 08:50

## 2018-01-01 RX ADMIN — Medication 10 ML: at 08:43

## 2018-01-01 RX ADMIN — MORPHINE SULFATE 2 MG: 2 INJECTION, SOLUTION INTRAMUSCULAR; INTRAVENOUS at 02:02

## 2018-01-01 RX ADMIN — ENOXAPARIN SODIUM 40 MG: 40 INJECTION SUBCUTANEOUS at 09:48

## 2018-01-01 RX ADMIN — Medication 10 ML: at 21:56

## 2018-01-01 RX ADMIN — ALBUMIN (HUMAN) 25 G: 0.25 INJECTION, SOLUTION INTRAVENOUS at 05:32

## 2018-01-01 RX ADMIN — MULTIPLE VITAMINS W/ MINERALS TAB 1 TABLET: TAB at 08:17

## 2018-01-01 RX ADMIN — OXYCODONE HYDROCHLORIDE AND ACETAMINOPHEN 1 TABLET: 5; 325 TABLET ORAL at 22:13

## 2018-01-01 RX ADMIN — IPRATROPIUM BROMIDE AND ALBUTEROL SULFATE 1 AMPULE: 2.5; .5 SOLUTION RESPIRATORY (INHALATION) at 07:25

## 2018-01-01 RX ADMIN — FUROSEMIDE 20 MG: 10 INJECTION, SOLUTION INTRAMUSCULAR; INTRAVENOUS at 00:12

## 2018-01-01 RX ADMIN — LACTULOSE 20 G: 20 SOLUTION ORAL at 09:38

## 2018-01-01 RX ADMIN — OXYCODONE HYDROCHLORIDE AND ACETAMINOPHEN 1 TABLET: 5; 325 TABLET ORAL at 05:44

## 2018-01-01 RX ADMIN — VITAMIN C 1 TABLET: TAB at 08:32

## 2018-01-01 RX ADMIN — OXYCODONE HYDROCHLORIDE AND ACETAMINOPHEN 1 TABLET: 5; 325 TABLET ORAL at 16:50

## 2018-01-01 RX ADMIN — MULTIPLE VITAMINS W/ MINERALS TAB 1 TABLET: TAB at 11:41

## 2018-01-01 RX ADMIN — NOREPINEPHRINE BITARTRATE 5 MCG/MIN: 1 INJECTION INTRAVENOUS at 14:54

## 2018-01-01 RX ADMIN — OXYCODONE HYDROCHLORIDE AND ACETAMINOPHEN 1 TABLET: 5; 325 TABLET ORAL at 22:00

## 2018-01-01 RX ADMIN — IPRATROPIUM BROMIDE AND ALBUTEROL SULFATE 1 AMPULE: 2.5; .5 SOLUTION RESPIRATORY (INHALATION) at 16:20

## 2018-01-01 RX ADMIN — VITAMIN C 1 TABLET: TAB at 08:42

## 2018-01-01 RX ADMIN — IPRATROPIUM BROMIDE AND ALBUTEROL SULFATE 1 AMPULE: 2.5; .5 SOLUTION RESPIRATORY (INHALATION) at 20:20

## 2018-01-01 RX ADMIN — IPRATROPIUM BROMIDE AND ALBUTEROL SULFATE 1 AMPULE: 2.5; .5 SOLUTION RESPIRATORY (INHALATION) at 11:45

## 2018-01-01 RX ADMIN — OXYCODONE HYDROCHLORIDE AND ACETAMINOPHEN 1 TABLET: 5; 325 TABLET ORAL at 23:36

## 2018-01-01 RX ADMIN — MORPHINE SULFATE 10 MG: 100 SOLUTION ORAL at 08:57

## 2018-01-01 RX ADMIN — HYDROCODONE BITARTRATE AND ACETAMINOPHEN 2 TABLET: 5; 325 TABLET ORAL at 13:37

## 2018-01-01 RX ADMIN — MULTIPLE VITAMINS W/ MINERALS TAB 1 TABLET: TAB at 08:44

## 2018-01-01 RX ADMIN — IPRATROPIUM BROMIDE AND ALBUTEROL SULFATE 1 AMPULE: 2.5; .5 SOLUTION RESPIRATORY (INHALATION) at 16:23

## 2018-01-01 RX ADMIN — ENOXAPARIN SODIUM 40 MG: 40 INJECTION SUBCUTANEOUS at 08:48

## 2018-01-01 RX ADMIN — OXYCODONE HYDROCHLORIDE AND ACETAMINOPHEN 1 TABLET: 5; 325 TABLET ORAL at 08:13

## 2018-01-01 RX ADMIN — METHYLPREDNISOLONE 4 MG: 4 TABLET ORAL at 22:22

## 2018-01-01 RX ADMIN — IPRATROPIUM BROMIDE AND ALBUTEROL SULFATE 1 AMPULE: 2.5; .5 SOLUTION RESPIRATORY (INHALATION) at 20:30

## 2018-01-01 RX ADMIN — MULTIPLE VITAMINS W/ MINERALS TAB 1 TABLET: TAB at 09:16

## 2018-01-01 RX ADMIN — PANTOPRAZOLE SODIUM 40 MG: 40 TABLET, DELAYED RELEASE ORAL at 06:37

## 2018-01-01 RX ADMIN — GABAPENTIN 300 MG: 300 CAPSULE ORAL at 08:48

## 2018-01-01 RX ADMIN — POTASSIUM CHLORIDE, DEXTROSE MONOHYDRATE AND SODIUM CHLORIDE: 300; 5; 450 INJECTION, SOLUTION INTRAVENOUS at 11:37

## 2018-01-01 RX ADMIN — Medication 10 ML: at 17:55

## 2018-01-01 RX ADMIN — GABAPENTIN 300 MG: 300 CAPSULE ORAL at 09:20

## 2018-01-01 RX ADMIN — OXYCODONE HYDROCHLORIDE AND ACETAMINOPHEN 1 TABLET: 5; 325 TABLET ORAL at 08:47

## 2018-01-01 RX ADMIN — GABAPENTIN 300 MG: 300 CAPSULE ORAL at 00:13

## 2018-01-01 RX ADMIN — SODIUM CHLORIDE: 9 INJECTION, SOLUTION INTRAVENOUS at 01:20

## 2018-01-01 RX ADMIN — LACTULOSE 20 G: 20 SOLUTION ORAL at 09:45

## 2018-01-01 RX ADMIN — Medication 2000 UNITS: at 08:31

## 2018-01-01 RX ADMIN — Medication 10 ML: at 08:34

## 2018-01-01 RX ADMIN — HYDROMORPHONE HYDROCHLORIDE 0.5 MG: 2 INJECTION INTRAMUSCULAR; INTRAVENOUS; SUBCUTANEOUS at 18:48

## 2018-01-01 RX ADMIN — OXYCODONE HYDROCHLORIDE AND ACETAMINOPHEN 1 TABLET: 5; 325 TABLET ORAL at 02:08

## 2018-01-01 RX ADMIN — SODIUM CHLORIDE: 9 INJECTION, SOLUTION INTRAVENOUS at 11:16

## 2018-01-01 RX ADMIN — OXYCODONE HYDROCHLORIDE AND ACETAMINOPHEN 1 TABLET: 5; 325 TABLET ORAL at 05:22

## 2018-01-01 RX ADMIN — MORPHINE SULFATE 10 MG: 100 SOLUTION ORAL at 09:28

## 2018-01-01 RX ADMIN — MULTIPLE VITAMINS W/ MINERALS TAB 1 TABLET: TAB at 09:30

## 2018-01-01 RX ADMIN — LACTULOSE 20 G: 20 SOLUTION ORAL at 13:36

## 2018-01-01 RX ADMIN — TAZOBACTAM SODIUM AND PIPERACILLIN SODIUM 3.38 G: 375; 3 INJECTION, SOLUTION INTRAVENOUS at 13:50

## 2018-01-01 RX ADMIN — FENTANYL CITRATE 50 MCG: 50 INJECTION INTRAMUSCULAR; INTRAVENOUS at 22:33

## 2018-01-01 RX ADMIN — OXYCODONE HYDROCHLORIDE AND ACETAMINOPHEN 1 TABLET: 5; 325 TABLET ORAL at 20:06

## 2018-01-01 RX ADMIN — LACTULOSE 20 G: 20 SOLUTION ORAL at 21:32

## 2018-01-01 RX ADMIN — IPRATROPIUM BROMIDE AND ALBUTEROL SULFATE 1 AMPULE: 2.5; .5 SOLUTION RESPIRATORY (INHALATION) at 12:35

## 2018-01-01 RX ADMIN — FENTANYL CITRATE 50 MCG: 50 INJECTION, SOLUTION INTRAMUSCULAR; INTRAVENOUS at 20:13

## 2018-01-01 RX ADMIN — POLYETHYLENE GLYCOL 3350 17 G: 17 POWDER, FOR SOLUTION ORAL at 09:20

## 2018-01-01 RX ADMIN — VITAMIN C 1 TABLET: TAB at 08:58

## 2018-01-01 RX ADMIN — WATER 1 G: 1 INJECTION, SOLUTION INTRAVENOUS at 00:35

## 2018-01-01 RX ADMIN — OXYCODONE HYDROCHLORIDE AND ACETAMINOPHEN 1 TABLET: 5; 325 TABLET ORAL at 20:57

## 2018-01-01 RX ADMIN — OXYCODONE HYDROCHLORIDE AND ACETAMINOPHEN 1 TABLET: 5; 325 TABLET ORAL at 20:52

## 2018-01-01 RX ADMIN — HYDROMORPHONE HYDROCHLORIDE 0.5 MG: 2 INJECTION INTRAMUSCULAR; INTRAVENOUS; SUBCUTANEOUS at 07:58

## 2018-01-01 RX ADMIN — OXYCODONE HYDROCHLORIDE AND ACETAMINOPHEN 1 TABLET: 5; 325 TABLET ORAL at 21:31

## 2018-01-01 RX ADMIN — DEXAMETHASONE SODIUM PHOSPHATE 4 MG: 4 INJECTION, SOLUTION INTRAMUSCULAR; INTRAVENOUS at 01:19

## 2018-01-01 RX ADMIN — ENOXAPARIN SODIUM 40 MG: 40 INJECTION SUBCUTANEOUS at 08:13

## 2018-01-01 RX ADMIN — OXYCODONE HYDROCHLORIDE AND ACETAMINOPHEN 1 TABLET: 5; 325 TABLET ORAL at 08:23

## 2018-01-01 RX ADMIN — DEXAMETHASONE SODIUM PHOSPHATE 4 MG: 4 INJECTION, SOLUTION INTRAMUSCULAR; INTRAVENOUS at 01:46

## 2018-01-01 RX ADMIN — OXYCODONE HYDROCHLORIDE AND ACETAMINOPHEN 1 TABLET: 5; 325 TABLET ORAL at 13:05

## 2018-01-01 RX ADMIN — Medication 2000 UNITS: at 09:48

## 2018-01-01 RX ADMIN — HYDROCODONE BITARTRATE AND ACETAMINOPHEN 2 TABLET: 5; 325 TABLET ORAL at 19:45

## 2018-01-01 RX ADMIN — OXYCODONE HYDROCHLORIDE AND ACETAMINOPHEN 1 TABLET: 5; 325 TABLET ORAL at 09:07

## 2018-01-01 RX ADMIN — OXYCODONE HYDROCHLORIDE AND ACETAMINOPHEN 1 TABLET: 5; 325 TABLET ORAL at 01:34

## 2018-01-01 RX ADMIN — MULTIPLE VITAMINS W/ MINERALS TAB 1 TABLET: TAB at 08:47

## 2018-01-01 RX ADMIN — OXYCODONE HYDROCHLORIDE AND ACETAMINOPHEN 1 TABLET: 5; 325 TABLET ORAL at 12:24

## 2018-01-01 RX ADMIN — Medication 10 ML: at 13:31

## 2018-01-01 RX ADMIN — PANTOPRAZOLE SODIUM 40 MG: 40 TABLET, DELAYED RELEASE ORAL at 06:57

## 2018-01-01 RX ADMIN — FUROSEMIDE 80 MG: 10 INJECTION, SOLUTION INTRAMUSCULAR; INTRAVENOUS at 16:26

## 2018-01-01 RX ADMIN — Medication 2000 UNITS: at 09:06

## 2018-01-01 RX ADMIN — PANTOPRAZOLE SODIUM 40 MG: 40 TABLET, DELAYED RELEASE ORAL at 07:01

## 2018-01-01 RX ADMIN — SODIUM CHLORIDE, POTASSIUM CHLORIDE, SODIUM LACTATE AND CALCIUM CHLORIDE: 600; 310; 30; 20 INJECTION, SOLUTION INTRAVENOUS at 19:22

## 2018-01-01 RX ADMIN — POLYETHYLENE GLYCOL 3350 17 G: 17 POWDER, FOR SOLUTION ORAL at 08:27

## 2018-01-01 RX ADMIN — POLYETHYLENE GLYCOL 3350 17 G: 17 POWDER, FOR SOLUTION ORAL at 08:21

## 2018-01-01 RX ADMIN — OXYCODONE HYDROCHLORIDE AND ACETAMINOPHEN 1 TABLET: 5; 325 TABLET ORAL at 08:36

## 2018-01-01 RX ADMIN — DEXAMETHASONE SODIUM PHOSPHATE 4 MG: 4 INJECTION, SOLUTION INTRAMUSCULAR; INTRAVENOUS at 07:15

## 2018-01-01 RX ADMIN — IPRATROPIUM BROMIDE AND ALBUTEROL SULFATE 1 AMPULE: 2.5; .5 SOLUTION RESPIRATORY (INHALATION) at 08:43

## 2018-01-01 RX ADMIN — FUROSEMIDE 20 MG: 10 INJECTION, SOLUTION INTRAMUSCULAR; INTRAVENOUS at 08:56

## 2018-01-01 RX ADMIN — PANTOPRAZOLE SODIUM 40 MG: 40 TABLET, DELAYED RELEASE ORAL at 06:04

## 2018-01-01 RX ADMIN — VITAMIN C 1 TABLET: TAB at 08:27

## 2018-01-01 RX ADMIN — DEXAMETHASONE SODIUM PHOSPHATE 4 MG: 4 INJECTION, SOLUTION INTRAMUSCULAR; INTRAVENOUS at 15:11

## 2018-01-01 RX ADMIN — Medication 10 ML: at 23:54

## 2018-01-01 RX ADMIN — HYDROMORPHONE HYDROCHLORIDE 0.5 MG: 2 INJECTION INTRAMUSCULAR; INTRAVENOUS; SUBCUTANEOUS at 03:32

## 2018-01-01 RX ADMIN — PANTOPRAZOLE SODIUM 40 MG: 40 TABLET, DELAYED RELEASE ORAL at 06:35

## 2018-01-01 RX ADMIN — OXYCODONE HYDROCHLORIDE AND ACETAMINOPHEN 1 TABLET: 5; 325 TABLET ORAL at 17:28

## 2018-01-01 RX ADMIN — Medication 10 ML: at 18:11

## 2018-01-01 RX ADMIN — SENNOSIDES AND DOCUSATE SODIUM 2 TABLET: 8.6; 5 TABLET ORAL at 08:24

## 2018-01-01 RX ADMIN — WATER 1 G: 1 INJECTION INTRAMUSCULAR; INTRAVENOUS; SUBCUTANEOUS at 15:56

## 2018-01-01 RX ADMIN — DEXAMETHASONE SODIUM PHOSPHATE 4 MG: 4 INJECTION, SOLUTION INTRAMUSCULAR; INTRAVENOUS at 14:17

## 2018-01-01 RX ADMIN — MULTIPLE VITAMINS W/ MINERALS TAB 1 TABLET: TAB at 09:19

## 2018-01-01 RX ADMIN — OXYCODONE HYDROCHLORIDE AND ACETAMINOPHEN 1 TABLET: 5; 325 TABLET ORAL at 07:16

## 2018-01-01 RX ADMIN — MORPHINE SULFATE 2 MG: 2 INJECTION, SOLUTION INTRAMUSCULAR; INTRAVENOUS at 01:25

## 2018-01-01 RX ADMIN — Medication 2000 UNITS: at 08:58

## 2018-01-01 RX ADMIN — MULTIPLE VITAMINS W/ MINERALS TAB 1 TABLET: TAB at 08:54

## 2018-01-01 RX ADMIN — OXYCODONE HYDROCHLORIDE AND ACETAMINOPHEN 1 TABLET: 5; 325 TABLET ORAL at 04:05

## 2018-01-01 RX ADMIN — OXYCODONE HYDROCHLORIDE AND ACETAMINOPHEN 1 TABLET: 5; 325 TABLET ORAL at 08:31

## 2018-01-01 RX ADMIN — VITAMIN C 1 TABLET: TAB at 08:44

## 2018-01-01 RX ADMIN — LACTULOSE 10 G: 20 SOLUTION ORAL at 09:47

## 2018-01-01 RX ADMIN — POLYETHYLENE GLYCOL 3350 17 G: 17 POWDER, FOR SOLUTION ORAL at 08:34

## 2018-01-01 RX ADMIN — Medication 10 ML: at 21:06

## 2018-01-01 RX ADMIN — POLYETHYLENE GLYCOL 3350 17 G: 17 POWDER, FOR SOLUTION ORAL at 09:26

## 2018-01-01 RX ADMIN — PANTOPRAZOLE SODIUM 40 MG: 40 TABLET, DELAYED RELEASE ORAL at 06:58

## 2018-01-01 RX ADMIN — STANDARDIZED SENNA CONCENTRATE AND DOCUSATE SODIUM 2 TABLET: 8.6; 5 TABLET, FILM COATED ORAL at 17:30

## 2018-01-01 RX ADMIN — OXYCODONE HYDROCHLORIDE AND ACETAMINOPHEN 1 TABLET: 5; 325 TABLET ORAL at 09:20

## 2018-01-01 RX ADMIN — IPRATROPIUM BROMIDE AND ALBUTEROL SULFATE 1 AMPULE: .5; 3 SOLUTION RESPIRATORY (INHALATION) at 06:30

## 2018-01-01 RX ADMIN — OXYCODONE HYDROCHLORIDE AND ACETAMINOPHEN 1 TABLET: 5; 325 TABLET ORAL at 00:45

## 2018-01-01 RX ADMIN — OXYCODONE HYDROCHLORIDE AND ACETAMINOPHEN 1 TABLET: 5; 325 TABLET ORAL at 06:30

## 2018-01-01 RX ADMIN — POLYETHYLENE GLYCOL 3350 17 G: 17 POWDER, FOR SOLUTION ORAL at 09:17

## 2018-01-01 RX ADMIN — METOCLOPRAMIDE 5 MG: 5 TABLET ORAL at 09:42

## 2018-01-01 RX ADMIN — MORPHINE SULFATE 2 MG: 2 INJECTION, SOLUTION INTRAMUSCULAR; INTRAVENOUS at 09:46

## 2018-01-01 RX ADMIN — PANTOPRAZOLE SODIUM 40 MG: 40 TABLET, DELAYED RELEASE ORAL at 04:52

## 2018-01-01 RX ADMIN — OXYCODONE HYDROCHLORIDE AND ACETAMINOPHEN 1 TABLET: 5; 325 TABLET ORAL at 11:18

## 2018-01-01 RX ADMIN — Medication 2000 UNITS: at 09:27

## 2018-01-01 RX ADMIN — VITAMIN C 1 TABLET: TAB at 08:35

## 2018-01-01 RX ADMIN — OXYCODONE HYDROCHLORIDE AND ACETAMINOPHEN 1 TABLET: 5; 325 TABLET ORAL at 14:10

## 2018-01-01 RX ADMIN — VITAMIN C 1 TABLET: TAB at 12:01

## 2018-01-01 RX ADMIN — OXYCODONE HYDROCHLORIDE AND ACETAMINOPHEN 1 TABLET: 5; 325 TABLET ORAL at 16:21

## 2018-01-01 RX ADMIN — LACTULOSE 10 G: 20 SOLUTION ORAL at 08:27

## 2018-01-01 RX ADMIN — MORPHINE SULFATE 2 MG: 2 INJECTION, SOLUTION INTRAMUSCULAR; INTRAVENOUS at 20:51

## 2018-01-01 RX ADMIN — LACTULOSE 10 G: 20 SOLUTION ORAL at 08:54

## 2018-01-01 RX ADMIN — MORPHINE SULFATE 10 MG: 100 SOLUTION ORAL at 21:45

## 2018-01-01 RX ADMIN — OXYCODONE HYDROCHLORIDE AND ACETAMINOPHEN 1 TABLET: 5; 325 TABLET ORAL at 01:32

## 2018-01-01 RX ADMIN — SODIUM CHLORIDE 1899 ML: 9 INJECTION, SOLUTION INTRAVENOUS at 00:00

## 2018-01-01 RX ADMIN — MULTIPLE VITAMINS W/ MINERALS TAB 1 TABLET: TAB at 08:32

## 2018-01-01 RX ADMIN — OXYCODONE HYDROCHLORIDE AND ACETAMINOPHEN 1 TABLET: 5; 325 TABLET ORAL at 17:06

## 2018-01-01 RX ADMIN — PERFLUTREN 1.65 MG: 6.52 INJECTION, SUSPENSION INTRAVENOUS at 10:11

## 2018-01-01 RX ADMIN — FLUTICASONE PROPIONATE 1 SPRAY: 50 SPRAY, METERED NASAL at 09:15

## 2018-01-01 RX ADMIN — Medication 10 ML: at 09:30

## 2018-01-01 RX ADMIN — WATER 1 G: 1 INJECTION INTRAMUSCULAR; INTRAVENOUS; SUBCUTANEOUS at 17:46

## 2018-01-01 RX ADMIN — CALCIUM CARBONATE-VITAMIN D TAB 500 MG-200 UNIT 1 TABLET: 500-200 TAB at 09:19

## 2018-01-01 RX ADMIN — OXYCODONE HYDROCHLORIDE AND ACETAMINOPHEN 1 TABLET: 5; 325 TABLET ORAL at 16:10

## 2018-01-01 RX ADMIN — LACTULOSE 10 G: 20 SOLUTION ORAL at 09:36

## 2018-01-01 RX ADMIN — FENTANYL CITRATE 50 MCG: 50 INJECTION INTRAMUSCULAR; INTRAVENOUS at 22:05

## 2018-01-01 RX ADMIN — ENOXAPARIN SODIUM 40 MG: 40 INJECTION SUBCUTANEOUS at 08:44

## 2018-01-01 RX ADMIN — IPRATROPIUM BROMIDE AND ALBUTEROL SULFATE 1 AMPULE: 2.5; .5 SOLUTION RESPIRATORY (INHALATION) at 16:11

## 2018-01-01 RX ADMIN — Medication 10 ML: at 10:16

## 2018-01-01 RX ADMIN — METHYLPREDNISOLONE 4 MG: 4 TABLET ORAL at 06:37

## 2018-01-01 RX ADMIN — OXYCODONE HYDROCHLORIDE AND ACETAMINOPHEN 1 TABLET: 5; 325 TABLET ORAL at 12:32

## 2018-01-01 RX ADMIN — MORPHINE SULFATE 2 MG: 2 INJECTION, SOLUTION INTRAMUSCULAR; INTRAVENOUS at 15:56

## 2018-01-01 RX ADMIN — FLUTICASONE PROPIONATE 1 SPRAY: 50 SPRAY, METERED NASAL at 08:42

## 2018-01-01 RX ADMIN — Medication 10 ML: at 08:44

## 2018-01-01 RX ADMIN — MORPHINE SULFATE 10 MG: 100 SOLUTION ORAL at 08:25

## 2018-01-01 RX ADMIN — OXYCODONE HYDROCHLORIDE AND ACETAMINOPHEN 1 TABLET: 5; 325 TABLET ORAL at 08:58

## 2018-01-01 RX ADMIN — OXYCODONE HYDROCHLORIDE AND ACETAMINOPHEN 1 TABLET: 5; 325 TABLET ORAL at 01:21

## 2018-01-01 RX ADMIN — METHYLPREDNISOLONE 4 MG: 4 TABLET ORAL at 23:10

## 2018-01-01 RX ADMIN — Medication 2000 UNITS: at 09:04

## 2018-01-01 RX ADMIN — OXYCODONE HYDROCHLORIDE AND ACETAMINOPHEN 1 TABLET: 5; 325 TABLET ORAL at 03:21

## 2018-01-01 RX ADMIN — VANCOMYCIN HYDROCHLORIDE 1250 MG: 1 INJECTION, POWDER, LYOPHILIZED, FOR SOLUTION INTRAVENOUS at 16:05

## 2018-01-01 RX ADMIN — OXYCODONE HYDROCHLORIDE AND ACETAMINOPHEN 1 TABLET: 5; 325 TABLET ORAL at 06:33

## 2018-01-01 RX ADMIN — HYDROMORPHONE HYDROCHLORIDE 0.5 MG: 2 INJECTION INTRAMUSCULAR; INTRAVENOUS; SUBCUTANEOUS at 10:25

## 2018-01-01 RX ADMIN — STANDARDIZED SENNA CONCENTRATE 17.2 MG: 8.6 TABLET ORAL at 08:33

## 2018-01-01 RX ADMIN — VITAMIN C 1 TABLET: TAB at 08:37

## 2018-01-01 RX ADMIN — PANTOPRAZOLE SODIUM 40 MG: 40 TABLET, DELAYED RELEASE ORAL at 05:28

## 2018-01-01 RX ADMIN — MAGNESIUM HYDROXIDE 30 ML: 400 SUSPENSION ORAL at 09:29

## 2018-01-01 RX ADMIN — MORPHINE SULFATE 10 MG: 100 SOLUTION ORAL at 05:22

## 2018-01-01 RX ADMIN — TAZOBACTAM SODIUM AND PIPERACILLIN SODIUM 3.38 G: 375; 3 INJECTION, SOLUTION INTRAVENOUS at 00:04

## 2018-01-01 RX ADMIN — DEXAMETHASONE SODIUM PHOSPHATE 4 MG: 4 INJECTION, SOLUTION INTRAMUSCULAR; INTRAVENOUS at 06:15

## 2018-01-01 RX ADMIN — OXYCODONE HYDROCHLORIDE AND ACETAMINOPHEN 1 TABLET: 5; 325 TABLET ORAL at 07:19

## 2018-01-01 RX ADMIN — PANTOPRAZOLE SODIUM 40 MG: 40 TABLET, DELAYED RELEASE ORAL at 06:30

## 2018-01-01 RX ADMIN — STANDARDIZED SENNA CONCENTRATE 17.2 MG: 8.6 TABLET ORAL at 09:30

## 2018-01-01 RX ADMIN — Medication 10 ML: at 09:21

## 2018-01-01 RX ADMIN — MULTIPLE VITAMINS W/ MINERALS TAB 1 TABLET: TAB at 10:15

## 2018-01-01 RX ADMIN — Medication 2000 UNITS: at 11:41

## 2018-01-01 RX ADMIN — AZITHROMYCIN MONOHYDRATE 500 MG: 500 INJECTION, POWDER, LYOPHILIZED, FOR SOLUTION INTRAVENOUS at 01:00

## 2018-01-01 RX ADMIN — IPRATROPIUM BROMIDE AND ALBUTEROL SULFATE 1 AMPULE: 2.5; .5 SOLUTION RESPIRATORY (INHALATION) at 17:11

## 2018-01-01 RX ADMIN — PANTOPRAZOLE SODIUM 40 MG: 40 TABLET, DELAYED RELEASE ORAL at 07:19

## 2018-01-01 RX ADMIN — IPRATROPIUM BROMIDE AND ALBUTEROL SULFATE 1 AMPULE: 2.5; .5 SOLUTION RESPIRATORY (INHALATION) at 20:46

## 2018-01-01 RX ADMIN — Medication 2000 UNITS: at 09:17

## 2018-01-01 RX ADMIN — ENOXAPARIN SODIUM 40 MG: 40 INJECTION SUBCUTANEOUS at 12:04

## 2018-01-01 RX ADMIN — POTASSIUM CHLORIDE, DEXTROSE MONOHYDRATE AND SODIUM CHLORIDE: 300; 5; 450 INJECTION, SOLUTION INTRAVENOUS at 07:37

## 2018-01-01 RX ADMIN — Medication 10 ML: at 21:29

## 2018-01-01 RX ADMIN — FENTANYL CITRATE 100 MCG: 50 INJECTION, SOLUTION INTRAMUSCULAR; INTRAVENOUS at 19:25

## 2018-01-01 RX ADMIN — METHYLPREDNISOLONE 4 MG: 4 TABLET ORAL at 13:14

## 2018-01-01 RX ADMIN — VITAMIN C 1 TABLET: TAB at 08:31

## 2018-01-01 RX ADMIN — IPRATROPIUM BROMIDE AND ALBUTEROL SULFATE 1 AMPULE: .5; 3 SOLUTION RESPIRATORY (INHALATION) at 06:03

## 2018-01-01 RX ADMIN — VANCOMYCIN HYDROCHLORIDE 1250 MG: 10 INJECTION, POWDER, LYOPHILIZED, FOR SOLUTION INTRAVENOUS at 16:56

## 2018-01-01 RX ADMIN — VITAMIN C 1 TABLET: TAB at 09:30

## 2018-01-01 RX ADMIN — MULTIPLE VITAMINS W/ MINERALS TAB 1 TABLET: TAB at 12:07

## 2018-01-01 RX ADMIN — FENTANYL CITRATE 25 MCG: 50 INJECTION, SOLUTION INTRAMUSCULAR; INTRAVENOUS at 23:28

## 2018-01-01 RX ADMIN — IPRATROPIUM BROMIDE AND ALBUTEROL SULFATE 1 AMPULE: .5; 3 SOLUTION RESPIRATORY (INHALATION) at 00:37

## 2018-01-01 RX ADMIN — OXYCODONE HYDROCHLORIDE AND ACETAMINOPHEN 1 TABLET: 5; 325 TABLET ORAL at 10:11

## 2018-01-01 RX ADMIN — FUROSEMIDE 20 MG: 10 INJECTION, SOLUTION INTRAMUSCULAR; INTRAVENOUS at 13:19

## 2018-01-01 RX ADMIN — OXYCODONE HYDROCHLORIDE AND ACETAMINOPHEN 1 TABLET: 5; 325 TABLET ORAL at 06:35

## 2018-01-01 RX ADMIN — MORPHINE SULFATE 2 MG: 2 INJECTION, SOLUTION INTRAMUSCULAR; INTRAVENOUS at 10:39

## 2018-01-01 RX ADMIN — IPRATROPIUM BROMIDE AND ALBUTEROL SULFATE 1 AMPULE: 2.5; .5 SOLUTION RESPIRATORY (INHALATION) at 06:15

## 2018-01-01 ASSESSMENT — PAIN DESCRIPTION - ONSET
ONSET: ON-GOING
ONSET: PROGRESSIVE
ONSET: ON-GOING
ONSET: GRADUAL
ONSET: ON-GOING
ONSET: ON-GOING
ONSET: AWAKENED FROM SLEEP
ONSET: ON-GOING
ONSET: GRADUAL
ONSET: ON-GOING
ONSET: GRADUAL
ONSET: ON-GOING

## 2018-01-01 ASSESSMENT — PULMONARY FUNCTION TESTS
PIF_VALUE: 23
PIF_VALUE: 26
PIF_VALUE: 24
PIF_VALUE: 23
PIF_VALUE: 24
PIF_VALUE: 23
PIF_VALUE: 22
PIF_VALUE: 25
PIF_VALUE: 25
PIF_VALUE: 2
PIF_VALUE: 24
PIF_VALUE: 16
PIF_VALUE: 25
PIF_VALUE: 23
PIF_VALUE: 23
PIF_VALUE: 20
PIF_VALUE: 16
PIF_VALUE: 23
PIF_VALUE: 23
PIF_VALUE: 18
PIF_VALUE: 24
PIF_VALUE: 24
PIF_VALUE: 25
PIF_VALUE: 25
PIF_VALUE: 24
PIF_VALUE: 4
PIF_VALUE: 23
PIF_VALUE: 24
PIF_VALUE: 19
PIF_VALUE: 19
PIF_VALUE: 23
PIF_VALUE: 0
PIF_VALUE: 23
PIF_VALUE: 24
PIF_VALUE: 22
PIF_VALUE: 17
PIF_VALUE: 23
PIF_VALUE: 24
PIF_VALUE: 23
PIF_VALUE: 24
PIF_VALUE: 19
PIF_VALUE: 24
PIF_VALUE: 2
PIF_VALUE: 25
PIF_VALUE: 26
PIF_VALUE: 23
PIF_VALUE: 23
PIF_VALUE: 4
PIF_VALUE: 24
PIF_VALUE: 23
PIF_VALUE: 24
PIF_VALUE: 24
PIF_VALUE: 26
PIF_VALUE: 25
PIF_VALUE: 26
PIF_VALUE: 23
PIF_VALUE: 25
PIF_VALUE: 15
PIF_VALUE: 23
PIF_VALUE: 24
PIF_VALUE: 20
PIF_VALUE: 23
PIF_VALUE: 23
PIF_VALUE: 26
PIF_VALUE: 21
PIF_VALUE: 24
PIF_VALUE: 24
PIF_VALUE: 20
PIF_VALUE: 23
PIF_VALUE: 29
PIF_VALUE: 22
PIF_VALUE: 23
PIF_VALUE: 23
PIF_VALUE: 24
PIF_VALUE: 24
PIF_VALUE: 20
PIF_VALUE: 24
PIF_VALUE: 23
PIF_VALUE: 15
PIF_VALUE: 24
PIF_VALUE: 23
PIF_VALUE: 25
PIF_VALUE: 13
PIF_VALUE: 25
PIF_VALUE: 24
PIF_VALUE: 25
PIF_VALUE: 24
PIF_VALUE: 23
PIF_VALUE: 19
PIF_VALUE: 23
PIF_VALUE: 10
PIF_VALUE: 25
PIF_VALUE: 24
PIF_VALUE: 21
PIF_VALUE: 24
PIF_VALUE: 21
PIF_VALUE: 25
PIF_VALUE: 24
PIF_VALUE: 23
PIF_VALUE: 22
PIF_VALUE: 24
PIF_VALUE: 24
PIF_VALUE: 25
PIF_VALUE: 25
PIF_VALUE: 23
PIF_VALUE: 3
PIF_VALUE: 23
PIF_VALUE: 25
PIF_VALUE: 25
PIF_VALUE: 23
PIF_VALUE: 23
PIF_VALUE: 24
PIF_VALUE: 22
PIF_VALUE: 25
PIF_VALUE: 25
PIF_VALUE: 20
PIF_VALUE: 24
PIF_VALUE: 14
PIF_VALUE: 23
PIF_VALUE: 23
PIF_VALUE: 26
PIF_VALUE: 24
PIF_VALUE: 24
PIF_VALUE: 17
PIF_VALUE: 5

## 2018-01-01 ASSESSMENT — PAIN DESCRIPTION - FREQUENCY
FREQUENCY: CONTINUOUS
FREQUENCY: CONTINUOUS
FREQUENCY: INTERMITTENT
FREQUENCY: CONTINUOUS
FREQUENCY: INTERMITTENT
FREQUENCY: INTERMITTENT
FREQUENCY: CONTINUOUS
FREQUENCY: INTERMITTENT
FREQUENCY: CONTINUOUS
FREQUENCY: INTERMITTENT
FREQUENCY: CONTINUOUS
FREQUENCY: INTERMITTENT
FREQUENCY: CONTINUOUS
FREQUENCY: INTERMITTENT
FREQUENCY: CONTINUOUS

## 2018-01-01 ASSESSMENT — PAIN DESCRIPTION - PAIN TYPE
TYPE: SURGICAL PAIN
TYPE: CHRONIC PAIN
TYPE: CHRONIC PAIN
TYPE: ACUTE PAIN;SURGICAL PAIN
TYPE: CHRONIC PAIN
TYPE: CHRONIC PAIN
TYPE: SURGICAL PAIN
TYPE: CHRONIC PAIN
TYPE: SURGICAL PAIN
TYPE: CHRONIC PAIN
TYPE: ACUTE PAIN;CHRONIC PAIN
TYPE: SURGICAL PAIN
TYPE: ACUTE PAIN;SURGICAL PAIN
TYPE: SURGICAL PAIN
TYPE: CHRONIC PAIN
TYPE: ACUTE PAIN
TYPE: CHRONIC PAIN
TYPE: SURGICAL PAIN
TYPE: CHRONIC PAIN
TYPE: SURGICAL PAIN
TYPE: SURGICAL PAIN
TYPE: CHRONIC PAIN
TYPE: CHRONIC PAIN
TYPE: SURGICAL PAIN
TYPE: CHRONIC PAIN
TYPE: SURGICAL PAIN
TYPE: CHRONIC PAIN
TYPE: ACUTE PAIN
TYPE: CHRONIC PAIN
TYPE: SURGICAL PAIN
TYPE: SURGICAL PAIN
TYPE: ACUTE PAIN
TYPE: SURGICAL PAIN
TYPE: CHRONIC PAIN
TYPE: CHRONIC PAIN
TYPE: SURGICAL PAIN
TYPE: SURGICAL PAIN
TYPE: CHRONIC PAIN
TYPE: CHRONIC PAIN
TYPE: SURGICAL PAIN
TYPE: SURGICAL PAIN
TYPE: ACUTE PAIN
TYPE: SURGICAL PAIN
TYPE: SURGICAL PAIN;ACUTE PAIN
TYPE: SURGICAL PAIN
TYPE: CHRONIC PAIN
TYPE: CHRONIC PAIN
TYPE: ACUTE PAIN
TYPE: CHRONIC PAIN
TYPE: SURGICAL PAIN
TYPE: ACUTE PAIN
TYPE: CHRONIC PAIN
TYPE: CHRONIC PAIN
TYPE: ACUTE PAIN
TYPE: CHRONIC PAIN
TYPE: SURGICAL PAIN
TYPE: CHRONIC PAIN
TYPE: SURGICAL PAIN
TYPE: CHRONIC PAIN
TYPE: SURGICAL PAIN
TYPE: SURGICAL PAIN
TYPE: CHRONIC PAIN
TYPE: SURGICAL PAIN
TYPE: CHRONIC PAIN
TYPE: ACUTE PAIN
TYPE: CHRONIC PAIN;ACUTE PAIN
TYPE: SURGICAL PAIN
TYPE: CHRONIC PAIN
TYPE: SURGICAL PAIN
TYPE: ACUTE PAIN;CHRONIC PAIN
TYPE: CHRONIC PAIN
TYPE: ACUTE PAIN
TYPE: CHRONIC PAIN
TYPE: SURGICAL PAIN
TYPE: CHRONIC PAIN
TYPE: ACUTE PAIN
TYPE: SURGICAL PAIN
TYPE: SURGICAL PAIN
TYPE: CHRONIC PAIN
TYPE: SURGICAL PAIN
TYPE: SURGICAL PAIN
TYPE: CHRONIC PAIN
TYPE: CHRONIC PAIN
TYPE: ACUTE PAIN;SURGICAL PAIN
TYPE: ACUTE PAIN
TYPE: SURGICAL PAIN
TYPE: SURGICAL PAIN
TYPE: CHRONIC PAIN
TYPE: ACUTE PAIN;SURGICAL PAIN
TYPE: CHRONIC PAIN
TYPE: ACUTE PAIN;SURGICAL PAIN
TYPE: SURGICAL PAIN
TYPE: CHRONIC PAIN
TYPE: CHRONIC PAIN
TYPE: ACUTE PAIN
TYPE: CHRONIC PAIN
TYPE: CHRONIC PAIN
TYPE: SURGICAL PAIN
TYPE: CHRONIC PAIN;SURGICAL PAIN
TYPE: CHRONIC PAIN
TYPE: CHRONIC PAIN;ACUTE PAIN
TYPE: SURGICAL PAIN
TYPE: ACUTE PAIN
TYPE: SURGICAL PAIN
TYPE: CHRONIC PAIN
TYPE: SURGICAL PAIN
TYPE: CHRONIC PAIN
TYPE: ACUTE PAIN
TYPE: SURGICAL PAIN
TYPE: SURGICAL PAIN
TYPE: CHRONIC PAIN
TYPE: CHRONIC PAIN
TYPE: SURGICAL PAIN
TYPE: ACUTE PAIN
TYPE: SURGICAL PAIN
TYPE: SURGICAL PAIN
TYPE: ACUTE PAIN
TYPE: SURGICAL PAIN
TYPE: SURGICAL PAIN

## 2018-01-01 ASSESSMENT — PAIN DESCRIPTION - LOCATION
LOCATION: BACK
LOCATION: GENERALIZED
LOCATION: GENERALIZED
LOCATION: BACK
LOCATION: GENERALIZED
LOCATION: BACK
LOCATION: BACK;LEG
LOCATION: BACK
LOCATION: GENERALIZED
LOCATION: BACK
LOCATION: GENERALIZED
LOCATION: BACK
LOCATION: BACK;ANKLE
LOCATION: BACK
LOCATION: BACK
LOCATION: GENERALIZED
LOCATION: BACK
LOCATION: BACK;FOOT
LOCATION: BACK
LOCATION: ANKLE
LOCATION: BACK
LOCATION: GENERALIZED
LOCATION: BACK
LOCATION: BACK
LOCATION: ABDOMEN
LOCATION: BACK
LOCATION: BACK;LEG
LOCATION: BACK
LOCATION: GENERALIZED
LOCATION: BACK
LOCATION: BACK
LOCATION: BACK;LEG
LOCATION: BACK
LOCATION: OTHER (COMMENT)
LOCATION: BACK
LOCATION: GENERALIZED
LOCATION: BACK
LOCATION: GENERALIZED
LOCATION: BACK
LOCATION: BACK
LOCATION: GENERALIZED
LOCATION: BACK
LOCATION: GENERALIZED
LOCATION: BACK

## 2018-01-01 ASSESSMENT — PAIN DESCRIPTION - DESCRIPTORS
DESCRIPTORS: ACHING;CONSTANT;DISCOMFORT
DESCRIPTORS: ACHING;DISCOMFORT
DESCRIPTORS: ACHING;DISCOMFORT;SORE
DESCRIPTORS: ACHING;CRAMPING;DISCOMFORT;CONSTANT
DESCRIPTORS: ACHING;BURNING;SPASM
DESCRIPTORS: ACHING;CONSTANT;SORE
DESCRIPTORS: ACHING;DULL;DISCOMFORT
DESCRIPTORS: ACHING;DISCOMFORT
DESCRIPTORS: ACHING;DISCOMFORT;SORE
DESCRIPTORS: ACHING;DISCOMFORT;DULL
DESCRIPTORS: ACHING
DESCRIPTORS: ACHING;DISCOMFORT;CONSTANT
DESCRIPTORS: CONSTANT;DISCOMFORT;SORE
DESCRIPTORS: ACHING
DESCRIPTORS: ACHING;DISCOMFORT
DESCRIPTORS: ACHING;DISCOMFORT
DESCRIPTORS: ACHING;DISCOMFORT;DULL
DESCRIPTORS: ACHING;SORE;THROBBING
DESCRIPTORS: ACHING;DISCOMFORT;SHOOTING
DESCRIPTORS: ACHING;DISCOMFORT
DESCRIPTORS: ACHING;DISCOMFORT
DESCRIPTORS: ACHING;DISCOMFORT;SORE
DESCRIPTORS: ACHING;CONSTANT;DISCOMFORT
DESCRIPTORS: ACHING;DULL;DISCOMFORT
DESCRIPTORS: ACHING;DISCOMFORT;SORE
DESCRIPTORS: ACHING;DISCOMFORT;DULL
DESCRIPTORS: ACHING;DISCOMFORT
DESCRIPTORS: ACHING;DISCOMFORT;SORE
DESCRIPTORS: ACHING;DISCOMFORT;DULL;SORE
DESCRIPTORS: ACHING;DISCOMFORT
DESCRIPTORS: ACHING;CONSTANT;DISCOMFORT
DESCRIPTORS: ACHING;DISCOMFORT
DESCRIPTORS: ACHING;DISCOMFORT;DULL
DESCRIPTORS: ACHING;SHOOTING;SORE
DESCRIPTORS: ACHING;DISCOMFORT;SORE
DESCRIPTORS: ACHING;DISCOMFORT;PRESSURE
DESCRIPTORS: ACHING;CONSTANT;DISCOMFORT
DESCRIPTORS: ACHING;DISCOMFORT
DESCRIPTORS: ACHING;DISCOMFORT;SORE
DESCRIPTORS: ACHING;DISCOMFORT;DULL
DESCRIPTORS: ACHING;BURNING;DISCOMFORT
DESCRIPTORS: ACHING;DISCOMFORT;SORE
DESCRIPTORS: ACHING;DISCOMFORT;SORE
DESCRIPTORS: ACHING;CONSTANT;SORE
DESCRIPTORS: ACHING;DISCOMFORT
DESCRIPTORS: ACHING;DISCOMFORT;CONSTANT
DESCRIPTORS: ACHING;DISCOMFORT
DESCRIPTORS: ACHING;DISCOMFORT;PRESSURE
DESCRIPTORS: ACHING;BURNING
DESCRIPTORS: ACHING;CRAMPING;DISCOMFORT
DESCRIPTORS: ACHING;DULL;DISCOMFORT
DESCRIPTORS: ACHING;DISCOMFORT
DESCRIPTORS: CONSTANT;NAGGING
DESCRIPTORS: ACHING;DISCOMFORT;DULL
DESCRIPTORS: ACHING;DISCOMFORT
DESCRIPTORS: ACHING;DULL
DESCRIPTORS: ACHING;DISCOMFORT
DESCRIPTORS: ACHING;DISCOMFORT;SORE
DESCRIPTORS: ACHING;CONSTANT;DISCOMFORT
DESCRIPTORS: ACHING
DESCRIPTORS: ACHING;CONSTANT;DULL
DESCRIPTORS: ACHING;DISCOMFORT
DESCRIPTORS: ACHING;DISCOMFORT
DESCRIPTORS: ACHING;DISCOMFORT;SORE
DESCRIPTORS: ACHING;SORE;DISCOMFORT
DESCRIPTORS: ACHING
DESCRIPTORS: ACHING;SORE;SHOOTING
DESCRIPTORS: CONSTANT;DISCOMFORT;SORE
DESCRIPTORS: ACHING;DISCOMFORT
DESCRIPTORS: ACHING;DISCOMFORT;SORE
DESCRIPTORS: ACHING;DISCOMFORT
DESCRIPTORS: ACHING;CONSTANT;DISCOMFORT
DESCRIPTORS: ACHING
DESCRIPTORS: ACHING
DESCRIPTORS: ACHING;DISCOMFORT
DESCRIPTORS: ACHING;DISCOMFORT
DESCRIPTORS: ACHING;DISCOMFORT;SORE
DESCRIPTORS: ACHING;CONSTANT;DISCOMFORT
DESCRIPTORS: ACHING;CONSTANT;CRAMPING;DISCOMFORT
DESCRIPTORS: ACHING;CRAMPING;DISCOMFORT
DESCRIPTORS: ACHING;CONSTANT
DESCRIPTORS: ACHING;DISCOMFORT
DESCRIPTORS: ACHING;DISCOMFORT
DESCRIPTORS: SORE;ACHING;DISCOMFORT
DESCRIPTORS: ACHING;DISCOMFORT
DESCRIPTORS: ACHING;DISCOMFORT
DESCRIPTORS: ACHING;BURNING;DISCOMFORT
DESCRIPTORS: ACHING;DISCOMFORT;SORE
DESCRIPTORS: ACHING;DULL
DESCRIPTORS: ACHING;DISCOMFORT
DESCRIPTORS: ACHING;DISCOMFORT;DULL
DESCRIPTORS: ACHING;SHOOTING;THROBBING
DESCRIPTORS: SORE;ACHING;DISCOMFORT
DESCRIPTORS: ACHING;CONSTANT
DESCRIPTORS: ACHING;DISCOMFORT;PRESSURE
DESCRIPTORS: ACHING;DISCOMFORT;SORE
DESCRIPTORS: ACHING;DISCOMFORT
DESCRIPTORS: DISCOMFORT
DESCRIPTORS: ACHING;DISCOMFORT;DULL
DESCRIPTORS: ACHING;CRUSHING;DULL;DISCOMFORT
DESCRIPTORS: ACHING;CONSTANT;DISCOMFORT
DESCRIPTORS: ACHING;DISCOMFORT
DESCRIPTORS: DISCOMFORT;ACHING;CONSTANT
DESCRIPTORS: ACHING;DISCOMFORT
DESCRIPTORS: CONSTANT;DISCOMFORT;SORE
DESCRIPTORS: ACHING;DISCOMFORT;PRESSURE
DESCRIPTORS: ACHING;DISCOMFORT;SORE
DESCRIPTORS: ACHING;DISCOMFORT;DULL
DESCRIPTORS: ACHING;DISCOMFORT;DULL
DESCRIPTORS: ACHING;DISCOMFORT;SHOOTING
DESCRIPTORS: PATIENT UNABLE TO DESCRIBE
DESCRIPTORS: ACHING;DISCOMFORT;SORE
DESCRIPTORS: THROBBING;STABBING;DISCOMFORT
DESCRIPTORS: ACHING;DISCOMFORT
DESCRIPTORS: ACHING;DISCOMFORT
DESCRIPTORS: ACHING;DISCOMFORT;PRESSURE
DESCRIPTORS: ACHING
DESCRIPTORS: ACHING;DULL
DESCRIPTORS: ACHING;BURNING;DISCOMFORT
DESCRIPTORS: ACHING;DISCOMFORT
DESCRIPTORS: ACHING;DISCOMFORT
DESCRIPTORS: ACHING;CONSTANT;DISCOMFORT
DESCRIPTORS: ACHING;DISCOMFORT;SORE
DESCRIPTORS: ACHING;DISCOMFORT
DESCRIPTORS: ACHING
DESCRIPTORS: ACHING;DISCOMFORT
DESCRIPTORS: ACHING
DESCRIPTORS: ACHING
DESCRIPTORS: ACHING;DISCOMFORT;PRESSURE
DESCRIPTORS: ACHING;DISCOMFORT
DESCRIPTORS: DISCOMFORT
DESCRIPTORS: ACHING;DISCOMFORT
DESCRIPTORS: ACHING;CONSTANT;DULL;SORE
DESCRIPTORS: ACHING;DISCOMFORT;CONSTANT
DESCRIPTORS: ACHING;DISCOMFORT;SORE
DESCRIPTORS: DULL;PRESSURE
DESCRIPTORS: ACHING;DISCOMFORT
DESCRIPTORS: ACHING;DISCOMFORT
DESCRIPTORS: ACHING;STABBING;THROBBING
DESCRIPTORS: ACHING
DESCRIPTORS: ACHING;SHOOTING;SHARP
DESCRIPTORS: ACHING;STABBING;THROBBING
DESCRIPTORS: ACHING

## 2018-01-01 ASSESSMENT — PAIN SCALES - GENERAL
PAINLEVEL_OUTOF10: 7
PAINLEVEL_OUTOF10: 3
PAINLEVEL_OUTOF10: 7
PAINLEVEL_OUTOF10: 8
PAINLEVEL_OUTOF10: 4
PAINLEVEL_OUTOF10: 4
PAINLEVEL_OUTOF10: 8
PAINLEVEL_OUTOF10: 8
PAINLEVEL_OUTOF10: 0
PAINLEVEL_OUTOF10: 7
PAINLEVEL_OUTOF10: 8
PAINLEVEL_OUTOF10: 7
PAINLEVEL_OUTOF10: 3
PAINLEVEL_OUTOF10: 8
PAINLEVEL_OUTOF10: 0
PAINLEVEL_OUTOF10: 7
PAINLEVEL_OUTOF10: 9
PAINLEVEL_OUTOF10: 6
PAINLEVEL_OUTOF10: 9
PAINLEVEL_OUTOF10: 4
PAINLEVEL_OUTOF10: 0
PAINLEVEL_OUTOF10: 10
PAINLEVEL_OUTOF10: 8
PAINLEVEL_OUTOF10: 8
PAINLEVEL_OUTOF10: 6
PAINLEVEL_OUTOF10: 7
PAINLEVEL_OUTOF10: 8
PAINLEVEL_OUTOF10: 7
PAINLEVEL_OUTOF10: 8
PAINLEVEL_OUTOF10: 8
PAINLEVEL_OUTOF10: 7
PAINLEVEL_OUTOF10: 3
PAINLEVEL_OUTOF10: 7
PAINLEVEL_OUTOF10: 2
PAINLEVEL_OUTOF10: 0
PAINLEVEL_OUTOF10: 8
PAINLEVEL_OUTOF10: 7
PAINLEVEL_OUTOF10: 4
PAINLEVEL_OUTOF10: 0
PAINLEVEL_OUTOF10: 8
PAINLEVEL_OUTOF10: 6
PAINLEVEL_OUTOF10: 6
PAINLEVEL_OUTOF10: 7
PAINLEVEL_OUTOF10: 7
PAINLEVEL_OUTOF10: 8
PAINLEVEL_OUTOF10: 0
PAINLEVEL_OUTOF10: 4
PAINLEVEL_OUTOF10: 5
PAINLEVEL_OUTOF10: 0
PAINLEVEL_OUTOF10: 6
PAINLEVEL_OUTOF10: 8
PAINLEVEL_OUTOF10: 5
PAINLEVEL_OUTOF10: 7
PAINLEVEL_OUTOF10: 5
PAINLEVEL_OUTOF10: 9
PAINLEVEL_OUTOF10: 7
PAINLEVEL_OUTOF10: 8
PAINLEVEL_OUTOF10: 0
PAINLEVEL_OUTOF10: 5
PAINLEVEL_OUTOF10: 5
PAINLEVEL_OUTOF10: 10
PAINLEVEL_OUTOF10: 0
PAINLEVEL_OUTOF10: 8
PAINLEVEL_OUTOF10: 6
PAINLEVEL_OUTOF10: 7
PAINLEVEL_OUTOF10: 5
PAINLEVEL_OUTOF10: 8
PAINLEVEL_OUTOF10: 0
PAINLEVEL_OUTOF10: 8
PAINLEVEL_OUTOF10: 0
PAINLEVEL_OUTOF10: 6
PAINLEVEL_OUTOF10: 5
PAINLEVEL_OUTOF10: 8
PAINLEVEL_OUTOF10: 7
PAINLEVEL_OUTOF10: 10
PAINLEVEL_OUTOF10: 8
PAINLEVEL_OUTOF10: 8
PAINLEVEL_OUTOF10: 9
PAINLEVEL_OUTOF10: 10
PAINLEVEL_OUTOF10: 10
PAINLEVEL_OUTOF10: 7
PAINLEVEL_OUTOF10: 10
PAINLEVEL_OUTOF10: 10
PAINLEVEL_OUTOF10: 8
PAINLEVEL_OUTOF10: 4
PAINLEVEL_OUTOF10: 8
PAINLEVEL_OUTOF10: 6
PAINLEVEL_OUTOF10: 8
PAINLEVEL_OUTOF10: 7
PAINLEVEL_OUTOF10: 8
PAINLEVEL_OUTOF10: 2
PAINLEVEL_OUTOF10: 10
PAINLEVEL_OUTOF10: 7
PAINLEVEL_OUTOF10: 3
PAINLEVEL_OUTOF10: 9
PAINLEVEL_OUTOF10: 10
PAINLEVEL_OUTOF10: 10
PAINLEVEL_OUTOF10: 6
PAINLEVEL_OUTOF10: 0
PAINLEVEL_OUTOF10: 0
PAINLEVEL_OUTOF10: 9
PAINLEVEL_OUTOF10: 8
PAINLEVEL_OUTOF10: 0
PAINLEVEL_OUTOF10: 6
PAINLEVEL_OUTOF10: 5
PAINLEVEL_OUTOF10: 8
PAINLEVEL_OUTOF10: 0
PAINLEVEL_OUTOF10: 7
PAINLEVEL_OUTOF10: 5
PAINLEVEL_OUTOF10: 0
PAINLEVEL_OUTOF10: 9
PAINLEVEL_OUTOF10: 7
PAINLEVEL_OUTOF10: 8
PAINLEVEL_OUTOF10: 7
PAINLEVEL_OUTOF10: 0
PAINLEVEL_OUTOF10: 7
PAINLEVEL_OUTOF10: 6
PAINLEVEL_OUTOF10: 10
PAINLEVEL_OUTOF10: 0
PAINLEVEL_OUTOF10: 10
PAINLEVEL_OUTOF10: 5
PAINLEVEL_OUTOF10: 10
PAINLEVEL_OUTOF10: 0
PAINLEVEL_OUTOF10: 6
PAINLEVEL_OUTOF10: 0
PAINLEVEL_OUTOF10: 0
PAINLEVEL_OUTOF10: 4
PAINLEVEL_OUTOF10: 8
PAINLEVEL_OUTOF10: 2
PAINLEVEL_OUTOF10: 8
PAINLEVEL_OUTOF10: 7
PAINLEVEL_OUTOF10: 8
PAINLEVEL_OUTOF10: 9
PAINLEVEL_OUTOF10: 8
PAINLEVEL_OUTOF10: 6
PAINLEVEL_OUTOF10: 8
PAINLEVEL_OUTOF10: 7
PAINLEVEL_OUTOF10: 5
PAINLEVEL_OUTOF10: 7
PAINLEVEL_OUTOF10: 7
PAINLEVEL_OUTOF10: 4
PAINLEVEL_OUTOF10: 8
PAINLEVEL_OUTOF10: 0
PAINLEVEL_OUTOF10: 8
PAINLEVEL_OUTOF10: 8
PAINLEVEL_OUTOF10: 6
PAINLEVEL_OUTOF10: 5
PAINLEVEL_OUTOF10: 0
PAINLEVEL_OUTOF10: 4
PAINLEVEL_OUTOF10: 0
PAINLEVEL_OUTOF10: 5
PAINLEVEL_OUTOF10: 8
PAINLEVEL_OUTOF10: 2
PAINLEVEL_OUTOF10: 0
PAINLEVEL_OUTOF10: 7
PAINLEVEL_OUTOF10: 4
PAINLEVEL_OUTOF10: 7
PAINLEVEL_OUTOF10: 9
PAINLEVEL_OUTOF10: 9
PAINLEVEL_OUTOF10: 5
PAINLEVEL_OUTOF10: 0
PAINLEVEL_OUTOF10: 8
PAINLEVEL_OUTOF10: 8
PAINLEVEL_OUTOF10: 7
PAINLEVEL_OUTOF10: 4
PAINLEVEL_OUTOF10: 10
PAINLEVEL_OUTOF10: 8
PAINLEVEL_OUTOF10: 7
PAINLEVEL_OUTOF10: 8
PAINLEVEL_OUTOF10: 6
PAINLEVEL_OUTOF10: 7
PAINLEVEL_OUTOF10: 8
PAINLEVEL_OUTOF10: 9
PAINLEVEL_OUTOF10: 7
PAINLEVEL_OUTOF10: 5
PAINLEVEL_OUTOF10: 10
PAINLEVEL_OUTOF10: 10
PAINLEVEL_OUTOF10: 0
PAINLEVEL_OUTOF10: 5
PAINLEVEL_OUTOF10: 6
PAINLEVEL_OUTOF10: 0
PAINLEVEL_OUTOF10: 8
PAINLEVEL_OUTOF10: 7
PAINLEVEL_OUTOF10: 9
PAINLEVEL_OUTOF10: 9
PAINLEVEL_OUTOF10: 0
PAINLEVEL_OUTOF10: 9
PAINLEVEL_OUTOF10: 9
PAINLEVEL_OUTOF10: 8
PAINLEVEL_OUTOF10: 10
PAINLEVEL_OUTOF10: 1
PAINLEVEL_OUTOF10: 10
PAINLEVEL_OUTOF10: 0
PAINLEVEL_OUTOF10: 0
PAINLEVEL_OUTOF10: 8
PAINLEVEL_OUTOF10: 5
PAINLEVEL_OUTOF10: 0
PAINLEVEL_OUTOF10: 7
PAINLEVEL_OUTOF10: 7
PAINLEVEL_OUTOF10: 10
PAINLEVEL_OUTOF10: 8
PAINLEVEL_OUTOF10: 8
PAINLEVEL_OUTOF10: 5
PAINLEVEL_OUTOF10: 4
PAINLEVEL_OUTOF10: 6
PAINLEVEL_OUTOF10: 9
PAINLEVEL_OUTOF10: 8
PAINLEVEL_OUTOF10: 7
PAINLEVEL_OUTOF10: 10
PAINLEVEL_OUTOF10: 7
PAINLEVEL_OUTOF10: 9
PAINLEVEL_OUTOF10: 7
PAINLEVEL_OUTOF10: 0
PAINLEVEL_OUTOF10: 10
PAINLEVEL_OUTOF10: 6
PAINLEVEL_OUTOF10: 4
PAINLEVEL_OUTOF10: 8
PAINLEVEL_OUTOF10: 8
PAINLEVEL_OUTOF10: 9
PAINLEVEL_OUTOF10: 7
PAINLEVEL_OUTOF10: 6
PAINLEVEL_OUTOF10: 0
PAINLEVEL_OUTOF10: 8
PAINLEVEL_OUTOF10: 7
PAINLEVEL_OUTOF10: 8
PAINLEVEL_OUTOF10: 5
PAINLEVEL_OUTOF10: 8
PAINLEVEL_OUTOF10: 0
PAINLEVEL_OUTOF10: 8
PAINLEVEL_OUTOF10: 8
PAINLEVEL_OUTOF10: 6
PAINLEVEL_OUTOF10: 0
PAINLEVEL_OUTOF10: 9
PAINLEVEL_OUTOF10: 8
PAINLEVEL_OUTOF10: 7
PAINLEVEL_OUTOF10: 0
PAINLEVEL_OUTOF10: 0
PAINLEVEL_OUTOF10: 7
PAINLEVEL_OUTOF10: 0
PAINLEVEL_OUTOF10: 7
PAINLEVEL_OUTOF10: 7
PAINLEVEL_OUTOF10: 0
PAINLEVEL_OUTOF10: 10
PAINLEVEL_OUTOF10: 5
PAINLEVEL_OUTOF10: 0
PAINLEVEL_OUTOF10: 0
PAINLEVEL_OUTOF10: 8
PAINLEVEL_OUTOF10: 4
PAINLEVEL_OUTOF10: 8
PAINLEVEL_OUTOF10: 4
PAINLEVEL_OUTOF10: 9
PAINLEVEL_OUTOF10: 8
PAINLEVEL_OUTOF10: 0
PAINLEVEL_OUTOF10: 9
PAINLEVEL_OUTOF10: 9
PAINLEVEL_OUTOF10: 7
PAINLEVEL_OUTOF10: 10
PAINLEVEL_OUTOF10: 8
PAINLEVEL_OUTOF10: 0
PAINLEVEL_OUTOF10: 8
PAINLEVEL_OUTOF10: 8
PAINLEVEL_OUTOF10: 0
PAINLEVEL_OUTOF10: 4
PAINLEVEL_OUTOF10: 7
PAINLEVEL_OUTOF10: 7
PAINLEVEL_OUTOF10: 0
PAINLEVEL_OUTOF10: 0
PAINLEVEL_OUTOF10: 8
PAINLEVEL_OUTOF10: 0
PAINLEVEL_OUTOF10: 4
PAINLEVEL_OUTOF10: 8
PAINLEVEL_OUTOF10: 0
PAINLEVEL_OUTOF10: 0
PAINLEVEL_OUTOF10: 7
PAINLEVEL_OUTOF10: 7
PAINLEVEL_OUTOF10: 8
PAINLEVEL_OUTOF10: 7
PAINLEVEL_OUTOF10: 5
PAINLEVEL_OUTOF10: 7
PAINLEVEL_OUTOF10: 7
PAINLEVEL_OUTOF10: 5
PAINLEVEL_OUTOF10: 5
PAINLEVEL_OUTOF10: 8
PAINLEVEL_OUTOF10: 10
PAINLEVEL_OUTOF10: 10
PAINLEVEL_OUTOF10: 5
PAINLEVEL_OUTOF10: 0
PAINLEVEL_OUTOF10: 7
PAINLEVEL_OUTOF10: 9
PAINLEVEL_OUTOF10: 4
PAINLEVEL_OUTOF10: 10
PAINLEVEL_OUTOF10: 5
PAINLEVEL_OUTOF10: 6
PAINLEVEL_OUTOF10: 7
PAINLEVEL_OUTOF10: 0
PAINLEVEL_OUTOF10: 0
PAINLEVEL_OUTOF10: 8
PAINLEVEL_OUTOF10: 7
PAINLEVEL_OUTOF10: 8
PAINLEVEL_OUTOF10: 3
PAINLEVEL_OUTOF10: 8
PAINLEVEL_OUTOF10: 9
PAINLEVEL_OUTOF10: 3
PAINLEVEL_OUTOF10: 7
PAINLEVEL_OUTOF10: 8
PAINLEVEL_OUTOF10: 4
PAINLEVEL_OUTOF10: 8
PAINLEVEL_OUTOF10: 0
PAINLEVEL_OUTOF10: 8
PAINLEVEL_OUTOF10: 8
PAINLEVEL_OUTOF10: 7
PAINLEVEL_OUTOF10: 7
PAINLEVEL_OUTOF10: 4
PAINLEVEL_OUTOF10: 8
PAINLEVEL_OUTOF10: 6
PAINLEVEL_OUTOF10: 4
PAINLEVEL_OUTOF10: 9
PAINLEVEL_OUTOF10: 6
PAINLEVEL_OUTOF10: 8
PAINLEVEL_OUTOF10: 8
PAINLEVEL_OUTOF10: 0
PAINLEVEL_OUTOF10: 7
PAINLEVEL_OUTOF10: 0
PAINLEVEL_OUTOF10: 0
PAINLEVEL_OUTOF10: 10
PAINLEVEL_OUTOF10: 0
PAINLEVEL_OUTOF10: 7
PAINLEVEL_OUTOF10: 4
PAINLEVEL_OUTOF10: 0
PAINLEVEL_OUTOF10: 7
PAINLEVEL_OUTOF10: 8
PAINLEVEL_OUTOF10: 8
PAINLEVEL_OUTOF10: 7

## 2018-01-01 ASSESSMENT — PAIN SCALES - PAIN ASSESSMENT IN ADVANCED DEMENTIA (PAINAD)
FACIALEXPRESSION: 1
CONSOLABILITY: 1
CONSOLABILITY: 1
BREATHING: 1
BREATHING: 1
NEGVOCALIZATION: 0
BREATHING: 1
NEGVOCALIZATION: 2
BREATHING: 1
NEGVOCALIZATION: 0
FACIALEXPRESSION: 1
CONSOLABILITY: 1
FACIALEXPRESSION: 1
FACIALEXPRESSION: 1
BODYLANGUAGE: 0
NEGVOCALIZATION: 0
BODYLANGUAGE: 0
BREATHING: 1
BREATHING: 1
TOTALSCORE: 3
TOTALSCORE: 4
FACIALEXPRESSION: 1
CONSOLABILITY: 1
NEGVOCALIZATION: 2
FACIALEXPRESSION: 1
TOTALSCORE: 6
TOTALSCORE: 5
BODYLANGUAGE: 0
CONSOLABILITY: 1
CONSOLABILITY: 1
TOTALSCORE: 3
TOTALSCORE: 3
NEGVOCALIZATION: 1
BODYLANGUAGE: 1
BODYLANGUAGE: 0
BODYLANGUAGE: 0

## 2018-01-01 ASSESSMENT — PAIN DESCRIPTION - PROGRESSION
CLINICAL_PROGRESSION: NOT CHANGED
CLINICAL_PROGRESSION: GRADUALLY IMPROVING
CLINICAL_PROGRESSION: GRADUALLY IMPROVING
CLINICAL_PROGRESSION: NOT CHANGED
CLINICAL_PROGRESSION: GRADUALLY IMPROVING
CLINICAL_PROGRESSION: NOT CHANGED
CLINICAL_PROGRESSION: GRADUALLY IMPROVING
CLINICAL_PROGRESSION: NOT CHANGED
CLINICAL_PROGRESSION: GRADUALLY WORSENING
CLINICAL_PROGRESSION: NOT CHANGED
CLINICAL_PROGRESSION: GRADUALLY IMPROVING
CLINICAL_PROGRESSION: NOT CHANGED
CLINICAL_PROGRESSION: NOT CHANGED
CLINICAL_PROGRESSION: GRADUALLY WORSENING
CLINICAL_PROGRESSION: NOT CHANGED
CLINICAL_PROGRESSION: GRADUALLY IMPROVING
CLINICAL_PROGRESSION: NOT CHANGED
CLINICAL_PROGRESSION: NOT CHANGED
CLINICAL_PROGRESSION: GRADUALLY IMPROVING
CLINICAL_PROGRESSION: NOT CHANGED
CLINICAL_PROGRESSION: GRADUALLY WORSENING
CLINICAL_PROGRESSION: NOT CHANGED
CLINICAL_PROGRESSION: GRADUALLY IMPROVING
CLINICAL_PROGRESSION: NOT CHANGED
CLINICAL_PROGRESSION: GRADUALLY IMPROVING
CLINICAL_PROGRESSION: NOT CHANGED
CLINICAL_PROGRESSION: GRADUALLY IMPROVING
CLINICAL_PROGRESSION: NOT CHANGED
CLINICAL_PROGRESSION: GRADUALLY IMPROVING
CLINICAL_PROGRESSION: GRADUALLY IMPROVING
CLINICAL_PROGRESSION: NOT CHANGED
CLINICAL_PROGRESSION: GRADUALLY IMPROVING
CLINICAL_PROGRESSION: GRADUALLY IMPROVING
CLINICAL_PROGRESSION: NOT CHANGED
CLINICAL_PROGRESSION: GRADUALLY IMPROVING
CLINICAL_PROGRESSION: NOT CHANGED
CLINICAL_PROGRESSION: NOT CHANGED

## 2018-01-01 ASSESSMENT — PAIN DESCRIPTION - ORIENTATION
ORIENTATION: MID
ORIENTATION: MID;UPPER
ORIENTATION: MID
ORIENTATION: LOWER;MID
ORIENTATION: MID
ORIENTATION: MID
ORIENTATION: RIGHT;LEFT
ORIENTATION: MID
ORIENTATION: MID;UPPER
ORIENTATION: MID
ORIENTATION: RIGHT;LEFT
ORIENTATION: UPPER
ORIENTATION: MID
ORIENTATION: MID
ORIENTATION: LOWER;MID
ORIENTATION: UPPER
ORIENTATION: UPPER
ORIENTATION: MID
ORIENTATION: RIGHT;LEFT
ORIENTATION: LOWER
ORIENTATION: MID;LOWER
ORIENTATION: UPPER;MID
ORIENTATION: LOWER
ORIENTATION: LOWER;MID
ORIENTATION: UPPER
ORIENTATION: MID
ORIENTATION: LOWER
ORIENTATION: UPPER
ORIENTATION: LOWER
ORIENTATION: MID
ORIENTATION: MID
ORIENTATION: MID;UPPER
ORIENTATION: UPPER
ORIENTATION: LOWER
ORIENTATION: UPPER
ORIENTATION: MID;UPPER
ORIENTATION: MID;LOWER
ORIENTATION: MID
ORIENTATION: MID;LOWER
ORIENTATION: LOWER;MID
ORIENTATION: LOWER;MID
ORIENTATION: UPPER;MID
ORIENTATION: UPPER
ORIENTATION: MID;LOWER
ORIENTATION: MID;UPPER
ORIENTATION: UPPER
ORIENTATION: MID
ORIENTATION: UPPER
ORIENTATION: MID;UPPER
ORIENTATION: MID
ORIENTATION: LOWER
ORIENTATION: RIGHT;LEFT
ORIENTATION: MID
ORIENTATION: UPPER;MID
ORIENTATION: UPPER;MID
ORIENTATION: MID
ORIENTATION: MID
ORIENTATION: UPPER
ORIENTATION: RIGHT;LEFT
ORIENTATION: MID
ORIENTATION: LOWER;MID
ORIENTATION: MID
ORIENTATION: LOWER
ORIENTATION: UPPER;MID
ORIENTATION: LOWER;MID
ORIENTATION: MID
ORIENTATION: RIGHT;LEFT
ORIENTATION: RIGHT;LEFT
ORIENTATION: MID
ORIENTATION: INNER
ORIENTATION: LOWER;MID
ORIENTATION: LOWER
ORIENTATION: MID;UPPER
ORIENTATION: MID
ORIENTATION: UPPER
ORIENTATION: MID;UPPER
ORIENTATION: MID;UPPER
ORIENTATION: MID
ORIENTATION: UPPER
ORIENTATION: UPPER
ORIENTATION: RIGHT;LEFT
ORIENTATION: MID;LOWER
ORIENTATION: MID
ORIENTATION: MID
ORIENTATION: RIGHT;LEFT
ORIENTATION: MID
ORIENTATION: MID;LOWER
ORIENTATION: MID
ORIENTATION: MID
ORIENTATION: UPPER
ORIENTATION: LOWER;MID
ORIENTATION: MID;UPPER
ORIENTATION: MID
ORIENTATION: LOWER;MID
ORIENTATION: UPPER
ORIENTATION: UPPER;MID
ORIENTATION: LOWER
ORIENTATION: INNER
ORIENTATION: LOWER
ORIENTATION: MID
ORIENTATION: LOWER
ORIENTATION: MID
ORIENTATION: UPPER
ORIENTATION: MID;UPPER

## 2018-01-01 ASSESSMENT — ENCOUNTER SYMPTOMS
VOMITING: 0
NAUSEA: 0
NAUSEA: 0
EYE REDNESS: 0
VOMITING: 0
SHORTNESS OF BREATH: 1
COUGH: 0
WHEEZING: 0
SHORTNESS OF BREATH: 1
ABDOMINAL DISTENTION: 0
EYE PAIN: 0
BACK PAIN: 0
DIARRHEA: 0
ABDOMINAL PAIN: 1
SINUS PRESSURE: 0
EYE REDNESS: 0
SORE THROAT: 0
EYE DISCHARGE: 0
SHORTNESS OF BREATH: 0

## 2018-05-27 PROBLEM — R29.898 WEAKNESS OF BOTH LEGS: Status: ACTIVE | Noted: 2018-01-01

## 2018-06-01 PROBLEM — C79.51 METASTATIC CANCER TO SPINE (HCC): Status: ACTIVE | Noted: 2018-01-01

## 2018-06-08 PROBLEM — D49.7: Status: ACTIVE | Noted: 2018-01-01

## 2018-06-09 NOTE — H&P
Department of Physical Medicine & Rehabilitation  Attending History and Physical    Family Physician: Murel Essex, MD    Referring Physician:  [facility] Physicians: Donnie Kwok DO. Consulting Physician:  Morgan Geiger MD.    CHIEF COMPLAINT:  Weakness of both L.EX., Inability to walk. Date of Onset:  3 - 4 weeks prior to admission to the ARU. Indication for Rehabilitation Admission:  Spinal Cord Dysfunction:  Non-Traumatic:  04.130 Other Non-Traumatic Metastatic lung cancer    The main medical problem(s) being actively managed by the physicians and requiring 24 hour rehabilitation nursing care during this stay include:  ADLs, Gait, Bowel and Bladder Dysfunction. This patient has rehabilitation potential for functional improvement. The domains of functional impairment present in this patient which will require an intensive and interdisciplinary rehabilitation environment include:  self care, mobility, motor dysfunction, bowel/bladder management, pain management and safety. HISTORY OF PRESENT ILLNESS:       The patient is a 81 y.o. female with significant past medical history of  Lung cancer, on active chemotherapy,  Keytruda, followed by Dr Ritu Carlson,   who presented to [facility] on 5/27/2018, with severe  Back pain and  a 2 weeks history of gradual numbness and weakness of the lower extremities, left > right. CT  Scan of the spine revealed bone mets  At T4-5. She was transferred to [facility] for further management. Evaluated by Dr Mecca Gonsales, from oncology, and was started on Decadron. Evaluated by Dr Morgan Geiger,  MRI of the Cervical, Thoracic, and Lumbar spine, on 5/28:   Right Cerebellar intra-axial metastases measuring 8 mm. 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 Surgical Decompression was recommended. Evaluated by Dr Neri butts radiation was recommended.   On 5/29/2018, patient underwent surgery for Decompressive Thoracic Laminectomy from T4 to T6  By Dr Eryn Dean. Post Op was fitted with a TLSO. Post op was unable to move her legs and bowel and had overflow urinary incontinence and a bladder scan of  > 800 ml, which required insertion of a Banda catheter. Abdomen was very distended,  KUB 6/6:  Adynamic ileus with distended bowel loops throughout the abdomen. Evaluated by General Surgery,  started on a clear liquid diet,  Reglan and a bowel regimen. Started PT and OT, and once stable was transferred to the Rehabilitation unit for a Comprehensive inpatient Rehabilitation Program.    CURRENT ASSESSMENTS:    PHYSICAL THERAPY:  Bed mobility: MAX A of 2. Transfers: Dependent of 2. Gait: non ambulatory. OCCUPATIONAL THERAPY:  ADLs and self care: MAX A to DEP. SPEECH: WNL. PSYCHOLOGY:  N/A  SOCIAL CONCERNS:  None. FALLS:  no  SAFETY AWARENESS:  Good. Past Medical History:    HTN., GERD. OA, Osteopenia. Stage IV Adenocarcinoma of the lung with mets to T5. Received Palliative Radiation to T5, in August and September 2017. Past Surgical History:    Pilonidal cyst excision in 1963. Right TYSON, Dr Qi Rizo, May 2017.     Current Facility-Administered Medications   Medication Dose Route Frequency Provider Last Rate Last Dose    magnesium hydroxide (MILK OF MAGNESIA) 400 MG/5ML suspension 30 mL  30 mL Oral Daily PRN Jared De Leon DO        on[facility]etron [facility]) injection 4 mg  4 mg Intravenous Q6H PRN Ann Rojas DO        [START ON 6/9/2018] bisacodyl (DULCOLAX) suppository 10 mg  10 mg Rectal Daily Jared De Leon DO        lactulose (CHRONULAC) 10 GM/15ML solution 20 g  20 g Oral TID Ann Rojas DO        metoclopramide (REGLAN) injection 5 mg  5 mg Intravenous Q12H Jared De Leon DO        oxyCODONE-acetaminophen (PERCOCET) 5-325 MG per tablet 1 tablet  1 tablet Oral Q4H PRN Ann Rojas DO   1 tablet at mass index is 32.94 kg/m². GENERAL:    Alert, Oriented, in NAD. SKIN:   Intact. HENT:  EOMI, KIRA, Fundi deferred. NECK:             Supple. CHEST: Clear to auscultation    HEART: RRR. ABDOMEN: Soft, non tender,  With 2 - 3+ BS. Banda catheter in place. NEUROLOGICAL:   Cranial Nerves:   WNL. U.EX. Strength:      4/5. L. EX. Strength:      Trace around the right hip. MUSCULOSKELETAL: Upper back pain. DATA:  See above. ASSESSMENT AND PLAN:      1:  Primary indication for inpatient rehabilitation admission over other settings: Spinal Cord Dysfunction:  Non-Traumatic:  04.130 Other Non-Traumatic Metastatic  Adenocarcinoma of the right lung. 2:  Comorbidities:  Neurogenic bowel & Bladder. 3:  Impact of the medical comorbidities on the rehabilitation program include: Will need bowel program.  4:  Goals for admission: MIN A at the [facility] level. Likelihood of reaching these goals:  Good. Potential to return to Home is good  5:  Barriers to reaching these goals:  Steps at the entrance. 6:  Comments: Will need intensive family teaching. 7:  Recommendations:  Comprehensive inpatient Rehabilitation Program with PT, and OT x 1.5 hours daily, each for 6 days per week, Rehabilitative Nursing Services, , and Therapeutic Recreation. 8:  Estimated length of stay:  4 weeks. 9:  Anticipated disposition:  Home. The potential to achieve that is good.   10:  Precautions:  falls

## 2018-06-09 NOTE — PROGRESS NOTES
Physical Therapy    [address]  Initial Assessment  Evaluating Therapist: Francisco Nelson P.T.    ROOM: [id number]-T  DIAGNOSIS: metastatic disease spine- s/p laminectomy T4-6  PRECAUTIONS: Fall Risk, TLSO & spinal precautions     Social:  Pt lives with  in a 1 floor plan 2 steps and 0 rails. Prior to admission: Independent PTA     Initial Evaluation AM     PM    Short Term Goals Long Term Goals    Was pt agreeable to Eval/treatment? 6/9/2018       Does pt have pain? 8/10 back pain       Bed Mobility  Rolling: Min assist with bedrail  Supine to sit: dependent  Sit to supine: dependent  Scooting: dependent   SBA rolling  Max assist sit to supine Modified Independent rolling  Mod assist sit to supine   Transfers Sit to stand: Dependent  Sliding Board: Dependent  Stand to sit: Dependent  Stand pivot: Dependent   Max/Mod assist sliding board Min/Mod assist sliding board   Ambulation   NT Patient unable    To be assessed as appropriate  To be assessed as appropriate   Walking 10 feet on uneven surface  NT patient unable    To be assessed as appropriate  To be assessed as appropriate   Wheel Chair Mobility Min assist with Bilat UE's X 10 feet    Independent with bilat UE's X 100+ feet level surfaces. Car Transfers NT    Max assist  Mod assist   Stair negotiation: ascended and descended                   NT    To be assessed as appropriate  To be assessed as appropriate   Curb Step:   ascended and descended NT   To be assessed To be assessed   Picking up object off the floor NT   To be assessed To be assessedt   BLE ROM PROM WNL's all aspects       BLE Strength Trace Right LE PF/DF, Knee Ext, Hip Ext.   Left LE 0/5   Increase Right LE strength to P-/P  Increase Left LE Strength to trace/P- Increase Right LE strength to P+/F-  Increase Left LE strength to P/P+   Balance  Seated static balance F+ with UE support       Date Family Teach Completed NA       Is additional Family Teaching

## 2018-06-09 NOTE — PROGRESS NOTES
[facility]  Comprehensive Progress Note    GENERAL:   Covering for Dr Trevor Hopkins. Resting in bed, family visiting. Reports loose stools. Evaluation in progress by different disciplines.     VITALS:   Vitals:    06/08/18 1611 06/09/18 0603 06/09/18 0930   BP: 112/61 124/66 116/60   Pulse: 81 91 103   Resp: 16 18 18   Temp: 98.7 °F (37.1 °C) 98.5 °F (36.9 °C) 98.5 °F (36.9 °C)   TempSrc: Temporal Temporal Temporal   SpO2: 96%     Weight: 163 lb 1.6 oz (74 kg)     Height: 4' 11\" (1.499 m)         MEDICATIONS:  Scheduled Meds:   bisacodyl  10 mg Rectal Daily    lactulose  20 g Oral TID    pantoprazole  40 mg Oral QAM AC    polyethylene glycol  17 g Oral Daily    therapeutic multivitamin-minerals  1 tablet Oral Daily    vitamin B and C  1 tablet Oral Daily    vitamin D  2,000 Units Oral Daily    enoxaparin  40 mg Subcutaneous Daily    metoclopramide  5 mg Oral BID     Continuous Infusions:  PRN Meds:.magnesium hydroxide, ondansetron, oxyCODONE-acetaminophen, sennosides-docusate sodium, acetaminophen     LABS:  CBC with Differential:    Lab Results   Component Value Date    WBC 17.5 06/09/2018    RBC 3.87 06/09/2018    HGB 11.1 06/09/2018    HCT 33.9 06/09/2018     06/09/2018    MCV 87.6 06/09/2018    MCH 28.7 06/09/2018    MCHC 32.7 06/09/2018    RDW 14.2 06/09/2018    METASPCT 2.6 06/09/2018    LYMPHOPCT 2.6 06/09/2018    MONOPCT 4.3 06/09/2018    MYELOPCT 1.7 06/09/2018    BASOPCT 0.5 06/09/2018    MONOSABS 0.70 06/09/2018    LYMPHSABS 0.53 06/09/2018    EOSABS 0.16 06/09/2018    BASOSABS 0.00 06/09/2018     CMP:    Lab Results   Component Value Date     06/09/2018    K 3.5 06/09/2018    CL 96 06/09/2018    CO2 24 06/09/2018    BUN 11 06/09/2018    CREATININE 0.4 06/09/2018    GFRAA >60 06/09/2018    LABGLOM >60 06/09/2018    GLUCOSE 98 06/09/2018    PROT 5.7 06/09/2018    LABALBU 2.5 06/09/2018    CALCIUM 7.2 06/09/2018    BILITOT 0.4 06/09/2018    ALKPHOS 54 06/09/2018    AST 17 06/09/2018    ALT 20 06/09/2018       OBJECTIVE:    General:  Alert, oriented, in NAD. Upper thoracic spine incision healing well, sutures in place, no drainage. HENT:   EOMI, KIRA, Fundi deferred. Neck:   Supple. Heart:   RRR. Chest:   Clear to auscultation. Abdomen:  Soft, non tender with 2 - 3+ BS. Banda Catheter in place. Neurological:  Cranial Nerves:   WNL. U.EX. Strength:      4/5. L. EX. Strength:      Trace around the right hip. Musculo-skeletal: WFL. FUNCTIONAL STATUS:     Cognition:   WNL. Speech:  WNL. ADLs and Self Care: Dependent. Bed Mobility:  Dependent. Transfers:   Dependent. Gait:     Unable. WC Mobility:               10' with both U.EX. with MIN A. Stairs:    Unable. ROM:     DIAGNOSES:    1.) non traumatic Spinal Cord Dysfunction. 2.) Metastatic Lung Cancer, T4 - T5 - T6.  3.) Status Post  Decompression Thoracic Laminectomy T3, T4, & T5.  4.) Paraplegia. 5.) Neurogenic Bladder. 6.) Neurogenic Bowel. 7.) Anemia. 8.) Protein Calorie Malnutrition. PLAN:    1.) Discontinue MOM, and Lactulose, continue Glycolax and Senokot. 2.) Decrease Reglan to once a day.   3.) Continue Rehab Program.

## 2018-06-09 NOTE — PLAN OF CARE
Problem: Falls - Risk of:  Goal: Will remain free from falls  Will remain free from falls   Outcome: Met This Shift      Problem: Sensory:  Goal: Pain level will decrease  Pain level will decrease  Outcome: Met This Shift

## 2018-06-10 NOTE — PROGRESS NOTES
Physical Therapy    Facility/Department: [address] REHAB  Daily Treatment Note  Evaluating Therapist: Karen Pimentel P.T.    [facility]  DIAGNOSIS: metastatic disease spine- s/p laminectomy T4-6  PRECAUTIONS: Fall Risk, soft TLSO & spinal precautions     Social:  Pt lives with  in a 1 floor plan 2 steps and 0 rails. Prior to admission: Independent PTA     Initial Evaluation AM         Short Term Goals Long Term Goals    Was pt agreeable to Eval/treatment? 6/9/2018 Yes      Does pt have pain? 8/10 back pain 6/10 back pain      Bed Mobility  Rolling: Min assist with bedrail  Supine to sit: dependent  Sit to supine: dependent  Scooting: dependent Rolling: Min A with bedrail  Supine to sit: Dependent  Sit to supine: Dependent  Scooting: Dependent  SBA rolling  Max assist sit to supine Modified Independent rolling  Mod assist sit to supine   Transfers Sit to stand: Dependent  Sliding Board: Dependent  Stand to sit: Dependent  Stand pivot: Dependent Sit<>stand: NT  Stand pivot: NT  Slide board: Dependent  Max/Mod assist sliding board Min/Mod assist sliding board   Ambulation   NT Patient unable NT   To be assessed as appropriate  To be assessed as appropriate   Walking 10 feet on uneven surface  NT patient unable NT   To be assessed as appropriate  To be assessed as appropriate   Wheel Chair Mobility Min assist with Bilat UE's X 10 feet NT   Independent with bilat UE's X 100+ feet level surfaces. Car Transfers NT  NT  Max assist  Mod assist   Stair negotiation: ascended and descended                   NT NT   To be assessed as appropriate  To be assessed as appropriate   Curb Step:   ascended and descended NT NT  To be assessed To be assessed   Picking up object off the floor NT NT  To be assessed To be assessedt   BLE ROM PROM WNL's all aspects PROM WNL's all aspects      BLE Strength Trace Right LE PF/DF, Knee Ext, Hip Ext. Left LE 0/5 Trace Right LE PF/DF, Knee Ext, Hip Ext.   Left LE 0/5  Increase

## 2018-06-10 NOTE — PROGRESS NOTES
Therapeutic Recreation Assessment     LEISURE INTEREST SURVEY               Key: P = Past Interest C = Current Interest F = Future Interest     Arts/Crafts:  ___Mechanical  ___Woodworking    ___ Painting   ___Floral arranging ___ Ceramics         _ __ Knitting                                                     _P__ Crocheting        _P__Other: _Jewelry__________      Cooking:  C_ _Baking _C__Cooking    ___   Other: _______   Comments:       Games:  _C__Cards  ___Darts  ___Board games    ___Word games  ___Crossword puzzles    ___Seek-n-find/jumble                   _C__Other: __Phone Games_______      Horticulture:  _P__Vegetables  ___Flowers  ___House plants                                                     ___Other: ___________      House/Yard Care:  ___Ironing  ___Laundry  ___Cleaning                                                      ___Repairs              ___Lawn care                                                     ___Other: ___________      Music Listening/Playing:  ___Classical       ___Big Band       ___Rock-n-Roll                                                     _C__Country          ___R&B             ___Gospel/Hymns                                                     ___Other: ___________      Outdoor Activities:  ___Hunting          ___Fishing          _C__Camping                                                     ___Other: ___________      Pets/Animals:  _C__Dogs             ___Cats                ___Fish                                                     ___Birds             ___Livestock         ___Other: _________    Reading:   ___Newspapers  ___Magazines ___Other:stories                                                     _C__Other: __Romance_________      [place] Activities:  [place]: ___________   [place] Activities: ___________      Shopping:   ___Grocery  ___Clothing  ___Flea market     ___Other: ___________      Socialization: _C__Family  _C__Friends  ___Neighbors       ___Church adaptations for leisure involvement   []Increase Self worth/self esteem  []Community reintegration training   [x]Social interaction  [x]Leisure participation  []Other: ___________    Goals for Therapeutic Recreation Services:   Social Interaction:   Admission Functional [place] Measure: ____5_______  Discharge Functional [place] Measure: ____6_______   Other:________________________________      Leisure Choice/ Increase Jyoti Quality of Life: To participate in 1 premorbid leisure activities of choice by discharge to increase leisure choice and independence thus increasing the overall quality of his/her life. Socialization/Social Interaction: To increase social interaction skills by introducing self 1 x per week at the beginning of TR session     Leisure Activity Tolerance: To increase leisure tolerance by attending 1 leisure activities per week for 20 minutes with active participation. Self Expression: To participate in 1 leisure activity(s) of choice, identifying 1 benefits to leisure participation. Cristela Melendez An

## 2018-06-10 NOTE — PLAN OF CARE
Problem: Falls - Risk of:  Goal: Will remain free from falls  Will remain free from falls   Outcome: Met This Shift    Goal: Absence of physical injury  Absence of physical injury   Outcome: Met This Shift      Problem: Risk for Impaired Skin Integrity  Goal: Tissue integrity - skin and mucous membranes  Structural intactness and normal physiological function of skin and  mucous membranes. Outcome: Met This Shift      Problem: Activity:  Goal: Ability to avoid complications of mobility impairment will improve  Ability to avoid complications of mobility impairment will improve   Outcome: Met This Shift    Goal: Ability to tolerate increased activity will improve  Ability to tolerate increased activity will improve   Outcome: Met This Shift      Problem:  Bowel/Gastric:  Goal: Gastrointestinal status for postoperative course will improve  Gastrointestinal status for postoperative course will improve   Outcome: Met This Shift      Problem: Fluid Volume:  Goal: Maintenance of adequate hydration will improve  Maintenance of adequate hydration will improve   Outcome: Met This Shift      Problem: Health Behavior:  Goal: Ability to state signs and symptoms to report to health care provider will improve  Ability to state signs and symptoms to report to health care provider will improve   Outcome: Met This Shift      Problem: Nutritional:  Goal: Ability to attain and maintain optimal nutritional status will improve  Ability to attain and maintain optimal nutritional status will improve   Outcome: Met This Shift      Problem: Physical Regulation:  Goal: Will remain free from infection  Will remain free from infection   Outcome: Met This Shift      Problem: Respiratory:  Goal: Respiratory status will improve  Respiratory status will improve   Outcome: Met This Shift      Problem: Sensory:  Goal: Pain level will decrease  Pain level will decrease    Outcome: Met This Shift    Goal: Satisfaction with pain management regimen will improve  Satisfaction with pain management regimen will improve   Outcome: Met This Shift      Problem: Skin Integrity:  Goal: Risk for impaired skin integrity will decrease  Risk for impaired skin integrity will decrease   Outcome: Met This Shift      Problem: Urinary Elimination:  Goal: Ability to achieve and maintain adequate urine output will improve  Ability to achieve and maintain adequate urine output will improve   Outcome: Met This Shift      Problem: Pain:  Goal: Control of acute pain  Control of acute pain   Outcome: Met This Shift    Goal: Pain level will decrease  Pain level will decrease    Outcome: Met This Shift

## 2018-06-11 NOTE — PROGRESS NOTES
[facility] CLINICS Physical Medicine and Rehabilitation  Progress Note    GENERAL:   Up in the chair,  visiting. Reports ill fitting TLSO. No functional movements noted in the L.EX.  BS stable since patient off steroids.     VITALS:   Vitals:    06/09/18 0930 06/10/18 0513 06/11/18 0506 06/11/18 1030   BP: 116/60 119/63 112/65 118/73   Pulse: 103 90 83 93   Resp: 18 18 16    Temp: 98.5 °F (36.9 °C) 98.1 °F (36.7 °C) 98.1 °F (36.7 °C)    TempSrc: Temporal Temporal Temporal    SpO2:       Weight:       Height:           Scheduled Meds:   lactulose  20 g Oral TID    pantoprazole  40 mg Oral QAM AC    polyethylene glycol  17 g Oral Daily    therapeutic multivitamin-minerals  1 tablet Oral Daily    vitamin B and C  1 tablet Oral Daily    vitamin D  2,000 Units Oral Daily    enoxaparin  40 mg Subcutaneous Daily     Continuous Infusions:  PRN Meds:.ondansetron, oxyCODONE-acetaminophen, sennosides-docusate sodium, acetaminophen      LABS:  CBC with Differential:    Lab Results   Component Value Date    WBC 17.5 06/09/2018    RBC 3.87 06/09/2018    HGB 11.1 06/09/2018    HCT 33.9 06/09/2018     06/09/2018    MCV 87.6 06/09/2018    MCH 28.7 06/09/2018    MCHC 32.7 06/09/2018    RDW 14.2 06/09/2018    METASPCT 2.6 06/09/2018    LYMPHOPCT 2.6 06/09/2018    MONOPCT 4.3 06/09/2018    MYELOPCT 1.7 06/09/2018    BASOPCT 0.5 06/09/2018    MONOSABS 0.70 06/09/2018    LYMPHSABS 0.53 06/09/2018    EOSABS 0.16 06/09/2018    BASOSABS 0.00 06/09/2018     CMP:    Lab Results   Component Value Date     06/09/2018    K 3.5 06/09/2018    CL 96 06/09/2018    CO2 24 06/09/2018    BUN 11 06/09/2018    CREATININE 0.4 06/09/2018    GFRAA >60 06/09/2018    LABGLOM >60 06/09/2018    GLUCOSE 98 06/09/2018    PROT 5.7 06/09/2018    LABALBU 2.5 06/09/2018    CALCIUM 7.2 06/09/2018    BILITOT 0.4 06/09/2018    ALKPHOS 54 06/09/2018    AST 17 06/09/2018    ALT 20 06/09/2018       FUNCTIONAL STATUS:     Cognition: WFL.    Speech:  WNL. ADLs and Self Care:  MAX A to Dependent. Bed Mobility:  Dependent. Transfers:   Dependent with sliding board. Gait:     Non Ambulatory due to Paraplegia. Stairs:    N/A.    ROM:          DIAGNOSES:    1.) non traumatic Spinal Cord Dysfunction. 2.) Metastatic Lung Cancer, T4 - T5 - T6.  3.) Status Post  Decompression Thoracic Laminectomy T3, T4, & T5.  4.) Paraplegia. 5.) Neurogenic Bladder. 6.) Neurogenic Bowel. 7.) Anemia. 8.) Protein Calorie Malnutrition. PLAN:    1.) Discontinue POCT Glucose. 2.) Follow up CBC for leucocytosis.   3.) Orthotist to adjust TLSO.  4.) Continue Rehab Program.

## 2018-06-11 NOTE — PROGRESS NOTES
descended NT NT  To be assessed To be assessed   Picking up object off the floor NT NT  To be assessed To be assessedt   BLE ROM PROM WNL's all aspects PROM WNL's all aspects      BLE Strength Trace Right LE PF/DF, Knee Ext, Hip Ext. Left LE 0/5 Left LE: hip 0/5, knee 0/5, ankle 1/5  Right LE: hip 1/5, knee 1/5, ankle 1/5  Increase Right LE strength to P-/P  Increase Left LE Strength to trace/P- Increase Right LE strength to P+/F-  Increase Left LE strength to P/P+   Balance  Seated static balance F+ with UE support Sitting: Min A at EOB  Standing: NT Sitting: Min A at EOB  Standing: dep at [employer] lift     Date Family Teach Completed NA  Pending improvement     Is additional Family Teaching Needed? Y or N yes Pending improvement      Hindering Progress LE weakness LE weakness LE weakness, sitting balance, activity tolerance     PT recommended ELOS 6 weeks       Team's Discharge Plan        Therapist at Team Meeting          Therapeutic Exercise:   AM: static sitting, dynamic sitting and weight shifting with flight elbow flexion bilateral directions laterally. Pina for balance with multiple balance loss episodes ant/posterior throughout session . PM: static standing at beni lift 2 x 5 minutes to weight bearing through bilateral LE/joint approximation/pressure relief. Patient education  Pt educated on body mechanics for sitting balance, technique with slide board transfers    Patient response to education:   Pt verbalized understanding Pt demonstrated skill Pt requires further education in this area   yes Partial with tactile/verbal cues yes     Additional Comments: Pt very motivated however limited in sitting balance and activity tolerance. In AM, sitting balance activites on mat table completed. Pt at times able to sit in midline unsupported however poor tolerance and able to hold for 5-10 seconds at a time before ant/post. Balance loss noted.  Attempted weight shifting onto extension elbow laterally with slight elbow flexion, Pina to return to midline. Slide board completed maxA x2 in bilateral directions. In PM, attempted weight bearing throughout bilateral LE with static standing at beni lift, completed 2 reps x 5 minutes as well for pressure relief/joint approximation/ROM with patient reporting comfort during activity. Pt propelling chair in AM and PM with bilateral UE with verbal cues for sequencing for turning, limited by fatigue. Skin was inspected: Grossly intact at ankles. Reviewed positioning in bed as well as posey boots when supine for heel relief. AM  Time in: 1045  Time out: 1130    PM  Time in: 1515  Time out: 1600    Pt is making limited progress toward established Physical Therapy goals. Continue with physical therapy current plan of care.     Mary Worthington DPT  [id number]

## 2018-06-11 NOTE — CARE COORDINATION
Social Work Assessment Summary    PCP: Rob    Patient Resides: Pt lives with [employer], spoue (76). There is 1 4-pound dog in the home. Home Architecture : Pt lives in a ranch style home. Main entrance has 2 steps and 0 rails. Pt has access to 2 bathrooms. One has a tub/shower combo with a shower curtain. The other has a walk-in shower with a shower curtain. Will you return to your own home? yes        Primary Caregiver: [employer], spouse (76) is a retired . Pt reported he is \"somewhat healthy. His B-12 is low. \"  Ab Bhagat does drive. Kath Mcdonough, son (46) works FT at [employer]. He is healthy and drives. Level of Function PTA : Pt was independent in all areas before admission. Employment: Pt is retired from working on the [employer] at [employer]. Receives SSD No Reason: n/a     DME Pta  : Pt reported she was supposed to have a wheelchair delivers \"a couple of weeks ago. \"  Pt said she just ordered a \"cheap wheelchair off [address], right before I this happened (admission). \"  Pt has and uses a wwalker and a bedside commode. The bedside commode is being used in conjunction with the standard toilet. Community Agency Involvement PTA : None     Do they have a medical profile: No    Family Teaching: TBS    Strength: \"Family\"     Preference: \"Get mobile again\"      NAME RELATION HOME # WORK # CELL #   Kath Mcdonough son Parul Limon  [phone]   [employer]  Hard of hearing* Spouse Ali Slight  [phone]   Sarah White daughter Janessa Half       Height: 4'11\"   Weight: 163 lbs    Pt reported her  was hard of hearing and Kenisha Haile (son) should be used as contact. Kenisha Haile will relay all information to Hermansvilletwin Bhagat (spouse).     Susannah LANDEROS Intern  Leola Gallo

## 2018-06-11 NOTE — PROGRESS NOTES
Physical Therapy    Facility/Department: [address] REHAB  Weekly Progress Note    Name: Margie Ruth  : 1945  MRN: [id number]    Date of Service: 2018    Evaluating Therapist: Scot Mims P.T.    ROOM: [address] [place] [address]  DIAGNOSIS: metastatic disease spine- s/p laminectomy T4-6  PRECAUTIONS: Fall Risk, soft TLSO & spinal precautions     Social:  Pt lives with  in a 1 floor plan 2 steps and 0 rails. Prior to admission: Independent PTA     Initial Evaluation  18 Short Term Goals Long Term Goals    Bed Mobility  Rolling: Min assist with bedrail  Supine to sit: dependent  Sit to supine: dependent  Scooting: dependent Rolling: cgA/Pina  Supine to sit: maxA x2  Sit to supine: Dep  Scooting: Dep Rolling: sba  Supine to sit: maxA  Sit to supine: maxA  Scooting: maxA Rolling: mod I  Supine to sit: modA  Sit to supine: modA  Scooting: modA   Transfers Sit to stand: Dependent  Sliding Board: Dependent  Stand to sit: Dependent  Stand pivot: Dependent Sit<>stand: dep  Stand pivot: dep   Slide board: maxA x2 Slide board: Mai Marts board:Pina   Ambulation   NT Patient unable NT  To be assessed as appropriate  To be assessed as appropriate   Walking 10 feet on uneven surface  NT patient unable NT  To be assessed as appropriate  To be assessed as appropriate   Wheel Chair Mobility Min assist with Bilat UE's X 10 feet 75'-125' Pina with bilateral UE  Modified Independent with bilat UE's X feet level surfaces 500'   Car Transfers NT   Max assist  Mod assist   Stair negotiation: ascended and descended                   NT   To be assessed as appropriate  To be assessed as appropriate   Curb Step:   ascended and descended NT  To be assessed To be assessed   BLE ROM PROM WNL's all aspects PROM WNL's all aspects     BLE Strength Trace Right LE PF/DF, Knee Ext, Hip Ext.   Left LE 0/5 Left LE: hip 0/5, knee 0/5, ankle 1/5  Right LE: hip 1/5, knee 1/5, ankle 1/5 Increase Right LE strength to P-/P  Increase

## 2018-06-11 NOTE — PROGRESS NOTES
Copy of disclosure statement and \"Privacy Act [address] Records\" left at patient's bedside as she is not in the room.

## 2018-06-12 NOTE — PROGRESS NOTES
OCCUPATIONAL THERAPY DAILY NOTE    Date:2018  Patient Name: Kenneth Gaytan  MRN: [id number]  : 1945  Room: 16/5516-A     Patient Active Problem List   Diagnosis    S/P hip replacement    Weakness of both legs    Metastatic cancer to spine [facility])    Spinal cord neoplasm       Precautions: fall risk, TLSO & spinal precautions. Functional Assessment:   Date Status AE  Comments   Feeding 18 Set up      Grooming 18 CGA/Min assist  Pt completed hair brushing while in bed and declined oral care until later. Pt able to apply deodorant while supine. Bathing 18  UB- set up to min assist  LB max assist  Pt needed min assist to wash back however set up washing chest/arms and stomach while in bed. UB Dressing 18 Dep/total- back brace  Min assist - shirt  Pt donned shirt needing assist to pull garment down over back. LB Dressing 18  dep/total Reacher  Sock aid Pt attempted using sock aid to flaquita grippers while in bed however unable to move either BLE. Homemaking          Functional Transfers / Balance:   Date Status DME  Comments   Sit Balance 18 Min A  Sitting balance while up in w/c to increase trunk control and strength. Stand Balance        [] Tub  [] Shower   Transfer   TBA     Commode   Transfer 18 Max A x2. Drop arm commode and transfer board    Functional   Mobility 18 Dep/total Sliding board transfer max A x2    Other: supine to EOB    Bed rolling      Bed scooting 18 Dep/total      Min/mod assist    Max assist        Pt completed rolled side to side in bed using bed rail at min/mod assist.      Functional Exercises / Activity:  BUE AROM ex's using 5# wt in alex box  to increase strength/endurance for ADL's and transfers tolerating 2 reps of 10 ea. Pt engaged in 2# dumbbell ex's for bicep curls, presses, shld punches for strengthening 2 reps of 10 ea.  Pt completed card game activity at table top while wearing

## 2018-06-12 NOTE — PATIENT CARE CONFERENCE
[address] [place] [address]  ACUTE REHABILITATION  TEAM CONFERENCE NOTE/PATIENT PLAN OF CARE    Date: 2018  Admission date: 2018  Patient Name: Sanjana Payer        MRN: [id number]    : 1945  (81 y.o.)  Gender: female   Rehab diagnosis/surgery with date:  Spinal cord dysfunction,        2018-  Decompressive Thoracic Laminectomy from T4 to T6    Impairment Group Code:  4.130      MEDICAL/FUNCTIONAL HISTORY/STATUS:  History of lung cancer on active chemotherapy, Had back pain with gradual weakness and numbness. CT revealed mets to spine. Paraplegia. Had ileus post op. Spinal cord compressiom, neurogenic bowel and bladder.     Consultations/Labs/X-rays: 18 WBC 12.1 H&H 10.9/34.9      MEDICATION UPDATE:  Reglan stopped      NURSING FIMS:    Bowel:   Current level: Supervision  - has had 1 accident  Short term bowel goal:  Modified Independent   Long term bowel goal: Modified Independent     Bladder:   Current level: Dependent   Short term bladder goal: Dependent   Long term bladder goal: Dependent     Toilet Hygiene:   Current level : Supervision   Short term Toilet hygiene goal: Supervision   Long term toilet hygiene goal:  Modified Independent     Skin integrity: Mid upper back incision healing, open to air, sutures to come out today, bilateral knees reddened  Pain: Mid back post surgical pain ranges 8-0, percocet is effective    NUTRITION    Diet  General, taking % of meals, suppliment  Liquid consistency   thin    SOCIAL INFORMATION:  Lives with: spouse, Rod Dear  Prior community services:  None noted  Home Architecture:  Ranch style with 2 entry steps no rails  Prior Level of function:  independent  DME:  Wheeled walker, bedside commode    FAMILY / PATIENT EDUCATION:  To be done daily    PHYSICAL THERAPY    Bed mobility:   Current level: Dependent   Short term bed mobility goal: Max Assist   Long term bed mobility goal: Moderate Assist     Chair/bed transfers:  Current level: extremities, supportive family  Factors hindering goal achievement:  Poor strength in bilateral lower extremities, poor sitting balance at present, depression    Discharge Plan   Estimated Length of Stay: 4 weeks            Destination: home   Services at Discharge: to be determined  Equipment at Discharge: wheelchair, ramp      INTERDISCIPLINARY TEAM/PHYSICIAN RECOMMENDATION AND/OR REVISIONS OF PLAN OF CARE:  Sitting balance, transfer board or beasy board transfers,  improve functional ADL's, wheelchair independence, bowel and bladder training, patient and family education. , psychology consult for coping mechanisms and adjustment    I approve the established interdisciplinary plan of care as documented within the medical record of Sahara Zhang. Please see team conference signature page for those in attendance.     Electronically signed by Phuong Price RN on 6/12/2018 at 7:18 AM

## 2018-06-13 NOTE — PROGRESS NOTES
precautions during UE dressing and transfers       [x] Family teach completed on: 6/13/18 Pt with son and  present for therapy session. Educated with regards to donning TLSO supine in bed,  LE dressing techniques supine in bed for EC/WS techniques, clothing management with regards to toileting when using francis to transfer to commode. Pain Level: 6-7/10 back     Additional Notes:       Patient has made fair   progress during treatment sessions toward set goals. Therapy emphasis to obtain goals:  Balance, transfers, ADL's, strength/endurance. Functional mobility, wheel chair mobility, positioning, safety education and home mgmt. [x] Continue with current OT Plan of care. [] Prepare for Discharge     DISCHARGE RECOMMENDATIONS  Recommended DME:  Drop arm 3 in 1. Post Discharge Care:   []Home Independently  [x]Home with 24hr Care / Supervision []Home with Partial Supervision []Home with Home Health OT []Home with Out Pt OT []Other: ___   Comments:         Time in Time out Tx Time Breakdown  Variance:   First Session  [id number] [x] Individual Tx-45 mins   [] Concurrent Tx - mins  [] Co-Tx -   [] Group Tx -   [] Time Missed -     Second Session [id number] [] Individual Tx-   [x] Concurrent Tx -30 mins  [] Co-Tx -   [] Group Tx -   [x] Time Missed - 15 min Bathroom needs    Third Session    [] Individual Tx-   [] Concurrent Tx -  [] Co-Tx -   [] Group Tx -   [] Time Missed -         Total Tx Time 75  mins     [address] [facility] [address]  I have read the above note and agree with the documentation.   Miguel Burris OTR/L [id number]

## 2018-06-13 NOTE — PROGRESS NOTES
Julian Ambrocio is a 81 y.o. female patient. Current Facility-Administered Medications   Medication Dose Route Frequency Provider Last Rate Last Dose    ondansetron (ZOFRAN) injection 4 mg  4 mg Intravenous Q6H PRN [facility] [place], DO        lactulose (CHRONULAC) 10 GM/15ML solution 20 g  20 g Oral TID Tash [place], DO   Stopped at 06/12/18 2010    oxyCODONE-acetaminophen (PERCOCET) 5-325 MG per tablet 1 tablet  1 tablet Oral Q4H PRN Tash [place], DO   1 tablet at 06/13/18 0256    pantoprazole (PROTONIX) tablet 40 mg  40 mg Oral QAM AC [facility], DO   40 mg at 06/13/18 0701    polyethylene glycol (GLYCOLAX) packet 17 g  17 g Oral Daily [facility], DO   17 g at 06/12/18 0831    sennosides-docusate sodium (SENOKOT-S) 8.6-50 MG tablet 2 tablet  2 tablet Oral Daily PRN Tash [place], DO   2 tablet at 06/11/18 1730    therapeutic multivitamin-minerals 1 tablet  1 tablet Oral Daily [facility], DO   1 tablet at 06/12/18 0831    vitamin B and C (TOTAL B-C) 1 tablet  1 tablet Oral Daily [facility], DO   1 tablet at 06/12/18 0831    vitamin D tablet 2,000 Units  2,000 Units Oral Daily [facility], DO   2,000 Units at 06/12/18 0831    acetaminophen (TYLENOL) tablet 650 mg  650 mg Oral TID PRN Radha Huizar MD        enoxaparin (LOVENOX) injection 40 mg  40 mg Subcutaneous Daily Emma Harmon MD   40 mg at 06/12/18 0831     Allergies   Allergen Reactions    Other Other (See Comments)     Seasonal allergies, cough and sinus drainage, sneeze     Active Problems:    Spinal cord neoplasm  Resolved Problems:    * No resolved hospital problems. *    Blood pressure 120/62, pulse 86, temperature 98.2 °F (36.8 °C), temperature source Temporal, resp. rate 14, height 4' 11\" (1.499 m), weight 163 lb 1.6 oz (74 kg), SpO2 97 %, not currently breastfeeding. Subjective:  Symptoms:  Stable. She reports weakness. Diet:  Adequate intake.     Activity level: Impaired due to weakness. Pain:  She complains of pain that is mild. Objective:  General Appearance: In no acute distress. Vital signs: (most recent): Blood pressure 120/62, pulse 86, temperature 98.2 °F (36.8 °C), temperature source Temporal, resp. rate 14, height 4' 11\" (1.499 m), weight 163 lb 1.6 oz (74 kg), SpO2 97 %, not currently breastfeeding. Vital signs are normal.    Output: Producing urine and producing stool. Lungs:  Normal effort and normal respiratory rate. Breath sounds clear to auscultation. Heart: Normal rate. Regular rhythm. S1 normal and S2 normal.    Abdomen: Abdomen is soft. Bowel sounds are normal.   There is no abdominal tenderness. Extremities: Normal range of motion. Neurological: Patient is alert. (Paraplegic). :      Date   Status   AE    Comments     Feeding   6/9/18   Set up              Grooming   6/12/18   CGA/Min assist       Pt completed hair brushing while in bed and declined oral care until later. Pt able to apply deodorant while supine. Bathing   6/12/18    UB- set up to min assist    LB max assist     Pt needed min assist to wash back however set up washing chest/arms and stomach while in bed. UB Dressing   6/12/18   Dep/total- back brace    Min assist - shirt     Pt donned shirt needing assist to pull garment down over back. LB Dressing   6/12/18    dep/total   Reacher    Sock aid Pt attempted using sock aid to flaquita grippers while in bed however unable to move either BLE. Homemaking                          Functional Transfers / Balance:      Date Status DME  Comments   Sit Balance 6/12/18 Min A   Sitting balance while up in w/c to increase trunk control and strength. Stand Balance           [] Tub  [] Shower   Transfer    TBA       Commode   Transfer 6/11/18 Max A x2.   Drop arm commode and transfer board     Functional   Mobility 6/9/18 Dep/total Sliding board transfer max A x2     Other: supine to EOB     Bed rolling        Bed scooting 6/9/18 6/12/18 6/9/18 Dep/total        Min/mod assist     Max assist            Pt completed rolled side to side in bed using bed rail at min/mod assist.       Functional Exercises / Activity:  BUE AROM ex's using 5# wt in alex box  to increase strength/endurance for ADL's and transfers tolerating 2 reps of 10 ea. Pt engaged in 2# dumbbell ex's for bicep curls, presses, shld punches for strengthening 2 reps of 10 ea. Pt completed card game activity at table top while wearing 1# wrist wts to improve strength for functional activities and tolerated well. Pt engaged in am ADL's to increase independence with dressing and bathing tasks.        Assessment:    Condition: In stable condition. Improving. (Spinal cord tumor). Plan:   Encourage ambulation. (Tolerating therapy  Working on trunk strength  Limited use of lowers  Neurogenic bowel and bladder).        Alicia Webber MD  6/13/2018

## 2018-06-13 NOTE — PLAN OF CARE
Problem: Falls - Risk of:  Goal: Will remain free from falls  Will remain free from falls   Outcome: Met This Shift    Goal: Absence of physical injury  Absence of physical injury   Outcome: Met This Shift      Problem: Risk for Impaired Skin Integrity  Goal: Tissue integrity - skin and mucous membranes  Structural intactness and normal physiological function of skin and  mucous membranes. Outcome: Met This Shift      Problem:  Activity:  Goal: Ability to avoid complications of mobility impairment will improve  Ability to avoid complications of mobility impairment will improve   Outcome: Met This Shift    Goal: Ability to tolerate increased activity will improve  Ability to tolerate increased activity will improve   Outcome: Met This Shift      Problem: Nutritional:  Goal: Ability to attain and maintain optimal nutritional status will improve  Ability to attain and maintain optimal nutritional status will improve   Outcome: Met This Shift      Problem: Respiratory:  Goal: Respiratory status will improve  Respiratory status will improve   Outcome: Met This Shift      Problem: Skin Integrity:  Goal: Risk for impaired skin integrity will decrease  Risk for impaired skin integrity will decrease   Outcome: Met This Shift

## 2018-06-14 NOTE — PLAN OF CARE
Problem: Falls - Risk of:  Goal: Will remain free from falls  Will remain free from falls   Outcome: Met This Shift    Goal: Absence of physical injury  Absence of physical injury   Outcome: Met This Shift      Problem: Risk for Impaired Skin Integrity  Goal: Tissue integrity - skin and mucous membranes  Structural intactness and normal physiological function of skin and  mucous membranes. Outcome: Met This Shift      Problem: Activity:  Goal: Ability to avoid complications of mobility impairment will improve  Ability to avoid complications of mobility impairment will improve   Outcome: Ongoing    Goal: Ability to tolerate increased activity will improve  Ability to tolerate increased activity will improve   Outcome: Ongoing      Problem:  Bowel/Gastric:  Goal: Gastrointestinal status for postoperative course will improve  Gastrointestinal status for postoperative course will improve   Outcome: Ongoing      Problem: Fluid Volume:  Goal: Maintenance of adequate hydration will improve  Maintenance of adequate hydration will improve   Outcome: Met This Shift      Problem: Health Behavior:  Goal: Identification of resources available to assist in meeting health care needs will improve  Identification of resources available to assist in meeting health care needs will improve   Outcome: Ongoing      Problem: Nutritional:  Goal: Ability to attain and maintain optimal nutritional status will improve  Ability to attain and maintain optimal nutritional status will improve   Outcome: Ongoing      Problem: Physical Regulation:  Goal: Ability to maintain clinical measurements within normal limits will improve  Ability to maintain clinical measurements within normal limits will improve   Outcome: Met This Shift    Goal: Will remain free from infection  Will remain free from infection   Outcome: Met This Shift      Problem: Respiratory:  Goal: Respiratory status will improve  Respiratory status will improve   Outcome: Met This

## 2018-06-14 NOTE — PLAN OF CARE
Met This Shift    Goal: Will remain free from infection  Will remain free from infection   Outcome: Met This Shift      Problem: Respiratory:  Goal: Respiratory status will improve  Respiratory status will improve   Outcome: Met This Shift      Problem: Sensory:  Goal: Pain level will decrease  Pain level will decrease    Outcome: Met This Shift    Goal: Satisfaction with pain management regimen will improve  Satisfaction with pain management regimen will improve   Outcome: Met This Shift      Problem: Skin Integrity:  Goal: Risk for impaired skin integrity will decrease  Risk for impaired skin integrity will decrease   Outcome: Met This Shift      Problem: Urinary Elimination:  Goal: Ability to achieve and maintain adequate urine output will improve  Ability to achieve and maintain adequate urine output will improve   Outcome: Met This Shift      Problem: Pain:  Goal: Control of acute pain  Control of acute pain   Outcome: Met This Shift    Goal: Control of chronic pain  Control of chronic pain   Outcome: Met This Shift    Goal: Pain level will decrease  Pain level will decrease    Outcome: Met This Shift

## 2018-06-14 NOTE — PROGRESS NOTES
Abelardo Thomas is a 81 y.o. female patient. Current Facility-Administered Medications   Medication Dose Route Frequency Provider Last Rate Last Dose    ondansetron (ZOFRAN) injection 4 mg  4 mg Intravenous Q6H PRN Jared Ramiro [place], DO        lactulose (CHRONULAC) 10 GM/15ML solution 20 g  20 g Oral TID Johnathan Mejia DO   Stopped at 06/12/18 2010    oxyCODONE-acetaminophen (PERCOCET) 5-325 MG per tablet 1 tablet  1 tablet Oral Q4H PRN Johnathan Mejia, DO   1 tablet at 06/14/18 0223    pantoprazole (PROTONIX) tablet 40 mg  40 mg Oral QAM AC Jared Ramiro [place], DO   40 mg at 06/14/18 0705    polyethylene glycol (GLYCOLAX) packet 17 g  17 g Oral Daily [facility] [place], DO   17 g at 06/13/18 0950    sennosides-docusate sodium (SENOKOT-S) 8.6-50 MG tablet 2 tablet  2 tablet Oral Daily PRN Johnathan Mejia, DO   2 tablet at 06/11/18 1730    therapeutic multivitamin-minerals 1 tablet  1 tablet Oral Daily [facility] [place], DO   1 tablet at 06/13/18 0858    vitamin B and C (TOTAL B-C) 1 tablet  1 tablet Oral Daily Jared Ramiro [place], DO   1 tablet at 06/13/18 0858    vitamin D tablet 2,000 Units  2,000 Units Oral Daily Jared Ramiro [place], DO   2,000 Units at 06/13/18 0858    acetaminophen (TYLENOL) tablet 650 mg  650 mg Oral TID PRN Aria Larose MD        enoxaparin (LOVENOX) injection 40 mg  40 mg Subcutaneous Daily Emma Leo MD   40 mg at 06/13/18 0950     Allergies   Allergen Reactions    Other Other (See Comments)     Seasonal allergies, cough and sinus drainage, sneeze     Active Problems:    Spinal cord neoplasm  Resolved Problems:    * No resolved hospital problems. *    Blood pressure 124/75, pulse 86, temperature 98.2 °F (36.8 °C), temperature source Temporal, resp. rate 18, height 4' 11\" (1.499 m), weight 163 lb 1.6 oz (74 kg), SpO2 97 %, not currently breastfeeding. Subjective:  Symptoms:  Stable. She reports weakness. Diet:  Adequate intake.     Activity level:

## 2018-06-14 NOTE — PROGRESS NOTES
Physical Therapy    Facility/Department: [address] REHAB  Daily Treatment Note    Name: Beatrice Gan  : 1945  MRN: [id number]    Date of Service: 2018    Evaluating Therapist: Maritza Arellano P.T.    ROOM: [address] [place] [address]  DIAGNOSIS: metastatic disease spine- s/p laminectomy T4-6 (2018)  PRECAUTIONS: Fall Risk, soft TLSO & spinal precautions     Social:  Pt lives with  in a 1 floor plan 2 steps and 0 rails. Prior to admission: Independent PTA     Initial Evaluation  18 AM     PM    Short Term Goals Long Term Goals    Was pt agreeable to Eval/treatment? 2018 Yes yes     Does pt have pain?  8/10 back pain 4/10 back pain 4/10 back pain     Bed Mobility  Rolling: Min assist with bedrail  Supine to sit: dependent  Sit to supine: dependent  Scooting: dependent Rolling: NT  Supine to sit: NT  Sit to supine: NT  Scooting: Dependent Rolling: Pina  Supine to sit: maxA x2  Sit to supine: maxA x2  Scooting: Dependent Rolling: sba  Supine to sit: maxA  Sit to supine: maxA  Scooting: maxA Rolling: mod I  Supine to sit: modA  Sit to supine: modA  Scooting: modA   Transfers Sit to stand: Dependent  Sliding Board: Dependent  Stand to sit: Dependent  Stand pivot: Dependent Sit<>stand: dep  Stand pivot: NT  Slide board: maxA x2 with beasy board Sit<>stand: NT  Stand pivot: NT  Slide board: maxA x2 with b-easy board completed mat to and from w/c twice Slide board: modA Slide board:Pina   Ambulation   NT Patient unable NT   To be assessed as appropriate  To be assessed as appropriate   Walking 10 feet on uneven surface  NT patient unable NT   To be assessed as appropriate  To be assessed as appropriate   Wheel Chair Mobility Min assist with Bilat UE's X 10 feet 125' x2 with bilateral UE sba NT  Modified Independent with bilat UE's X feet level surfaces 500'   Car Transfers NT  NT  Max assist  Mod assist   Stair negotiation: ascended and descended                   NT NT   To be assessed as appropriate

## 2018-06-15 NOTE — PLAN OF CARE
Problem: Nutrition  Goal: Optimal nutrition therapy  Outcome: Ongoing  Nutrition Problem: Inadequate oral intake  Intervention: Food and/or Nutrient Delivery: Continue current diet, Continue current ONS  Nutritional Goals: consume 75% or more of most meals/ONS

## 2018-06-16 NOTE — PLAN OF CARE
Problem: Falls - Risk of:  Goal: Will remain free from falls  Will remain free from falls   Outcome: Met This Shift    Goal: Absence of physical injury  Absence of physical injury   Outcome: Met This Shift      Problem: Risk for Impaired Skin Integrity  Goal: Tissue integrity - skin and mucous membranes  Structural intactness and normal physiological function of skin and  mucous membranes. Outcome: Met This Shift      Problem: Activity:  Goal: Ability to avoid complications of mobility impairment will improve  Ability to avoid complications of mobility impairment will improve   Outcome: Met This Shift    Goal: Ability to tolerate increased activity will improve  Ability to tolerate increased activity will improve   Outcome: Met This Shift      Problem:  Bowel/Gastric:  Goal: Gastrointestinal status for postoperative course will improve  Gastrointestinal status for postoperative course will improve   Outcome: Met This Shift      Problem: Fluid Volume:  Goal: Maintenance of adequate hydration will improve  Maintenance of adequate hydration will improve   Outcome: Met This Shift      Problem: Health Behavior:  Goal: Identification of resources available to assist in meeting health care needs will improve  Identification of resources available to assist in meeting health care needs will improve   Outcome: Met This Shift    Goal: Ability to state signs and symptoms to report to health care provider will improve  Ability to state signs and symptoms to report to health care provider will improve   Outcome: Met This Shift      Problem: Nutritional:  Goal: Ability to attain and maintain optimal nutritional status will improve  Ability to attain and maintain optimal nutritional status will improve   Outcome: Met This Shift      Problem: Physical Regulation:  Goal: Ability to maintain clinical measurements within normal limits will improve  Ability to maintain clinical measurements within normal limits will improve   Outcome: Met This Shift    Goal: Will remain free from infection  Will remain free from infection   Outcome: Met This Shift      Problem: Respiratory:  Goal: Respiratory status will improve  Respiratory status will improve   Outcome: Met This Shift      Problem: Sensory:  Goal: Pain level will decrease  Pain level will decrease    Outcome: Met This Shift    Goal: Satisfaction with pain management regimen will improve  Satisfaction with pain management regimen will improve   Outcome: Met This Shift      Problem: Skin Integrity:  Goal: Risk for impaired skin integrity will decrease  Risk for impaired skin integrity will decrease   Outcome: Met This Shift      Problem: Urinary Elimination:  Goal: Ability to achieve and maintain adequate urine output will improve  Ability to achieve and maintain adequate urine output will improve   Outcome: Met This Shift      Problem: Pain:  Goal: Control of acute pain  Control of acute pain   Outcome: Met This Shift    Goal: Control of chronic pain  Control of chronic pain   Outcome: Met This Shift    Goal: Pain level will decrease  Pain level will decrease    Outcome: Met This Shift

## 2018-06-17 NOTE — PROGRESS NOTES
OCCUPATIONAL THERAPY DAILY NOTE    Date:2018  Patient Name: Bart Tony  MRN: [id number]  : 1945  Room: 16/16-A     Patient Active Problem List   Diagnosis    S/P hip replacement    Weakness of both legs    [facility])    Spinal cord neoplasm       Precautions: fall risk, TLSO & spinal precautions. Functional Assessment:   Date Status AE  Comments   Feeding 18 Set up      Grooming 18 CGA/Min assist     Bathing 18  UB- set up to min assist  LB max assist     UB Dressing 18 Dep/total- back brace  S- shirt  Pt donned/doffed t- shirt seated up in w/c however dep/total assist to flaquita back brace. LB Dressing 18  dep/total Reacher  Sock aid     Homemaking 18 mod assist w/c level and reacher Pt completed graded kitchen mobility training for item retrieval from refrigerator/pantry. Pt able to propel w/c in the kitchen however assist to align w/c to reach objects/doors. Pt filled up coffee creamer container from pantry then transported via w/c propel over to countertop by coffee machine. Functional Transfers / Balance:   Date Status DME  Comments   Sit Balance 18 Min A  Sitting balance completed while in w/c and during item retrieval/reacher use in kitchen. Stand Balance        [] Tub  [] Shower   Transfer   TBA     Commode   Transfer 18 Mod A x2 Drop arm commode and transfer board     Functional   Mobility 18 mod assist    Func mobility at w/c level while retrieving kitchen items. Other: supine to EOB    Bed rolling      Bed scooting    Bed to w/c  18 Dep/total      Min/mod assist    Max assist    Dependent                 Rene lift        Pt required min/mod assist in bed rolling to place rene lift pad and flaquita back brace. Rene lift utilized for patient safety during bed to w/c transfers.   Pt unable to assist with repositioning self back into w/c needing significant assistance x2. Functional Exercises / Activity:  BUE ROM ex's using 3# dowel gricelda to increase strength/endurance for ADL's and transfers. Pt tolerated 20-25 reps with  shld punches, circles, bicep curls and presses following spinal precautions. Sensory / Neuromuscular Re-Education:      Cognitive Skills:   Status Comments   Problem   Solving fair    Memory fair    Sequencing fair    Safety fair      Visual Perception:    Education:  Pt educated with w/c level kitchen mobility/hmkg training to increase skills. [x] Family teach completed on: 6/13/18 Pt with son and  present for therapy session. Educated with regards to donning TLSO supine in bed,  LE dressing techniques supine in bed for EC/WS techniques, clothing management with regards to toileting when using francis to transfer to commode. 6/14/18 with son in law and  of patient. Family observed commode transfer with transfer board. Discussed ADL levels and safety and DME for home. Pain Level: 6-7/10  back     Additional Notes:       Patient has made fair   progress during treatment sessions toward set goals. Therapy emphasis to obtain goals:  Balance, transfers, ADL's, strength/endurance. Functional mobility, wheel chair mobility, positioning, safety education and home mgmt. [x] Continue with current OT Plan of care. [] Prepare for Discharge     DISCHARGE RECOMMENDATIONS  Recommended DME:  Drop arm 3 in 1.     Post Discharge Care:   []Home Independently  [x]Home with 24hr Care / Supervision []Home with Partial Supervision []Home with Home Health OT []Home with Out Pt OT []Other: ___   Comments:         Time in Time out Tx Time Breakdown  Variance:   First Session  10:31am 11:16am [x] Individual Tx-45min   [] Concurrent Tx - mins  [] Co-Tx -   [] Group Tx -   [] Time Missed -     Second Session   [] Individual Tx-   [] Concurrent Tx - mins  [] Co-Tx -   [] Group Tx -   [] Time Missed -     Third Session    [] Individual

## 2018-06-17 NOTE — PROGRESS NOTES
Picking up object off the floor NT NT   To be assessed To be assessedt   BLE ROM PROM WNL's all aspects PROM WNL's all aspects         BLE Strength Trace Right LE PF/DF, Knee Ext, Hip Ext. Left LE 0/5 Left LE: hip 0/5, knee 1/5, ankle 1/5  Right LE: hip 1/5, knee 2+/5, ankle 1/5  Increase Right LE strength to P-/P  Increase Left LE Strength to trace/P- Increase Right LE strength to P+/F-  Increase Left LE strength to P/P+   Balance  Seated static balance F+ with UE support Sitting: Min A at EOB  Standing: dep        Date Family Teach Completed NA /son present:  6/13, 6/14, 6/15        Is additional Family Teaching Needed? Y or N yes Pending improvement        Hindering Progress LE weakness LE weakness, sitting balance, activity tolerance        PT recommended ELOS 6 weeks          Team's Discharge Plan             Therapist at Team Meeting               Therapeutic Exercise:   Seated reaching forward to touch therapist hand followed by return to neutral x 10 reps L and R  Seated B hip adduction to squeeze ball 2 x 10 reps  Seated B hip abduction isometric x 12 reps  Seated AAROM hip flexion followed by gravity assisted hip extension x 10 reps BLE    Patient education  Pt educated on safe hand placement with sliding board transfer    Patient response to education:   Pt verbalized understanding Pt demonstrated skill Pt requires further education in this area   yes partial yes     Additional Comments: Blue theraband pads utilized for BUE suppport to maintain static sitting balance at edge of mat table. Pt continues to exhibit poor trunk control with static/dynamic seated activities. Pt exhibits ability to participate with seated TE with RLE better than LLE. Pt denied significant complaints during session, returned to room and left in [facility] following session with call light within reach and all needs met.        AM  Time in: 1125  Time out: 1155      Pt is making good progress toward established Physical Therapy goals. Continue with physical therapy current plan of care.     Heena Damico, PT, DPT  [id number]

## 2018-06-17 NOTE — PROGRESS NOTES
Piseco Phenes, DO, 1 tablet at 06/17/18 1203    pantoprazole (PROTONIX) tablet 40 mg, 40 mg, Oral, QAM AC, Jared Winfred Demetra, DO, 40 mg at 06/17/18 0719    polyethylene glycol (GLYCOLAX) packet 17 g, 17 g, Oral, Daily, Jared Magaña Demetra, DO, 17 g at 06/17/18 0949    sennosides-docusate sodium (SENOKOT-S) 8.6-50 MG tablet 2 tablet, 2 tablet, Oral, Daily PRN, Piseco Phenes, DO, 2 tablet at 06/11/18 1730    therapeutic multivitamin-minerals 1 tablet, 1 tablet, Oral, Daily, Jared Winfred Demetra, DO, 1 tablet at 06/17/18 0096    vitamin B and C (TOTAL B-C) 1 tablet, 1 tablet, Oral, Daily, Jared Winfred Demetra, DO, 1 tablet at 06/17/18 1419    vitamin D tablet 2,000 Units, 2,000 Units, Oral, Daily, Jared Winfred Demetra, DO, 2,000 Units at 06/17/18 8321    acetaminophen (TYLENOL) tablet 650 mg, 650 mg, Oral, TID PRN, Netta Lemus MD    enoxaparin (LOVENOX) injection 40 mg, 40 mg, Subcutaneous, Daily, Emma Keys MD, 40 mg at 06/17/18 4419    Assessment:    Patient Active Problem List   Diagnosis    S/P hip replacement    Weakness of both legs    Metastatic cancer to spine [facility])    Spinal cord neoplasm       Plan:  82 yo woman with metastatic lung cancer currently on Keytruda with improvement of disease that developed sudden severe back pain and neurologic syptoms. Found to have cord compression and underwent spinal decompresision surgery  Pt had sob, cough and congestion. Mostly bedbound. CXR concerning for pneumonia and elevated WBC. -Start Rocephin fo treatment pneumonia.  incentive spirometry and nebs  -cont PT      Shari Denise  2:41 PM  6/17/2018

## 2018-06-17 NOTE — PLAN OF CARE
Problem: Falls - Risk of:  Goal: Will remain free from falls  Will remain free from falls   Outcome: Met This Shift    Goal: Absence of physical injury  Absence of physical injury   Outcome: Met This Shift      Problem: Risk for Impaired Skin Integrity  Goal: Tissue integrity - skin and mucous membranes  Structural intactness and normal physiological function of skin and  mucous membranes. Outcome: Met This Shift      Problem: Activity:  Goal: Ability to avoid complications of mobility impairment will improve  Ability to avoid complications of mobility impairment will improve   Outcome: Met This Shift    Goal: Ability to tolerate increased activity will improve  Ability to tolerate increased activity will improve   Outcome: Met This Shift      Problem:  Bowel/Gastric:  Goal: Gastrointestinal status for postoperative course will improve  Gastrointestinal status for postoperative course will improve   Outcome: Met This Shift      Problem: Fluid Volume:  Goal: Maintenance of adequate hydration will improve  Maintenance of adequate hydration will improve   Outcome: Met This Shift      Problem: Health Behavior:  Goal: Identification of resources available to assist in meeting health care needs will improve  Identification of resources available to assist in meeting health care needs will improve   Outcome: Met This Shift    Goal: Ability to state signs and symptoms to report to health care provider will improve  Ability to state signs and symptoms to report to health care provider will improve   Outcome: Met This Shift      Problem: Nutritional:  Goal: Ability to attain and maintain optimal nutritional status will improve  Ability to attain and maintain optimal nutritional status will improve   Outcome: Met This Shift      Problem: Physical Regulation:  Goal: Ability to maintain clinical measurements within normal limits will improve  Ability to maintain clinical measurements within normal limits will improve   Outcome: Met This Shift    Goal: Will remain free from infection  Will remain free from infection   Outcome: Met This Shift      Problem: Respiratory:  Goal: Respiratory status will improve  Respiratory status will improve   Outcome: Met This Shift      Problem: Sensory:  Goal: Pain level will decrease  Pain level will decrease    Outcome: Met This Shift      Problem: Skin Integrity:  Goal: Risk for impaired skin integrity will decrease  Risk for impaired skin integrity will decrease   Outcome: Met This Shift      Problem: Urinary Elimination:  Goal: Ability to achieve and maintain adequate urine output will improve  Ability to achieve and maintain adequate urine output will improve   Outcome: Met This Shift      Problem: Pain:  Goal: Control of acute pain  Control of acute pain   Outcome: Met This Shift    Goal: Control of chronic pain  Control of chronic pain   Outcome: Met This Shift    Goal: Pain level will decrease  Pain level will decrease    Outcome: Met This Shift

## 2018-06-18 NOTE — PROGRESS NOTES
Kenneth Gaytan is a 81 y.o. female patient.     Current Facility-Administered Medications   Medication Dose Route Frequency Provider Last Rate Last Dose    cefTRIAXone (ROCEPHIN) 1 g in sterile water 10 mL IV syringe  1 g Intravenous Q24H Shari Trimble MD   1 g at 06/17/18 1556    sodium chloride flush 0.9 % injection 10 mL  10 mL Intravenous PRN Lalit Pinto MD        sodium chloride flush 0.9 % injection 10 mL  10 mL Intravenous BID Lalit Pinto MD   10 mL at 06/17/18 2107    ipratropium-albuterol (DUONEB) nebulizer solution 1 ampule  1 ampule Inhalation 4x daily Lalit Pinto MD   1 ampule at 06/17/18 2145    lactulose (CHRONULAC) 10 GM/15ML solution 10 g  10 g Oral Daily Kennedy BETTIE Jones MD        fluticasone (FLONASE) 50 MCG/ACT nasal spray 1 spray  1 spray Each Nare Daily Kennedy A MD Karen   1 spray at 06/16/18 0915    ondansetron (ZOFRAN) injection 4 mg  4 mg Intravenous Q6H PRN Jared Cardona,         oxyCODONE-acetaminophen (PERCOCET) 5-325 MG per tablet 1 tablet  1 tablet Oral Q4H PRN Jaiden Ray DO   1 tablet at 06/18/18 0405    pantoprazole (PROTONIX) tablet 40 mg  40 mg Oral QAM AC Jared Cardona, DO   40 mg at 06/18/18 0655    polyethylene glycol (GLYCOLAX) packet 17 g  17 g Oral Daily Jared Cardona, DO   17 g at 06/17/18 1034    sennosides-docusate sodium (SENOKOT-S) 8.6-50 MG tablet 2 tablet  2 tablet Oral Daily PRN Jaiden Ray DO   2 tablet at 06/11/18 1730    therapeutic multivitamin-minerals 1 tablet  1 tablet Oral Daily Jared Cardona, DO   1 tablet at 06/17/18 3605    vitamin B and C (TOTAL B-C) 1 tablet  1 tablet Oral Daily Jared Cardona, DO   1 tablet at 06/17/18 7984    vitamin D tablet 2,000 Units  2,000 Units Oral Daily Jared Cardona DO   2,000 Units at 06/17/18 2700    acetaminophen (TYLENOL) tablet 650 mg  650 mg Oral TID PRN Sanjana Johns MD        enoxaparin (LOVENOX) injection 40 mg  40 mg Subcutaneous

## 2018-06-18 NOTE — PROGRESS NOTES
Perception:    Education:  Pt educated         [x] Family teach completed on: 6/13/18 Pt with son and  present for therapy session. Educated with regards to donning TLSO supine in bed,  LE dressing techniques supine in bed for EC/WS techniques, clothing management with regards to toileting when using francis to transfer to commode. 6/14/18 with son in law and  of patient. Family observed commode transfer with transfer board. Discussed ADL levels and safety and DME for home. Pain Level: 6-7/10  back     Additional Notes:       Patient has made fair   progress during treatment sessions toward set goals. Therapy emphasis to obtain goals:  Balance, transfers, ADL's, strength/endurance. Functional mobility, wheel chair mobility, positioning, safety education and home mgmt. [x] Continue with current OT Plan of care. [] Prepare for Discharge     DISCHARGE RECOMMENDATIONS  Recommended DME:  Drop arm 3 in 1. Post Discharge Care:   []Home Independently  [x]Home with 24hr Care / Supervision []Home with Partial Supervision []Home with Home Health OT []Home with Out Pt OT []Other: ___   Comments:         Time in Time out Tx Time Breakdown  Variance:   First Session  [id number] [] Individual Tx-   [x] Concurrent Tx - 45 mins  [] Co-Tx -   [] Group Tx -   [] Time Missed -     Second Session [id number] [] Individual Tx-   [] Concurrent Tx - 40 mins  [] Co-Tx -   [] Group Tx -   [] Time Missed -     Third Session    [] Individual Tx-   [] Concurrent Tx -  [] Co-Tx -   [] Group Tx -   [] Time Missed -         Total Tx Time 85  mins     Yogi Shannon Rising  [place]/L [id number]  I have read the above note and agree with the documentation.   Shaan Bloom OTR/L [id number]

## 2018-06-18 NOTE — PROGRESS NOTES
Physical Therapy  Facility/Department: [address] [facility]  Daily Treatment Note  NAME: Lizbet Michael  : 1945  MRN: [id number]    Date of Service: 2018     Evaluating Therapist: Merlin Brake, P.T.     ROOM: 2640/7210-L  DIAGNOSIS: metastatic disease spine- s/p laminectomy T4-6 (2018)  PRECAUTIONS: Fall Risk, soft TLSO & spinal precautions, PRAFO in bed and when up     Social:  Pt lives with  in a 1 floor plan 2 steps and 0 rails. Prior to admission: Independent PTA       Initial Evaluation  18 AM     PM    Short Term Goals Long Term Goals    Was pt agreeable to Eval/treatment? 2018 Yes yes       Does pt have pain?  8/10 back pain Mild c/o back pain with activity 3/10 back pain       Bed Mobility  Rolling: Min assist with bedrail  Supine to sit: dependent  Sit to supine: dependent  Scooting: dependent Rolling: NT  Supine to sit: NT  Sit to supine: NT  Scooting: Dependent NT as pt reporting difficulty breathing in supine  Rolling: sba  Supine to sit: maxA  Sit to supine: maxA  Scooting: maxA Rolling: mod I  Supine to sit: modA  Sit to supine: modA  Scooting: modA   Transfers Sit to stand: Dependent  Sliding Board: Dependent  Stand to sit: Dependent  Stand pivot: Dependent Sit<>stand: NT  Stand pivot: NT  Slide board: Ana Reyes x2 with beasy board (w/c to and from mat table) Sit<>stand: NT  Stand pivot: NT  Slide board: Ana Reyes x2 with beasy board (w/c to and from mat table) Slide board: modA Slide board:Pina   Ambulation   NT Patient unable NT    To be assessed as appropriate  To be assessed as appropriate   Walking 10 feet on uneven surface  NT patient unable NT    To be assessed as appropriate  To be assessed as appropriate   Wheel Chair Mobility Min assist with Bilat UE's X 10 feet 200' and 125' with bilateral UE with Supervision 150' x2 sup bilateral UE with sup   Modified Independent with bilat UE's X feet level surfaces 500'   Car Transfers NT  NT   Max assist  Mod assist   Stair negotiation: ascended and descended                   NT NT    To be assessed as appropriate  To be assessed as appropriate   Curb Step:   ascended and descended NT NT   To be assessed To be assessed   Picking up object off the floor NT NT   To be assessed To be assessedt   BLE ROM PROM WNL's all aspects PROM WNL's all aspects         BLE Strength Trace Right LE PF/DF, Knee Ext, Hip Ext. Left LE 0/5 Left LE: hip 0/5, knee 1/5, ankle 1/5  Right LE: hip 1/5, knee 2+/5, ankle 1/5  Increase Right LE strength to P-/P  Increase Left LE Strength to trace/P- Increase Right LE strength to P+/F-  Increase Left LE strength to P/P+   Balance  Seated static balance F+ with UE support Sitting: Min A at EOB  Standing: dep Static Sitting: cga/sba  Dynamic Sitting: Min A at EOB  Standing: dep       Date Family Teach Completed NA /son present:  6/13, 6/14, 6/15        Is additional Family Teaching Needed? Y or N yes Pending improvement        Hindering Progress LE weakness LE weakness, sitting balance, activity tolerance        PT recommended ELOS 6 weeks          Team's Discharge Plan             Therapist at Team Meeting               Therapeutic Exercise:   AM: sitting balance at EOB: backwards onto elbows on wedge with 2 pillows and 1 pillow, 4 reps x 5 with Pina for weight shift to get shoulder extended to return to sitting midline. Laterally onto elbow 2x10 bilateral UE, cg/Pina to correct posterior LOB. PM: static sitting unsupported at EOB with no LOB able to maintain midline for 30 seconds cg/sba. Pt noted to have increased trunk control (specifically right trunk vs left trunk) and tends to favor right side with dynamic activity. Dynamic balance activity at EOB reaching for target cg/Pina to return to midline. w/c negotiation around targets in hallway with sharp turning, 180 degree turning, and narrow negotiation with no objects knocked over.    w/c push up Pina with 4 in curb step under feet to de weight

## 2018-06-18 NOTE — PROGRESS NOTES
PROM WNL's all aspects PROM WNL     BLE Strength Trace Right LE PF/DF, Knee Ext, Hip Ext. Left LE 0/5 Left LE: hip 0/5, knee 0/5, ankle 1/5  Right LE: hip 1/5, knee 1/5, ankle 1/5 Left LE: hip 0/5, knee 1/5, ankle 1/5  Right LE: hip 1/5, knee 2+/5, ankle 1/5 Increase Right LE strength to P-/P  Increase Left LE Strength to trace/P- Increase Right LE strength to P+/F-  Increase Left LE strength to P/P+   Balance  Seated static balance F+ with UE support Sitting: Min A at EOB  Standing: dep at [employer] lift Static Sitting: cga/sba  Dynamic Sitting: Min A at EOB  Standing: dep     Date Family Teach Completed NA Pending improvement /son present:  6/13, 6/14, 6/15     Is additional Family Teaching Needed? Y or N yes       Hindering Progress LE weakness LE weakness, sitting balance, activity tolerance LE weakness, sitting balance, activity tolerance     PT recommended ELOS 6 weeks 5-6 weeks 3-4 weeks     Team's Discharge Plan  4 weeks Re-eval next week     Therapist at Ileana Malcolm, PT, DPT  [id number] Kemi Elam, PT, DPT [id number]         Date:  6/18/18  Supporting factors:  Very motivated and very strong family support. Family looking into w/c ramp, trunk strength/sitting balance improving (right side> left side) . Slight return in right LE quad strength. Increased mobility with w/c propulsion with bilateral UE. Barriers to discharge:  Continuing to require assist x2 for safety with b-easy board (will progress and trial assist x1 this week). Step barrier pending w/c ramp addition to home. Limited return noted to left LE at this time. Additional comments:  Pt demonstrating compensatory balance technique with bilateral UE in sitting, as well as return in trunk strength with noted improvement in static/dynamic sitting balance activity. Pt at this time requiring assist x2 with b-easy board transfer (improvement in A required from last week) and will trial to progress to A x 1 pending safety.  At this time, will require 24 hr sup at d/c.   DME:  TBD  After Care:  TBD    Alton RichardMOISES cruz  [id number]    Date:  6/11/18  Supporting factors: Motivated, trace strength bilateral ankle/left quad, sensation returning, strong family support  Barriers to discharge: Activity tolerance, poor strength in bilateral LE, poor sitting balance (static and dynamic)  Additional comments:  Pt very motivated however limited by above barriers. Will emphasize ROM/weight bearing to bilateral LE with ALLY lift as well as slide board transfers pending trunk/sitting balance improvement and progress as appropriate. DME:  TBA  After Care:  TBA    6/12/18: Please try B-easy board with transfers. Patient will require ramp at discharge, please determine if portable ramp is feasible. FT to be scheduled daily with  due to burden of care.   John Wilkins, PT, DPT [id number]    [facility], ADAMAT  [id number]

## 2018-06-19 NOTE — PROGRESS NOTES
Physical Therapy  Facility/Department: [address] [facility]  Daily Treatment Note  NAME: Shelly Rodríguez  : 1945  MRN: [id number]    Date of Service: 2018     Evaluating Therapist: Gwen Kinney P.T.     ROOM: [address] [place] [address]  DIAGNOSIS: metastatic disease spine- s/p laminectomy T4-6 (2018)  PRECAUTIONS: Fall Risk, soft TLSO & spinal precautions, PRAFO in bed and when up     Social:  Pt lives with  in a 1 floor plan 2 steps and 0 rails. Prior to admission: Independent PTA       Initial Evaluation  18 AM     PM    Short Term Goals Long Term Goals    Was pt agreeable to Eval/treatment? 2018 Yes yes       Does pt have pain?  8/10 back pain Mild c/o back pain with activity 3/10 back pain       Bed Mobility  Rolling: Min assist with bedrail  Supine to sit: dependent  Sit to supine: dependent  Scooting: dependent Rolling: NT  Supine to sit: NT  Sit to supine: NT  Scooting: maxA Rolling: NT  Supine to sit: maxAx2   Sit to supine: maxA x2  Scooting: maxA Rolling: sba  Supine to sit: maxA  Sit to supine: maxA  Scooting: maxA Rolling: mod I  Supine to sit: modA  Sit to supine: modA  Scooting: modA   Transfers Sit to stand: Dependent  Sliding Board: Dependent  Stand to sit: Dependent  Stand pivot: Dependent Sit<>stand: NT  Stand pivot: NT  Slide board: modA/maxA x1 with beasy board (w/c to and from mat table) Sit<>stand: NT  Stand pivot: NT  Slide board: mod/maxA x1 with beasy board (w/c to and from mat table) Slide board: modA Slide board:Pina   Ambulation   NT Patient unable NT    To be assessed as appropriate  To be assessed as appropriate   Walking 10 feet on uneven surface  NT patient unable NT    To be assessed as appropriate  To be assessed as appropriate   Wheel Chair Mobility Min assist with Bilat UE's X 10 feet 76' with bilateral UE with Supervision NT   Modified Independent with bilat UE's X feet level surfaces 500'   Car Transfers NT  NT   Max assist  Mod assist   Stair negotiation: ascended and descended                   NT NT    To be assessed as appropriate  To be assessed as appropriate   Curb Step:   ascended and descended NT NT   To be assessed To be assessed   Picking up object off the floor NT NT   To be assessed To be assessedt   BLE ROM PROM WNL's all aspects PROM WNL's all aspects         BLE Strength Trace Right LE PF/DF, Knee Ext, Hip Ext. Left LE 0/5 Left LE: hip 0/5, knee 1/5, ankle 1/5  Right LE: hip 1/5, knee 2+/5, ankle 2+/5  Increase Right LE strength to P-/P  Increase Left LE Strength to trace/P- Increase Right LE strength to P+/F-  Increase Left LE strength to P/P+   Balance  Seated static balance F+ with UE support Sitting: sba/cgA at EOB  Standing: dep Static Sitting: cga/sba  Dynamic Sitting: Min A at EOB  Standing: dep       Date Family Teach Completed NA /son present:  6/13, 6/14, 6/15        Is additional Family Teaching Needed? Y or N yes Pending improvement        Hindering Progress LE weakness LE weakness, sitting balance, activity tolerance        PT recommended ELOS 6 weeks          Team's Discharge Plan             Therapist at Team Meeting               Therapeutic Exercise:   AM: w/c push up 3x 5 modA to deweight at glut tub. Sitting balance activity unsupported with no lob noted, trial perturbation with mild LOB anterior at times with fatigue. Dynamic balance laterally onto elbow back to midline cgA to complete, posterior onto wedge cg/Pina to return back to standing.      PM: homer med 11 minutes with 3 minutes of active input  Supine aarom exercise saq, ap, glut set, qs, hip flexion, hip extension x20 bilateral LE    Patient education  Pt educated on weight shifting for balance/pressure relief, hand placement with slide board, propelling w/c    Patient response to education:   Pt verbalized understanding Pt demonstrated skill Pt requires further education in this area   yes Yes with tactile cueing reinforcement     Additional Comments: Emphasis on sitting balance in AM with unsupported balance activities as well as dynamic balance with UE support. Pt continues to tolerate increased sitting balance activity without UE support as well as dynamic activity, at times cg/Pina with dynamic activity to return to midline. b-easy board completed mod/mAx x1 with most of assistance required for board placement as well as feet management with 4 in curb step under feet (2* height of patient). Pt with increased fatigue overall this date but tolerated all activity. In PM, continued to use homer med for recip patterning/weight bearing/strengthening of bilateral LE with 3 mins of active motion completed. Skin was inspected: Bilateral PRAFO removed and feet/legs inspected. No changes from previous day. PRAFO placed back on. AM  Time in: 1100  Time out: 1145    PM  Time in: 1515  Time out: 1600    Pt is making fair+ progress toward established Physical Therapy goals. Continue with physical therapy current plan of care.     Giselle Martino DPT  [id number]

## 2018-06-19 NOTE — PLAN OF CARE
Problem: Falls - Risk of:  Goal: Will remain free from falls  Will remain free from falls   Outcome: Met This Shift      Problem: Pain:  Goal: Control of acute pain  Control of acute pain   Outcome: Met This Shift

## 2018-06-19 NOTE — CARE COORDINATION
Per team meeting- discharge TBD after son, Kennedy Morales, attends family teach on 6/21. SW will check with PT on 6/21 to see if the plan is still to take the patient home and the Kennedy Morales is able to. Updated patient and LVM for son, Kennedy Morales. LTG: Mod A/ Mod I. Patient will discharge at wheelchair level. DME & Aftercare- TBD If pt is to D/C home Sw will need to order francis lift and custom wc.     FT- 6/21AM with TIGRE Goldberg Intern   [place]  6/19/2018

## 2018-06-19 NOTE — PROGRESS NOTES
Perception:    Education:  Pt educated on positioning of TLSO. [x] Family teach completed on: 6/13/18 Pt with son and  present for therapy session. Educated with regards to donning TLSO supine in bed,  LE dressing techniques supine in bed for EC/WS techniques, clothing management with regards to toileting when using francis to transfer to commode. 6/14/18 with son in law and  of patient. Family observed commode transfer with transfer board. Discussed ADL levels and safety and DME for home. Pain Level: 6-7/10  back     Additional Notes:       Patient has made fair   progress during treatment sessions toward set goals. Therapy emphasis to obtain goals:  Balance, transfers, ADL's, strength/endurance. Functional mobility, wheel chair mobility, positioning, safety education and home mgmt. [x] Continue with current OT Plan of care. [] Prepare for Discharge     DISCHARGE RECOMMENDATIONS  Recommended DME:  Drop arm 3 in 1. Post Discharge Care:   []Home Independently  [x]Home with 24hr Care / Supervision []Home with Partial Supervision []Home with Home Health OT []Home with Out Pt OT []Other: ___   Comments:         Time in Time out Tx Time Breakdown  Variance:   First Session  [phone] [] Individual Tx-   [x] Concurrent Tx- 45 mins  [] Co-Tx -   [] Group Tx -   [] Time Missed -     Second Session [phone] [] Individual Tx-   [] Concurrent Tx- 45 mins  [] Co-Tx -   [] Group Tx -   [] Time Missed -     Third Session    [] Individual Tx-   [] Concurrent Tx -  [] Co-Tx -   [] Group Tx -   [] Time Missed -         Total Tx Time 90  mins   Roula Morales  ALCANTARA/L [id number]    I have read & agree with the above status.     Antolin Levin OTR/L [id number]

## 2018-06-19 NOTE — PROGRESS NOTES
(LOVENOX) injection 40 mg  40 mg Subcutaneous Daily Emma Keys MD   40 mg at 06/18/18 0813     Allergies   Allergen Reactions    Other Other (See Comments)     Seasonal allergies, cough and sinus drainage, sneeze     Active Problems:    Spinal cord neoplasm  Resolved Problems:    * No resolved hospital problems. *    Blood pressure 125/74, pulse 90, temperature 98 °F (36.7 °C), temperature source Temporal, resp. rate 18, height 4' 11\" (1.499 m), weight 163 lb 1.6 oz (74 kg), SpO2 95 %, not currently breastfeeding. Subjective:  Symptoms:  Stable. She reports weakness. Diet:  Adequate intake. Activity level: Impaired due to weakness. Pain:  She complains of pain that is moderate. Objective:  General Appearance: In no acute distress. Vital signs: (most recent): Blood pressure 125/74, pulse 90, temperature 98 °F (36.7 °C), temperature source Temporal, resp. rate 18, height 4' 11\" (1.499 m), weight 163 lb 1.6 oz (74 kg), SpO2 95 %, not currently breastfeeding. Vital signs are normal.    Output: Producing urine and producing stool. Lungs:  Normal effort and normal respiratory rate. Breath sounds clear to auscultation. Heart: Normal rate. Regular rhythm. S1 normal and S2 normal.    Abdomen: Abdomen is soft. Bowel sounds are normal.   There is no abdominal tenderness. Extremities: Normal range of motion. Neurological: Patient is alert. (3/5 lowers). Assessment:    Condition: In stable condition. Improving. (Spinal cord tumor). Plan:   Encourage ambulation. (Tolerating therapy well  Review in team today  Pain control  Working at wheelchair level).        Lalit Pinto MD  6/19/2018

## 2018-06-19 NOTE — PATIENT CARE CONFERENCE
at Discharge: anticipate home health services  Equipment at Discharge: hospital bed, francis lift, wheelchair      INTERDISCIPLINARY TEAM/PHYSICIAN RECOMMENDATION AND/OR REVISIONS OF PLAN OF CARE:  discuss with family burden of care, can they handle all DME, start process for custom wheelchair      I approve the established interdisciplinary plan of care as documented within the medical record of Leah Aga. Please see team conference signature page for those in attendance.     Electronically signed by Lucas Dillard RN  on 6/19/2018 at 9:04 AM

## 2018-06-20 NOTE — PROGRESS NOTES
Cayden Caldera is a 81 y.o. female patient.     Current Facility-Administered Medications   Medication Dose Route Frequency Provider Last Rate Last Dose    cefTRIAXone (ROCEPHIN) 1 g in sterile water 10 mL IV syringe  1 g Intravenous Q24H Shari Calle MD   1 g at 06/19/18 1813    sodium chloride flush 0.9 % injection 10 mL  10 mL Intravenous PRN Rosa Krishnan MD   10 mL at 06/18/18 1746    sodium chloride flush 0.9 % injection 10 mL  10 mL Intravenous BID Rosa Krishnan MD   10 mL at 06/19/18 0907    ipratropium-albuterol (DUONEB) nebulizer solution 1 ampule  1 ampule Inhalation 4x daily Rosa Krishnan MD   1 ampule at 06/20/18 0655    lactulose (CHRONULAC) 10 GM/15ML solution 10 g  10 g Oral Daily Kennedy BETTIE Jones MD   10 g at 06/19/18 0907    fluticasone (FLONASE) 50 MCG/ACT nasal spray 1 spray  1 spray Each Nare Daily Kennedy A MD Karen   1 spray at 06/16/18 0915    ondansetron (ZOFRAN) injection 4 mg  4 mg Intravenous Q6H PRN Jared Torres, DO        oxyCODONE-acetaminophen (PERCOCET) 5-325 MG per tablet 1 tablet  1 tablet Oral Q4H PRN Montana Trent, DO   1 tablet at 06/20/18 0327    pantoprazole (PROTONIX) tablet 40 mg  40 mg Oral QAM AC Jared Torres, DO   40 mg at 06/20/18 0636    polyethylene glycol (GLYCOLAX) packet 17 g  17 g Oral Daily Jared Torres, DO   17 g at 06/19/18 2582    sennosides-docusate sodium (SENOKOT-S) 8.6-50 MG tablet 2 tablet  2 tablet Oral Daily PRN Montana Trent, DO   2 tablet at 06/19/18 1580    therapeutic multivitamin-minerals 1 tablet  1 tablet Oral Daily Jared Torres, DO   1 tablet at 06/19/18 3238    vitamin B and C (TOTAL B-C) 1 tablet  1 tablet Oral Daily Jared Torres, DO   1 tablet at 06/19/18 8827    vitamin D tablet 2,000 Units  2,000 Units Oral Daily Jared Torres DO   2,000 Units at 06/19/18 0906    acetaminophen (TYLENOL) tablet 650 mg  650 mg Oral TID PRN Haroldo Sinha MD        enoxaparin

## 2018-06-20 NOTE — PROGRESS NOTES
Solving fair    Memory fair    Sequencing fair    Safety fair      Visual Perception:    Education:  Pt/family educated on technique for safe tub transfer using transfer board and ext tub bench. [x] Family teach completed on: 6/13/18 Pt with son and  present for therapy session. Educated with regards to donning TLSO supine in bed,  LE dressing techniques supine in bed for EC/WS techniques, clothing management with regards to toileting when using francis to transfer to commode. 6/14/18 with son in law and  of patient. Family observed commode transfer with transfer board. Discussed ADL levels and safety and DME for home. Pain Level: 6-7/10  back     Additional Notes:       Patient has made fair   progress during treatment sessions toward set goals. Therapy emphasis to obtain goals:  Balance, transfers, ADL's, strength/endurance. Functional mobility, wheel chair mobility, positioning, safety education and home mgmt. [x] Continue with current OT Plan of care. [] Prepare for Discharge     DISCHARGE RECOMMENDATIONS  Recommended DME:  Drop arm 3 in 1. Post Discharge Care:   []Home Independently  [x]Home with 24hr Care / Supervision []Home with Partial Supervision []Home with Home Health OT []Home with Out Pt OT []Other: ___   Comments:         Time in Time out Tx Time Breakdown  Variance:   First Session  [phone] [] Individual Tx-   [x] Concurrent Tx- 45 mins  [] Co-Tx -   [] Group Tx -   [] Time Missed -     Second Session [phone] [] Individual Tx-   [] Concurrent Tx- 45 mins  [] Co-Tx -   [] Group Tx -   [] Time Missed -     Third Session    [] Individual Tx-   [] Concurrent Tx -  [] Co-Tx -   [] Group Tx -   [] Time Missed -         Total Tx Time 90  mins   Tho Santana Rising  ALCANTARA/L [id number]      I have read & agree with the above status.     Jatin William OTR/L [id number]

## 2018-06-20 NOTE — PROGRESS NOTES
Physical Therapy  Facility/Department: [address] REH[facility]  Daily Treatment Note  NAME: Regino Choi  : 1945  MRN: [id number]    Date of Service: 2018     Evaluating Therapist: Barbara Weaver P.T.     ROOM: [address] [place] [address]  DIAGNOSIS: metastatic disease spine- s/p laminectomy T4-6 (2018)  PRECAUTIONS: Fall Risk, soft TLSO & spinal precautions, PRAFO in bed and when up     Social:  Pt lives with  in a 1 floor plan 2 steps and 0 rails. Prior to admission: Independent PTA       Initial Evaluation  18 AM     PM    Short Term Goals Long Term Goals    Was pt agreeable to Eval/treatment? 2018 Yes yes       Does pt have pain? 8/10 back pain Mild c/o back pain with activity 4/10 back pain       Bed Mobility  Rolling: Min assist with bedrail  Supine to sit: dependent  Sit to supine: dependent  Scooting: dependent Rolling: NT  Supine to sit: maxA x2  Sit to supine: maxA x2  Scooting: maxA Rolling: Pina  Supine to sit: maxAx2   Sit to supine: maxA x2  Scooting: maxA Rolling: sba  Supine to sit: maxA  Sit to supine: maxA  Scooting: maxA Rolling: mod I  Supine to sit: modA  Sit to supine: modA  Scooting: modA   Transfers Sit to stand: Dependent  Sliding Board: Dependent  Stand to sit: Dependent  Stand pivot: Dependent Sit<>stand: NT  Stand pivot: NT  Slide board: modA/maxA x1 with slide board (modA onto mat, maxA to w/c) Sit<>stand: max x2 with 3rd therapist blocking knees at std.  walker  Stand pivot: NT  Slide board: mod/maxA x1 with beasy board and slide board (w/c to and from mat table) Slide board: Roma Quick board:Pina   Ambulation   NT Patient unable NT    To be assessed as appropriate  To be assessed as appropriate   Walking 10 feet on uneven surface  NT patient unable NT    To be assessed as appropriate  To be assessed as appropriate   Wheel Chair Mobility Min assist with Bilat UE's X 10 feet 125' with bilateral UE with Supervision 150' sup with bilateral UE   Modified Independent with

## 2018-06-21 NOTE — PROGRESS NOTES
Physical Therapy  Facility/Department: [address] RE[facility]  Daily Treatment Note  NAME: Bart oTny  : 1945  MRN: [id number]    Date of Service: 2018     Evaluating Therapist: Maria D Santana P.T.     ROOM: [address] [place] [address]  DIAGNOSIS: metastatic disease spine- s/p laminectomy T4-6 (2018)  PRECAUTIONS: Fall Risk, soft TLSO & spinal precautions, PRAFO in bed and when up     Social:  Pt lives with  in a 1 floor plan 2 steps and 0 rails. Prior to admission: Independent PTA       Initial Evaluation  18 AM     PM    Short Term Goals Long Term Goals    Was pt agreeable to Eval/treatment? 2018 Yes yes       Does pt have pain? 8/10 back pain Mild c/o back pain with activity 3/10 back pain       Bed Mobility  Rolling: Min assist with bedrail  Supine to sit: dependent  Sit to supine: dependent  Scooting: dependent Rolling: NT  Supine to sit: maxA x2  Sit to supine: maxA x2  Scooting: maxA Rolling: Pina  Supine to sit: maxA x2   Sit to supine: maxA x1  Scooting: maxA Rolling: sba  Supine to sit: maxA  Sit to supine: maxA  Scooting: maxA Rolling: mod I  Supine to sit: modA  Sit to supine: modA  Scooting: modA   Transfers Sit to stand: Dependent  Sliding Board: Dependent  Stand to sit: Dependent  Stand pivot: Dependent Sit<>stand: NT  Stand pivot: NT  Slide board: modA x1 with b-easy board with 2 in board for feet to touch.  Sit<>stand:NT  Stand pivot: NT  Slide board: modA x1 with beasy board and (w/c to and from mat table) Slide board: Redd Tee board:Pina   Ambulation   NT Patient unable NT    To be assessed as appropriate  To be assessed as appropriate   Walking 10 feet on uneven surface  NT patient unable NT    To be assessed as appropriate  To be assessed as appropriate   Wheel Chair Mobility Min assist with Bilat UE's X 10 feet 125' with bilateral UE with Supervision 150' sup with bilateral UE   Modified Independent with bilat UE's X feet level surfaces 500'   Car Transfers NT  NT   Max assist  Mod assist   Stair negotiation: ascended and descended                   NT NT    To be assessed as appropriate  To be assessed as appropriate   Curb Step:   ascended and descended NT NT   To be assessed To be assessed   Picking up object off the floor NT NT   To be assessed To be assessedt   BLE ROM PROM WNL's all aspects PROM WNL's all aspects         BLE Strength Trace Right LE PF/DF, Knee Ext, Hip Ext. Left LE 0/5 Left LE: hip 0/5, knee 1/5, ankle 1/5  Right LE: hip 1/5, knee 2+/5, ankle 2+/5 Left LE: hip 0/5, knee 1/5, ankle 2-/5  Right LE: hip 1/5, knee 2+/5, ankle 2+/5 Increase Right LE strength to P-/P  Increase Left LE Strength to trace/P- Increase Right LE strength to P+/F-  Increase Left LE strength to P/P+   Balance  Seated static balance F+ with UE support Sitting: sba/cgA at EOB  Standing: dep Static Sitting: cga/sba  Dynamic Sitting: Min A at EOB  Standing: dep       Date Family Teach Completed NA /son present:  6/13, 6/14, 6/15 /son present:  6/13, 6/14, 6/15, 6/21  FT with family:  6/21       Is additional Family Teaching Needed?   Y or N yes Pending improvement Y (son and DIL to continue to attend)       Hindering Progress LE weakness LE weakness, sitting balance, activity tolerance LE weakness, sitting balance, activity tolerance       PT recommended ELOS 6 weeks          Team's Discharge Plan             Therapist at Team Meeting               Therapeutic Exercise:   AM: n/a    PM: supine gs, qs, hip add, hip IR, and aarom hip flexion/ext/abd/saq/ap  x30    Patient education  Pt and son/DIL educated on b-easy board placement, s/b transfer technique, hand placement, weight shifting, positional changes every 2 hours at least, assist x2 for bed mobility, rec second person on stand by for slide board at this time with family    Patient response to education:   Pt verbalized understanding Pt demonstrated skill Pt requires further education in this area   yes yes reinfrocement- family to attend future sesions     Additional Comments: FT this date for hands on training with son and DIL, as well as  and PATY present. Reviewed safety concerns with current assistance level with b-easy board and demonstrated. Reviewed body mechanics, proper board placement, arm rest management, sitting balance and importance of hand placement for patient, foot management, technique/hand placement to assist with transfer with son and DIL both demonstrating transfer. Assisted family with b-easy board placement in both instances. Family to continue to attend sessions going forward for training on b-easy vs slide board transfer as well as trialing from various surfaces including hospital bed. Family needing cues for body mechanics and for attending to patients sitting balacne when they reposition feet during transfer however performed safe transfer w/c to and from mat. Both will benefit from further training. Family aware that pt at this time will require 24 hour supervision at d/c. In PM, emphasis on active assisted there ex in supine per above grid, noted increase in left ankle df/pf strength this date. Skin was inspected: yes pre/post- ankles checked and Prafos removed and donned at end of session  Chair/bed alarm:     AM  Time in: 1045   Time out: 1130    PM  Time in: 1515  Time out: 1600    Pt is making fair+ progress toward established Physical Therapy goals. Continue with physical therapy current plan of care.     Janiya Bobby DPT  [id number]

## 2018-06-21 NOTE — PROGRESS NOTES
Miguelito Shin is a 81 y.o. female patient.     Current Facility-Administered Medications   Medication Dose Route Frequency Provider Last Rate Last Dose    cefTRIAXone (ROCEPHIN) 1 g in sterile water 10 mL IV syringe  1 g Intravenous Q24H Shari Lam MD   1 g at 06/20/18 1817    sodium chloride flush 0.9 % injection 10 mL  10 mL Intravenous PRN Erin Childs MD   10 mL at 06/20/18 1818    sodium chloride flush 0.9 % injection 10 mL  10 mL Intravenous BID Erin Childs MD   10 mL at 06/21/18 0842    ipratropium-albuterol (DUONEB) nebulizer solution 1 ampule  1 ampule Inhalation 4x daily Kennedy A MD Karen   1 ampule at 06/21/18 0700    lactulose (CHRONULAC) 10 GM/15ML solution 10 g  10 g Oral Daily Kennedy A MD Karen   10 g at 06/20/18 0927    fluticasone (FLONASE) 50 MCG/ACT nasal spray 1 spray  1 spray Each Nare Daily Erin Childs MD   1 spray at 06/21/18 0842    ondansetron (ZOFRAN) injection 4 mg  4 mg Intravenous Q6H PRN Darrell Lamon Olszewski, DO        oxyCODONE-acetaminophen (PERCOCET) 5-325 MG per tablet 1 tablet  1 tablet Oral Q4H PRN Brandan Pineda DO   1 tablet at 06/21/18 0635    pantoprazole (PROTONIX) tablet 40 mg  40 mg Oral QAM AC Darrell Lamon Olszewski, DO   40 mg at 06/21/18 8565    polyethylene glycol (GLYCOLAX) packet 17 g  17 g Oral Daily Darrell Lamon Olszewski, DO   17 g at 06/20/18 9981    sennosides-docusate sodium (SENOKOT-S) 8.6-50 MG tablet 2 tablet  2 tablet Oral Daily PRN Brandan Pineda DO   2 tablet at 06/19/18 0611    therapeutic multivitamin-minerals 1 tablet  1 tablet Oral Daily Darrell Lamon Olszewski, DO   1 tablet at 06/21/18 0842    vitamin B and C (TOTAL B-C) 1 tablet  1 tablet Oral Daily Darrell Lamon Olszewski, DO   1 tablet at 06/21/18 0843    vitamin D tablet 2,000 Units  2,000 Units Oral Daily Darrell Lamon Olszewski, DO   2,000 Units at 06/21/18 0843    acetaminophen (TYLENOL) tablet 650 mg  650 mg Oral TID PRN Hossein Graves MD        enoxaparin (LOVENOX) injection 40 mg  40 mg Subcutaneous Daily Emma Harmon MD   40 mg at 06/21/18 6963     Allergies   Allergen Reactions    Other Other (See Comments)     Seasonal allergies, cough and sinus drainage, sneeze     Active Problems:    Spinal cord neoplasm  Resolved Problems:    * No resolved hospital problems. *    Blood pressure (!) 128/58, pulse 91, temperature 97.5 °F (36.4 °C), temperature source Temporal, resp. rate 16, height 4' 11\" (1.499 m), weight 163 lb 1.6 oz (74 kg), SpO2 97 %, not currently breastfeeding. Subjective:  Symptoms:  Stable. She reports weakness. Diet:  Adequate intake. Activity level: Impaired due to weakness. Pain:  She complains of pain that is mild. Objective:  General Appearance: In no acute distress. Vital signs: (most recent): Blood pressure (!) 128/58, pulse 91, temperature 97.5 °F (36.4 °C), temperature source Temporal, resp. rate 16, height 4' 11\" (1.499 m), weight 163 lb 1.6 oz (74 kg), SpO2 97 %, not currently breastfeeding. Vital signs are normal.    Output: Producing urine and producing stool. Lungs:  Normal effort and normal respiratory rate. Breath sounds clear to auscultation. Heart: Normal rate. Regular rhythm. S1 normal and S2 normal.    Abdomen: Abdomen is soft. Bowel sounds are normal.   There is no abdominal tenderness. Extremities: Normal range of motion. Neurological: Patient is alert. (3-/5 lowers). Assessment:    Condition: In stable condition. Improving. (Spinal CORD compression). Plan:   Encourage ambulation. (Biopsy apparently shows fibrotic tissue but no malignant cells. I discussed her rehab progress with the family. This should have a better prognosis. Family is working with patient functioning at a wheelchair level).        Dann Goodson MD  6/21/2018

## 2018-06-22 NOTE — PROGRESS NOTES
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     Current Facility-Administered Medications   Medication Dose Route Frequency Provider Last Rate Last Dose    cefTRIAXone (ROCEPHIN) 1 g in sterile water 10 mL IV syringe  1 g Intravenous Q24H Shari Bahena MD   1 g at 06/21/18 2012    sodium chloride flush 0.9 % injection 10 mL  10 mL Intravenous PRN Siri Contreras MD   10 mL at 06/20/18 1818    sodium chloride flush 0.9 % injection 10 mL  10 mL Intravenous BID Siri Contreras MD   10 mL at 06/21/18 2106    ipratropium-albuterol ([place] Dom) nebulizer solution 1 ampule  1 ampule Inhalation 4x daily Siri Contreras MD   1 ampule at 06/22/18 0806    lactulose (CHRONULAC) 10 GM/15ML solution 10 g  10 g Oral Daily Kennedy Jones MD   10 g at 06/20/18 0927    fluticasone (FLONASE) 50 MCG/ACT nasal spray 1 spray  1 spray Each Nare Daily Siri Contreras MD   1 spray at 06/21/18 0842    ondansetron (ZOFRAN) injection 4 mg  4 mg Intravenous Q6H PRN Jared Li [place], DO        oxyCODONE-acetaminophen (PERCOCET) 5-325 MG per tablet 1 tablet  1 tablet Oral Q4H PRN Kristina Trimble, DO   1 tablet at 06/22/18 0716    pantoprazole (PROTONIX) tablet 40 mg  40 mg Oral QAM AC Jared Li [place], DO   40 mg at 06/22/18 0708    polyethylene glycol (GLYCOLAX) packet 17 g  17 g Oral Daily Jared Li [place], DO   17 g at 06/20/18 0239    sennosides-docusate sodium (SENOKOT-S) 8.6-50 MG tablet 2 tablet  2 tablet Oral Daily PRN Kristina Trimble DO   2 tablet at 06/19/18 9958    therapeutic multivitamin-minerals 1 tablet  1 tablet Oral Daily Jared Li [place], DO   1 tablet at 06/21/18 0842    vitamin B and C (TOTAL B-C) 1 tablet  1 tablet Oral Daily Jared Li [place], DO   1 tablet at 06/21/18 0843    vitamin D tablet 2,000 Units  2,000 Units Oral Daily Jared Li [place], DO   2,000 Units at 06/21/18 0843    acetaminophen (TYLENOL) tablet 650 mg  650 mg Oral TID PRN Ruth Persaud MD        enoxaparin Tub  [] Shower   Transfer 6/20/18  Mod A x1 and SBA X1 Ext tub bench Pt. Completed transfer with transfer board and 4 inch step for BLE's to reach floor. Commode   Transfer 6/21/18 Mod A x1 and SBA of 1 Drop arm commode and transfer board  Pt. Requires 4 inch step to help BLE's to reach floor to push off during transfers. Functional   Mobility 6/21/18 S    w/c level Func mobility at w/c level to and from therapy. Other: supine to EOB     Bed rolling        Bed scooting     Bed to w/c  6/9/18 6/17/18 6/9/18 6/21/18 Dep/total        Min/mod assist     Max assist     Mod A                         Transfer board                      Attempted using pushup blocks to assist with leverage during transfers.       Functional Exercises / Activity:  Rickshaw ex's to increase BUE strength to increase independence with transfers. 35# wt- 10 reps x3, 37 1/2# wt-30 reps. Dowel gricelda ex's with 3# wt concentration on bicep curls and tricep extensions 15 reps x3  Yellow bernardo bar to increase forearm and  strength of BUE's. 25 reps x4 planes.        Assessment:    Condition: In stable condition. Improving. (Spinal cord tumor). Plan:   Encourage ambulation. (Tolerating therapy well  Working at wheelchair level  Family was in yesterday  Discussed at length with them).        Joel Ghotra MD  6/22/2018

## 2018-06-22 NOTE — PROGRESS NOTES
OCCUPATIONAL THERAPY DAILY NOTE    Date:2018  Patient Name: Sara López  MRN: [id number]  : 1945  Room: 16/5516-A     Patient Active Problem List   Diagnosis    S/P hip replacement    Weakness of both legs    Metastatic cancer to spine [facility])    Spinal cord neoplasm       Precautions: fall risk, TLSO & spinal precautions. Functional Assessment:   Date Status AE  Comments   Feeding 18 Set up   Pt. Fed self breakfast.    Grooming 18 S/U  Pt. Groomed at bed level after s/u. Bathing 18 Min A LH sponge Pt. Sponge bathed at bed level with LH sponge to wash BLE's. Min A to wash backside. UB Dressing 18 S/U- shirt  Max A- TLSO  Pt. Donned pullover shirt after s/u. Max A to flaquita TLSO. LB Dressing 18 Mod A  Reacher  Sock aid Pt. Donned depends and pants at bed level with reachers with increased time. Dependent to flaquita praffoes. Mod A to roll side to side and pull pants and depends up over hips. Homemaking 18 S/U  mod assist- Kitchen  w/c level and reacher       Functional Transfers / Balance:   Date Status DME  Comments   Sit Balance 18 Min A     Stand Balance 18       [x] Tub  [] Shower   Transfer 18  Mod A x1 and SBA X1 Ext tub bench    Commode   Transfer 18 Mod A x1 and SBA of 1 Drop arm commode and transfer board     Functional   Mobility 18 S   w/c level    Other: supine to EOB    Bed rolling      Bed scooting    Bed to w/c  18 Dep/total      Min/mod assist    Max assist    Mod A                 Transfer board      Functional Exercises / Activity:  Rickshaw ex's to increase BUE strength to increase independence with transfers. 35# wt- 10 reps x3, 37 1/2# wt-30 reps. Blue theraputty ax with wheel to increase AROM, strength and FMC of BUE's. Func FM nuts/bolts building ax with 1# wrist wts to increase AROM, strength and FMC of BUE's.      Sensory / Neuromuscular Re-Education:      Cognitive Skills:   Status Comments   Problem   Solving fair    Memory fair    Sequencing fair    Safety fair      Visual Perception:    Education:  Pt educated on AE to increase independence with ADL's. [x] Family teach completed on: 6/13/18 Pt with son and  present for therapy session. Educated with regards to donning TLSO supine in bed,  LE dressing techniques supine in bed for EC/WS techniques, clothing management with regards to toileting when using francis to transfer to commode. 6/14/18 with son in law and  of patient. Family observed commode transfer with transfer board. Discussed ADL levels and safety and DME for home. 6/21/18- Son performed commode transfer with patient today with SBA of therapist.   Pain Level: 6-7/10  back     Additional Notes:       Patient has made fair   progress during treatment sessions toward set goals. Therapy emphasis to obtain goals:  Balance, transfers, ADL's, strength/endurance. Functional mobility, wheel chair mobility, positioning, safety education and home mgmt. [x] Continue with current OT Plan of care. [] Prepare for Discharge     DISCHARGE RECOMMENDATIONS  Recommended DME:  Drop arm 3 in 1. Post Discharge Care:   []Home Independently  [x]Home with 24hr Care / Supervision []Home with Partial Supervision []Home with Home Health OT []Home with Out Pt OT []Other: ___   Comments:         Time in Time out Tx Time Breakdown  Variance:   First Session  [id number] [] Individual Tx-   [x] Concurrent Tx- 45 mins  [] Co-Tx -   [] Group Tx -   [] Time Missed -     Second Session [phone] [] Individual Tx-   [] Concurrent Tx- 45 mins  [] Co-Tx -   [] Group Tx -   [] Time Missed -     Third Session    [] Individual Tx-   [] Concurrent Tx -  [] Co-Tx -   [] Group Tx -   [] Time Missed -         Total Tx Time 90  mins   Elsa Skiff Rising  ALCANTARA/L [id number]  I have read the above note and agree with the documentation.   Libra Hutson OTR/L

## 2018-06-22 NOTE — CARE COORDINATION
Pt reported to PT that a WC had been ordered through [facility] prior to admission to hospital but was never received. SW contacted [facility] and they reported they had a referral but never got a script, TIGRE spoke with Fran Salazar and cancelled that order. Pt will be fitted with [employer] seating and mobility.     Jeffy Fails  6/22/2018

## 2018-06-22 NOTE — PROGRESS NOTES
Physical Therapy  Facility/Department: [address] REHAB  Daily Treatment Note  NAME: Roger Abad  : 1945  MRN: [id number]    Date of Service: 2018     Evaluating Therapist: Johna Schwab, P.T.     ROOM: [address] [place], [place] [id number]  DIAGNOSIS: metastatic disease spine- s/p laminectomy T4-6 (2018)  PRECAUTIONS: Fall Risk, soft TLSO & spinal precautions, PRAFO in bed and when up     Social:  Pt lives with  in a 1 floor plan 2 steps and 0 rails. Prior to admission: Independent PTA       Initial Evaluation  18 AM     PM    Short Term Goals Long Term Goals    Was pt agreeable to Eval/treatment? 2018 Yes yes       Does pt have pain? 8/10 back pain c/o 6/10 back pain 4-5/10 back pain       Bed Mobility  Rolling: Min assist with bedrail  Supine to sit: dependent  Sit to supine: dependent  Scooting: dependent Rolling: NT  Supine to sit: maxA x2  Sit to supine: NT  Scooting: modA/maxA Rolling: Pina  Supine to sit: maxA x2   Sit to supine: maxA x1  Scooting: mod/maxA Rolling: sba  Supine to sit: maxA  Sit to supine: maxA  Scooting: maxA Rolling: mod I  Supine to sit: modA  Sit to supine: modA  Scooting: modA   Transfers Sit to stand: Dependent  Sliding Board: Dependent  Stand to sit: Dependent  Stand pivot: Dependent Sit<>stand: maxA x2 with third blocking knees  Stand pivot: NT  Slide board: modA x1 with b-easy board with 2 in board for feet to touch.  Sit<>stand:NT  Stand pivot: NT  Slide board: modA x1 with beasy board and (w/c to and from mat table) Slide board: Reta Swann board:Pina   Ambulation   NT Patient unable NT    To be assessed as appropriate  To be assessed as appropriate   Walking 10 feet on uneven surface  NT patient unable NT    To be assessed as appropriate  To be assessed as appropriate   Wheel Chair Mobility Min assist with Bilat UE's X 10 feet 150' with bilateral UE with sup (vc for sharp turns) 150' sup with bilateral UE   Modified Independent with bilat UE's X feet level surfaces 500'   Car Transfers NT  NT   Max assist  Mod assist   Stair negotiation: ascended and descended                   NT NT    To be assessed as appropriate  To be assessed as appropriate   Curb Step:   ascended and descended NT NT   To be assessed To be assessed   Picking up object off the floor NT NT   To be assessed To be assessedt   BLE ROM PROM WNL's all aspects PROM WNL's all aspects  PROM WNL's all aspects       BLE Strength Trace Right LE PF/DF, Knee Ext, Hip Ext. Left LE 0/5  Left LE: hip 0/5, knee 1/5, ankle 2-/5  Right LE: hip 1/5, knee 2+/5, ankle 2+/5 Increase Right LE strength to P-/P  Increase Left LE Strength to trace/P- Increase Right LE strength to P+/F-  Increase Left LE strength to P/P+   Balance  Seated static balance F+ with UE support Sitting: sba/cgA at EOB  Standing: maxA x2-3 Static Sitting: cga/sba  Dynamic Sitting: Min A at EOB  Standing: dep       Date Family Teach Completed NA /son present:  6/13, 6/14, 6/15, 6/21  FT with family:  6/21, 6/22 /son present:  6/13, 6/14, 6/15, 6/21  FT with family:  6/21       Is additional Family Teaching Needed?   Y or N yes Y (son and DIL to continue to attend) Y (son and DIL to continue to attend)       Hindering Progress LE weakness LE weakness, sitting balance, activity tolerance LE weakness, sitting balance, activity tolerance       PT recommended ELOS 6 weeks          Team's Discharge Plan             Therapist at Team Meeting               Therapeutic Exercise:   AM: n/a    PM: PM: supine gs, qs, hip add, hip IR, and aarom hip flexion/ext/abd/saq/ap     Patient education  Pt and son educated on b--easy board placement, hand placement for balance with static and dynamic sitting, TLSO management/placement, foot placement during transfer, body mechanics as caregiver during transfer    Patient response to education:   Pt verbalized understanding Pt demonstrated skill Pt requires further education in this area   yes Yes with occasional cues Yes reinforcement     Additional Comments: In AM, pt reporting increased back and shoulder pain this date as well as having a bad night sleeping. Pt educated on log roll for bed mobility as well as TLSO placement to reach sitting EOB. Reviewed b-easy board and completed from hospital bed to w/c modA with occasional cues for hand placement and com over romina. Trial STS at standard ww, reaching full standing for 10-15 seconds with maxA x2 to reach standing with knee block from third therapist. Son present for continued training with b-easy board mat to and from w/c. Son providing appropriate verbal cues, placed board properly, as well as had proper guarding technique for slide board to and from mat table. Son did require occasional cues for body mechanics but overall performed safe transfer x2. Will progress to trial hospital bed to and from / (softer surface) with son in future FT. In PM, emphasis on exercise throughout ROM wih exercise. Reports back pain is down this PM.      Skin was inspected: skin inspected in PM to bilateral feet, PRAFO removed and donned back on. AM  Time in: 1045  Time out: 1130    PM  Time in: 1515  Time out: 1600    Pt is making fair progress toward established Physical Therapy goals. Continue with physical therapy current plan of care.     Gianni Garcia DPT  [id number]

## 2018-06-23 NOTE — PROGRESS NOTES
Educated with regards to donning TLSO supine in bed,  LE dressing techniques supine in bed for EC/WS techniques, clothing management with regards to toileting when using francis to transfer to commode. 6/14/18 with son in law and  of patient. Family observed commode transfer with transfer board. Discussed ADL levels and safety and DME for home. 6/21/18- Son performed commode transfer with patient today with SBA of therapist.   Pain Level: 6-7/10  back     Additional Notes:       Patient has made fair   progress during treatment sessions toward set goals. Therapy emphasis to obtain goals:  Balance, transfers, ADL's, strength/endurance. Functional mobility, wheel chair mobility, positioning, safety education and home mgmt. [x] Continue with current OT Plan of care. [] Prepare for Discharge     DISCHARGE RECOMMENDATIONS  Recommended DME:  Drop arm 3 in 1.     Post Discharge Care:   []Home Independently  [x]Home with 24hr Care / Supervision []Home with Partial Supervision []Home with Home Health OT []Home with Out Pt OT []Other: ___   Comments:         Time in Time out Tx Time Breakdown  Variance:   First Session  11:45 am 12:20 pm [x] Individual Tx- 35mins  [] Concurrent Tx-  mins  [] Co-Tx -   [] Group Tx -   [] Time Missed -     Second Session   [] Individual Tx- min  [] Concurrent Tx-  mins  [] Co-Tx -   [] Group Tx -   [] Time Missed -     Third Session    [] Individual Tx-   [] Concurrent Tx -  [] Co-Tx -   [] Group Tx -   [] Time Missed -         Total Tx Time 35  mins   Jake ALCANTARA/MADY [id number]

## 2018-06-24 NOTE — PROGRESS NOTES
Physical Therapy  Facility/Department: [address] REHAB  Daily Treatment Note  NAME: Miguelito Shin  : 1945  MRN: [id number]    Date of Service: 2018     Evaluating Therapist: Ricky Pulido P.T.     ROOM: [facility]9521-O  DIAGNOSIS: metastatic disease spine- s/p laminectomy T4-6 (2018)  PRECAUTIONS: Fall Risk, soft TLSO & spinal precautions, PRAFO in bed and when up     Social:  Pt lives with  in a 1 floor plan 2 steps and 0 rails. Prior to admission: Independent PTA       Initial Evaluation  18 AM     PM    Short Term Goals Long Term Goals    Was pt agreeable to Eval/treatment? 2018 Yes NA       Does pt have pain? 8/10 back pain Reported chronic back pain as 6/10.        Bed Mobility  Rolling: Min assist with bedrail  Supine to sit: dependent  Sit to supine: dependent  Scooting: dependent Rolling: Min A  Supine to sit: Max A x2  Sit to supine: NT  Scooting: Mod A  Rolling: sba  Supine to sit: maxA  Sit to supine: maxA  Scooting: maxA Rolling: mod I  Supine to sit: modA  Sit to supine: modA  Scooting: modA   Transfers Sit to stand: Dependent  Sliding Board: Dependent  Stand to sit: Dependent  Stand pivot: Dependent Slide board:  Max A +2 from bed to [facility] with Beasy board  Slide board: Matt Deng board:Pina   Ambulation   NT Patient unable NT   To be assessed as appropriate  To be assessed as appropriate   Walking 10 feet on uneven surface  NT patient unable NT   To be assessed as appropriate  To be assessed as appropriate   Wheel Chair Mobility Min assist with Bilat UE's X 10 feet 175 feet, 100 feet with BUEs with Supervision  Verbal cues for direction and safety with doorways, incline/decline    Modified Independent with bilat UE's X feet level surfaces 500'   Car Transfers NT  NT  Max assist  Mod assist   Stair negotiation: ascended and descended                   NT NT   To be assessed as appropriate  To be assessed as appropriate   Curb Step:   ascended and descended NT NT  To be assessed To be assessed   Picking up object off the floor NT NT  To be assessed To be assessedt   BLE ROM PROM WNL's all aspects PROM WNL's all aspects        BLE Strength Trace Right LE PF/DF, Knee Ext, Hip Ext. Left LE 0/5   Increase Right LE strength to P-/P  Increase Left LE Strength to trace/P- Increase Right LE strength to P+/F-  Increase Left LE strength to P/P+   Balance  Seated static balance F+ with UE support Sitting: Min A  Standing: NA        Date Family Teach Completed NA /son present:  6/13, 6/14, 6/15, 6/21  FT with family:  6/21, 6/22        Is additional Family Teaching Needed? Y or N yes Y (son and DIL to continue to attend)        Hindering Progress LE weakness LE weakness, sitting balance, activity tolerance        PT recommended ELOS 6 weeks          Team's Discharge Plan            Therapist at Team Meeting              Therapeutic Exercise:   AM: GS, QS, APs, hip ABD/ADD with mild manual resistance 10x each. PM: NA    Patient education  Safety with bed mobility, transfers, WC mobility, exercise    Patient response to education:   Pt verbalized understanding Pt demonstrated skill Pt requires further education in this area   Yes  Yes with verbal cues and assist Yes reinforcement     Additional Comments: Patient found in bed on bedpan. Upon return, patient dressed and in bed. Patient rolled multiple times for donning TLSO. Patient with good upper body strength to assist with bed mobility. Patient dependent for donning TLSO. Patient assisted to sitting EOB and initially had poor balance but able to engage BUEs to assist with balance. Patient transferred to [facility] utilizing [address] board and required assist of 2 persons to properly position in [facility]. Donned bilateral PRAFO boots while up in chair. Patient able to self propel WC with verbal cues for direction and negotiating through doorways to protect hands/elbows. Patient able to control WC on small incline/decline in rehab gym with Min A. Patient returned to room following treatment and remained in [facility]. Call light and tray table in reach. Family in room. AM  Time in: 1120  Time out: 1203    PM  Time in:   Time out:     Pt is making fair progress toward established Physical Therapy goals. Continue with physical therapy current plan of care.     Merrill Villasenor, PT, DPT  License [id number]

## 2018-06-25 NOTE — PROGRESS NOTES
Additional Comments: Pt reporting more pain in AM and having a bad night from pain. Pt continued to tolerated all activity despite increased pain. Family present including son for hands on FT. Son managing components of w/c as well as proper b-easy board placement and hand placement during transfer. Son positioning feet during transfer and providing appropriate assistance to complete transfer to and from mat table. In AM session, W/C vendor in to assess patient for fitting of lightweight manual w/c that allows b-easy/slide board transfer. Pt reporting at end of AM session that pain was reduced some and she felt a little bit better. In PM, Dtr present and trainedon proper board placement as well as slide board guarding technique. Dtr Requiring assistance for positioning once back in the chair but otherwise performed b-easy board transfer safely with safe guarding technique and hand placement, as well as adjusting feet throughout transfer. Skin was inspected: intact bilateral heels with prafo removed and placed back on. Positioned in chair with left hip add towel placed. AM  Time in: 1045  Time out: 1130    PM  Time in: 1515  Time out: 1600    Pt is making fair progress toward established Physical Therapy goals. Continue with physical therapy current plan of care.     Giselle Martino DPT  [id number]

## 2018-06-25 NOTE — PROGRESS NOTES
tumor). Plan:   Encourage ambulation and up to wheel chair.  (Working at chair level  Pain well-controlled  Tolerating therapy well).        John Rocha MD  6/25/2018

## 2018-06-25 NOTE — PROGRESS NOTES
OCCUPATIONAL THERAPY DAILY NOTE    Date:2018  Patient Name: Cayden Caldera  MRN: [id number]  : 1945  Room: 5517/5517-A     Patient Active Problem List   Diagnosis    S/P hip replacement    Weakness of both legs    Metastatic cancer to spine [facility])    Spinal cord neoplasm       Precautions: fall risk, TLSO & spinal precautions. Functional Assessment:   Date Status AE  Comments   Feeding 18 Set up      Grooming 18 S/U     Bathing 18 Min A LH sponge    UB Dressing 18 S/U- shirt  Max A- TLSO  Pt. Rolled side to side in bed to assist in donning TLSO. Readjusted brace several times in the clinic. Family instructed on donning of TLSO. LB Dressing 18 Mod A  Reacher  Sock aid    Homemaking 18 S/U  mod assist- Kitchen  w/c level and reacher       Functional Transfers / Balance:   Date Status DME  Comments   Sit Balance 18 Min A     Stand Balance 18       [x] Tub  [] Shower   Transfer 18  Mod A x1 and SBA X1 Ext tub bench    Commode   Transfer 18 Mod A x1 and SBA of 1 Drop arm commode and transfer board   completed transfer with patient with SBA of therapist.    Functional   Mobility 18 S   w/c level    Other: supine to w/c     Bed rolling      Bed scooting    Bed to w/c  18 Dep/total      Min/mod assist    Max assist    Mod A Rene lift                Transfer board      Functional Exercises / Activity:  Func ladder climb ax with 3# dowel to increase AROM and strength of BUE's. 15 reps. Tabletop ax with 5# medicine ball and incline wedge to increase AROM and strength of BUE's and trunk stability. 20 reps   Red bernardo bar to increase forearm and  strength of BUE's. 20 reps x4 planes. Dumbbell ex's with 3# wt's- bicep curls and tricep extensions to increase BUE strength. 15 reps x3. Mr. Caresse Mortimer 2# wt to increase forearm and wrist strength of BUE's. 5 reps.   W/C push ups to increase BUE strength

## 2018-06-25 NOTE — PROGRESS NOTES
installed. Sitting unsupported and able to complete dynamic activity. FT going well with son and dtr with b-easy board. Barriers to discharge:  Patient at this time will require 24 hour supervision for assist with all transfers. Ramp being installed to home (2 step barrier to enter)  Additional comments:  Pt with increasing return to bilateral LE, as well as trunk leading to assist x1 consistently with family during b-easy board transfers. FT continuing during morning session with son/dtr/DIL on b-easy board. Plan to progress to slide board transfer. Will continue to get patient up at ww (assist x2 with third blocking knees) as trunk controlled improved. DME:  Custom chair fitting process began on 6/25/18 with lightweight manual chair, hospital bed, gait belt, slide board  After Care:  YOLIS Mora DPT  [id number]    Date:  6/18/18  Supporting factors:  Very motivated and very strong family support. Family looking into w/c ramp, trunk strength/sitting balance improving (right side> left side) . Slight return in right LE quad strength. Increased mobility with w/c propulsion with bilateral UE. Barriers to discharge:  Continuing to require assist x2 for safety with b-easy board (will progress and trial assist x1 this week). Step barrier pending w/c ramp addition to home. Limited return noted to left LE at this time. Additional comments:  Pt demonstrating compensatory balance technique with bilateral UE in sitting, as well as return in trunk strength with noted improvement in static/dynamic sitting balance activity. Pt at this time requiring assist x2 with b-easy board transfer (improvement in A required from last week) and will trial to progress to A x 1 pending safety. At this time, will require 24 hr sup at d/c.   DME:  TBD  After Care:  YOLIS Mora DPT  [id number]    Date:  6/11/18  Supporting factors:   Motivated, trace strength bilateral ankle/left quad, sensation returning, strong family support  Barriers to discharge: Activity tolerance, poor strength in bilateral LE, poor sitting balance (static and dynamic)  Additional comments:  Pt very motivated however limited by above barriers. Will emphasize ROM/weight bearing to bilateral LE with ALLY lift as well as slide board transfers pending trunk/sitting balance improvement and progress as appropriate. DME:  TBA  After Care:  TBA    6/12/18: Please try B-easy board with transfers. Patient will require ramp at discharge, please determine if portable ramp is feasible. FT to be scheduled daily with  due to burden of care.   Herb Bowie, PT, DPT [id number]    ADAMA BolesT  [id number]

## 2018-06-26 NOTE — PROGRESS NOTES
OCCUPATIONAL THERAPY DAILY NOTE    Date:2018  Patient Name: Leah Yung  MRN: [id number]  : 1945  Room: 17/5517-A     Patient Active Problem List   Diagnosis    S/P hip replacement    Weakness of both legs    [facility]ast[facility])    Spinal cord neoplasm       Precautions: fall risk, TLSO & spinal precautions. Functional Assessment:   Date Status AE  Comments   Feeding 18 Set up      Grooming 18 S/U     Bathing 18 Min A LH sponge    UB Dressing 18 S/U- shirt  Max A- TLSO   Readjusted TLSO brace several times in the clinic. LB Dressing 18 Mod A  Reacher  Sock aid Pt. Sat on edge of mat unsupported while donning/doffing shorts and socks with AE. Pt. Donned shorts to thighs. (Increased time to complete)     Homemaking 18 S/U  mod assist- Kitchen  w/c level and reacher       Functional Transfers / Balance:   Date Status DME  Comments   Sit Balance 18 Min A     Stand Balance 18       [x] Tub  [] Shower   Transfer 18  Mod A x1 and SBA X1 Ext tub bench    Commode   Transfer 18 Mod A x1 and SBA of 1 Drop arm commode and transfer board   completed transfer with patient with SBA of therapist.    Functional   Mobility 18 S   w/c level    Other: supine to w/c     Bed rolling      Bed scooting    Bed to w/c     W/c<>Mat 18 Dep/total      Min/mod assist    Max assist    Mod A    Min/Mod A Rene lift                Transfer board    Transfer board                   Pt. Completed transfer with son with SBA of therapist.      Functional Exercises / Activity:  Func FM leisure ax with 1# wrist wts to increase AROM, strength and [address]. Pt. Seated unsupported to increase trunk bal/end. Bal-F  Red bernardo bar to increase forearm and  strength of BUE's. 25 reps x4 planes.      Sensory / Neuromuscular Re-Education:      Cognitive Skills:   Status Comments   Problem   Solving fair Memory fair    Sequencing fair    Safety fair      Visual Perception:    Education:  Pt/family educated on safety with w/c <>mat transfers and AE for LB dressing. [x] Family teach completed on: 6/13/18 Pt with son and  present for therapy session. Educated with regards to donning TLSO supine in bed,  LE dressing techniques supine in bed for EC/WS techniques, clothing management with regards to toileting when using francis to transfer to commode. 6/14/18 with son in law and  of patient. Family observed commode transfer with transfer board. Discussed ADL levels and safety and DME for home. 6/21/18- Son performed commode transfer with patient today with SBA of therapist.   Pain Level: 8/10  back     Additional Notes:       Patient has made fair   progress during treatment sessions toward set goals. Therapy emphasis to obtain goals:  Balance, transfers, ADL's, strength/endurance. Functional mobility, wheel chair mobility, positioning, safety education and home mgmt. [x] Continue with current OT Plan of care. [] Prepare for Discharge     DISCHARGE RECOMMENDATIONS  Recommended DME:  Drop arm 3 in 1. Post Discharge Care:   []Home Independently  [x]Home with 24hr Care / Supervision []Home with Partial Supervision []Home with Home Health OT []Home with Out Pt OT []Other: ___   Comments:         Time in Time out Tx Time Breakdown  Variance:   First Session  [phone] [x] Individual Tx-35 mins   [] Concurrent Tx-    [] Co-Tx -   [] Group Tx -   [x] Time Missed -10 mins        Pt. With phlebotomist for 10 mins   Second Session [phone] [] Individual Tx-   [x] Concurrent Tx-45 mins  [] Co-Tx -   [] Group Tx -   [] Time Missed -     Third Session    [] Individual Tx-   [] Concurrent Tx -  [] Co-Tx -   [] Group Tx -   [] Time Missed -         Total Tx Time 90 mins   Glean [place] Rising  [place]/L [id number]  I have read the above note and agree with the documentation.   Gisella Rios OTR/L

## 2018-06-26 NOTE — PROGRESS NOTES
6/26/2018 12:17 PM   [facility]     Subjective:     80 yo female known to Dr Tierney Brown with hx of stage iv lung adenocarcinoma with mets to T5. She received palliative RT to the T5 in Aug-Sept 2017. Disease progression in December and stated on Keytruda. initially admitted  with leg weakness/numbness for 2 weeks.     S/P :  DECOMPRESSIVE THORACIC LAMINECTOMY T4, T5, T6    Improved pain control  Sig less productive cough            Current meds:    sodium chloride flush 0.9 % injection 10 mL PRN   sodium chloride flush 0.9 % injection 10 mL BID   ipratropium-albuterol (DUONEB) nebulizer solution 1 ampule 4x daily   lactulose (CHRONULAC) 10 GM/15ML solution 10 g Daily   fluticasone (FLONASE) 50 MCG/ACT nasal spray 1 spray Daily   ondansetron (ZOFRAN) injection 4 mg Q6H PRN   oxyCODONE-acetaminophen (PERCOCET) 5-325 MG per tablet 1 tablet Q4H PRN   pantoprazole (PROTONIX) tablet 40 mg QAM AC   polyethylene glycol (GLYCOLAX) packet 17 g Daily   sennosides-docusate sodium (SENOKOT-S) 8.6-50 MG tablet 2 tablet Daily PRN   therapeutic multivitamin-minerals 1 tablet Daily   vitamin B and C (TOTAL B-C) 1 tablet Daily   vitamin D tablet 2,000 Units Daily   acetaminophen (TYLENOL) tablet 650 mg TID PRN   enoxaparin (LOVENOX) injection 40 mg Daily     Todays labs:    Recent Labs      06/26/18   1014   WBC  10.5   HGB  12.4   PLT  394   BUN  9   CREATININE  0.4*                                                     Objective:     Patient Vitals for the past 8 hrs:   BP Temp Temp src Pulse Resp SpO2   06/26/18 0806 - - - - - 93 %   06/26/18 0557 116/60 97.9 °F (36.6 °C) Temporal 84 16 -             Impression:     Patient Active Problem List   Diagnosis    S/P hip replacement    Weakness of both legs    Metastatic cancer [facility] spine [facility])    Spinal cord neoplasm           Plan:     Plan for follow up with Dr Tierney Brown at [facility] after discharge

## 2018-06-26 NOTE — PROGRESS NOTES
06/26/18 1405   Attendance   Activity Games   Participation Active participation   [place] Demonstrates ability to complete social goals   Social Skills Cooperates with others in group activity   Leisure Education Demonstrates knowledge of benefits of leisure involvement  (Identified being with everyone as a benefit)   Time Spent With Patient   Minutes 25        06/26/18 1405   Attendance   Activity Games   Participation Active participation   [place] Demonstrates ability to complete social goals   Social Skills Cooperates with others in group activity   Leisure Education Demonstrates knowledge of benefits of leisure involvement  (Identified being with everyone as a benefit)   Time Spent With Patient   Minutes 25

## 2018-06-26 NOTE — PLAN OF CARE
Problem: Falls - Risk of:  Goal: Will remain free from falls  Will remain free from falls   Outcome: Met This Shift      Problem: Risk for Impaired Skin Integrity  Goal: Tissue integrity - skin and mucous membranes  Structural intactness and normal physiological function of skin and  mucous membranes.    Outcome: Met This Shift      Problem: Pain:  Goal: Control of acute pain  Control of acute pain   Outcome: Met This Shift

## 2018-06-26 NOTE — PROGRESS NOTES
mechanics as well as adjusting feet as appropriate. Son educated on proper scooting/weight shifting techniques with contralateral lean and shift of opposite/deweight hip for positioning/weight shifting. Reviewed hand placement and body mechanics, as well as log roll technique to complete bed mobility. Son completed bed mobility assist x1 into and out of bed but rec having second present for supine to sitting if possible with son verbalizing understanding. Son educated on proper foot placement and technique to roll with log roll in bed and returned demonstration of placement of TLSO in supine utilizing elevating feature of bed for his own body mechanics. Son reporting feeling more comfortable with b-easy board transfer this date. Occasional cues from this therapist for pt to lean forward more and for son to allow patient to lean more ant. To de-weight glut during scooting/slide board. Will continue to educate during AM sessions with son. In PM,  and PATY present for b-easy board training mat to and from w/c.  requiring vcA for placement of board and completed slide board transfer back and forth to mat with occasional cues to allow patient to lean forward. During slide board with b-easy board back to w/c, pt began to slide forward as board ended up sitting on w/c wheel requiring PT assistance to correct.  and PATY assisting in log roll technique out of bed, and  trained on proper leg management for log roll into bed.  could demonstrate improvement in body mechanics as he bent forward at hips and did not bend at knees throughout transfer. Skin was inspected: ankles intact pre/post with prafos doffed and donned back at end of session      AM  Time in: 1045  Time out: 1130    PM  Time in: 1515  Time out: 1600    Pt is making fair progress toward established Physical Therapy goals. Continue with physical therapy current plan of care.     Keke Mcdaniel, MOISES  [id number]

## 2018-06-26 NOTE — PLAN OF CARE
Problem: Risk for Impaired Skin Integrity  Goal: Tissue integrity - skin and mucous membranes  Structural intactness and normal physiological function of skin and  mucous membranes.    Outcome: Met This Shift      Problem: Pain:  Goal: Control of acute pain  Control of acute pain   Outcome: Met This Shift

## 2018-06-26 NOTE — CARE COORDINATION
SW spoke with Rich form OT they report that pt is currently using bezy board for transfers. SW to check with Merlin Hippo at end of the week to see if that is what will need ordered or if pt will use slide board. SW contacted  DME and we do not carry the item. SW will need to research where to get item if that is what is needed.       John Bhagat  6/26/2018

## 2018-06-26 NOTE — PROGRESS NOTES
Ulysses Luna is a 81 y.o. female patient.     Current Facility-Administered Medications   Medication Dose Route Frequency Provider Last Rate Last Dose    sodium chloride flush 0.9 % injection 10 mL  10 mL Intravenous PRN Alicia Webber MD   10 mL at 06/25/18 1833    sodium chloride flush 0.9 % injection 10 mL  10 mL Intravenous BID Kennedy A MD Karen   10 mL at 06/25/18 1006    ipratropium-albuterol (DUONEB) nebulizer solution 1 ampule  1 ampule Inhalation 4x daily Alicia Webber MD   1 ampule at 06/26/18 0805    lactulose (CHRONULAC) 10 GM/15ML solution 10 g  10 g Oral Daily Kennedy A MD Karen   10 g at 06/25/18 1000    fluticasone (FLONASE) 50 MCG/ACT nasal spray 1 spray  1 spray Each Nare Daily Kennedy A MD Karen   1 spray at 06/21/18 0842    ondansetron (ZOFRAN) injection 4 mg  4 mg Intravenous Q6H PRN Jared Mckeon,         oxyCODONE-acetaminophen (PERCOCET) 5-325 MG per tablet 1 tablet  1 tablet Oral Q4H PRN [place], DO   1 tablet at 06/26/18 0756    pantoprazole (PROTONIX) tablet 40 mg  40 mg Oral QAM AC Jared Mckeon, DO   40 mg at 06/26/18 9614    polyethylene glycol (GLYCOLAX) packet 17 g  17 g Oral Daily Jared Mckeon, DO   17 g at 06/25/18 1000    sennosides-docusate sodium (SENOKOT-S) 8.6-50 MG tablet 2 tablet  2 tablet Oral Daily PRN [place], DO   2 tablet at 06/19/18 6012    therapeutic multivitamin-minerals 1 tablet  1 tablet Oral Daily Jared Mckeon, DO   1 tablet at 06/25/18 1001    vitamin B and C (TOTAL B-C) 1 tablet  1 tablet Oral Daily Jared Mckeon, DO   1 tablet at 06/25/18 1001    vitamin D tablet 2,000 Units  2,000 Units Oral Daily Jared Mckeon, DO   2,000 Units at 06/25/18 1001    acetaminophen (TYLENOL) tablet 650 mg  650 mg Oral TID PRN Emma Gutierrez MD        enoxaparin (LOVENOX) injection 40 mg  40 mg Subcutaneous Daily Emma Gutierrez MD   40 mg at 06/25/18 0946     Allergies   Allergen Reactions   

## 2018-06-26 NOTE — PATIENT CARE CONFERENCE
[address] [place], [place] [address]Th [facility] ACUTE REHABILITATION  TEAM CONFERENCE NOTE/PATIENT PLAN OF CARE    Date: 2018  Admission date: 2018  Patient Name: Chidi Winter        MRN: [id number]    : 1945  (81 y.o.)  Gender: female   Rehab diagnosis/surgery with date:  Metastatic cancer to spinal cord with compression at T4-T6, decompressive thoracic laminectomy done 18  Impairment Group Code:  4.130      MEDICAL/FUNCTIONAL HISTORY/STATUS:  measured for custom wheelchair on Monday per [place] and Mobility    Consultations/Labs/X-rays: Chem panel and CBC today      MEDICATION UPDATE:  no recent changes      NURSING FIMS:    Bowel:   Current level: dependent  Short term bowel goal:  Modified independent  Long term bowel goal: Modified independent    Bladder:   Current level: dependent-indwelling catheter  Short term bladder goal: Modified independent  Long term bladder goal: Modified independent    Toilet Hygiene:   Current level : dependent  Short term Toilet hygiene goal: min assist  Long term toilet hygiene goal:  Modified independent    Skin integrity: back incision healing  Pain: percocet for back pain    NUTRITION    Diet  general  Liquid consistency   thin    SOCIAL INFORMATION:  Lives with: spouse, Tin Mistry  Prior community services:  None noted  Home Architecture:  Ranch style with 2 entry steps no rails  Prior Level of function:  independent  DME:  Wheeled walker, bedside commode    FAMILY / PATIENT EDUCATION:  ongoing, they are doing well, will NOT need a RED lift    PHYSICAL THERAPY    Bed mobility:   Current level: rollling, min assist, supin to sit max assist, sit to supine max assist, scooting max assist  Short term bed mobility goal: max-standby assist  Long term bed mobility goal: mod assist, rolling Modified independent    Chair/bed transfers:  Current level: sit to stands  with wheeled walker max assist of 2, mod assist for transfer board transfers  Short term Chair/bed prognosis      Discharge Plan   Estimated Length of Stay: 7/6            Destination: home  Services at Discharge: home health  for PT, OT, aide  Equipment at Discharge: hospital bed, , transfer board, getting a cushion for wheelchair, gait belt, 3 in 1, tub bench      INTERDISCIPLINARY TEAM/PHYSICIAN RECOMMENDATION AND/OR REVISIONS OF PLAN OF CARE:  Continue intensive family education, equipment ordering      I approve the established interdisciplinary plan of care as documented within the medical record of Lizbet Fernanda. Please see team conference signature page for those in attendance.     Electronically signed by Ugo Gambino RN  on 6/26/2018 at 8:51 AM

## 2018-06-27 NOTE — PROGRESS NOTES
Other Other (See Comments)     Seasonal allergies, cough and sinus drainage, sneeze     Active Problems:    Spinal cord neoplasm  Resolved Problems:    * No resolved hospital problems. *    Blood pressure (!) 111/55, pulse 94, temperature 97.9 °F (36.6 °C), temperature source Temporal, resp. rate 16, height 4' 11\" (1.499 m), weight 163 lb 1.6 oz (74 kg), SpO2 97 %, not currently breastfeeding. Subjective:  Symptoms:  Stable. Diet:  Adequate intake. Activity level: Impaired due to weakness. Pain:  She complains of pain that is mild. Objective:  General Appearance: In no acute distress. Vital signs: (most recent): Blood pressure (!) 111/55, pulse 94, temperature 97.9 °F (36.6 °C), temperature source Temporal, resp. rate 16, height 4' 11\" (1.499 m), weight 163 lb 1.6 oz (74 kg), SpO2 97 %, not currently breastfeeding. Vital signs are normal.    Output: Producing urine and producing stool. Lungs:  Normal effort and normal respiratory rate. Breath sounds clear to auscultation. Heart: Normal rate. Regular rhythm. S1 normal and S2 normal.    Abdomen: Abdomen is soft. Bowel sounds are normal.   There is no abdominal tenderness. Extremities: Normal range of motion. Neurological: Patient is alert. (2/5 hips  3/5 quads). Assessment:      Date   Status   AE    Comments     Feeding   6/23/18   Set up              Grooming   6/22/18   S/U             Bathing   6/22/18   Min A LH sponge         UB Dressing   6/26/18   S/U- shirt  Max A- TLSO      Readjusted TLSO brace several times in the clinic. LB Dressing   6/26/18   Mod A    Reacher    Sock aid Pt. Sat on edge of mat unsupported while donning/doffing shorts and socks with AE. Pt. Donned shorts to thighs.  (Increased time to complete)        Homemaking   6/19/18   S/U    mod assist- Kitchen    w/c level and reacher              Functional Transfers / Balance:      Date Status DME  Comments   Sit Balance 6/26/18 Min A       Stand Balance 6/21/18         [x] Tub  [] Shower   Transfer 6/20/18  Mod A x1 and SBA X1 Ext tub bench     Commode   Transfer 6/25/18 Mod A x1 and SBA of 1 Drop arm commode and transfer board   completed transfer with patient with SBA of therapist.    Functional   Mobility 6/21/18 S    w/c level     Other: supine to w/c      Bed rolling        Bed scooting     Bed to w/c      W/c<>Mat 6/23/18 6/23/18 6/9/18 6/21/18 6/26/18 Dep/total        Min/mod assist     Max assist     Mod A     Min/Mod A Rene lift                       Transfer board     Transfer board        Assessment:    Condition: In stable condition. Improving. (Spinal cord tumor). Plan:   Up to wheel chair. (Improving with wheelchair functioning  Further family teaching  Discussed with Dr. Guevara Marin  Can shower without brace).        Ivette Nagy MD  6/27/2018

## 2018-06-27 NOTE — PROGRESS NOTES
Total Tx Time 90 mins   Leni Gayle Rising  [place]/L [id number]      I have read & agree with the above status.     Norm Kershaw OTR/L [id number]

## 2018-06-27 NOTE — PROGRESS NOTES
Classification: BMI 30.0 - 34.9 Obese Class I  · Comparative Standards (Estimated Nutrition Needs):  · Estimated Daily Total Kcal: 5743-1147  · Estimated Daily Protein (g):     Estimated Intake vs Estimated Needs: Intake Less Than Needs    Nutrition Risk Level: Moderate    Nutrition Interventions:   Continue current diet, Continue current ONS  Continued Inpatient Monitoring, Education Initiated, Coordination of Care    Nutrition Evaluation:   · Evaluation: Goals set   · Goals: pt to consume >75% of meals and ONS    · Monitoring: Meal Intake, Supplement Intake, Diet Tolerance, Skin Integrity, Wound Healing, Fluid Balance, Ascites/Edema, Weight, Comparative Standards, Pertinent Labs    See Adult Nutrition Doc Flowsheet for more detail.      Electronically signed by Alla Mansfield on 6/27/18 at 2:40 PM    Contact Number: [phone]

## 2018-06-27 NOTE — PROGRESS NOTES
Physical Therapy  Facility/Department: [address] REHAB  Daily Treatment Note  NAME: Sanjana Nelson  : 1945  MRN: [id number]    Date of Service: 2018     Evaluating Therapist: Baylee Godwin P.T.     ROOM: [address] [place] [address]  DIAGNOSIS: metastatic disease spine- s/p laminectomy T4-6 (2018)  PRECAUTIONS: Fall Risk, soft TLSO & spinal precautions, PRAFO in bed and when up     Social:  Pt lives with  in a 1 floor plan 2 steps and 0 rails. Prior to admission: Independent PTA       Initial Evaluation  18 AM     PM    Short Term Goals Long Term Goals    Was pt agreeable to Eval/treatment? 2018 Yes yes       Does pt have pain? 8/10 back pain c/o mild back pain 3/10 back pain       Bed Mobility  Rolling: Min assist with bedrail  Supine to sit: dependent  Sit to supine: dependent  Scooting: dependent Rolling: cgA/Pina  Supine to sit: maxA x2  Sit to supine: maxA x1  Scooting: modA Rolling: cgA  Supine to sit: maxA x2  Sit to supine: maxA x1  Scooting: modA Rolling: sba  Supine to sit: maxA  Sit to supine: maxA  Scooting: maxA Rolling: mod I  Supine to sit: modA  Sit to supine: modA  Scooting: modA   Transfers Sit to stand: Dependent  Sliding Board: Dependent  Stand to sit: Dependent  Stand pivot: Dependent Sit<>stand: NT  Stand pivot: NT  Slide board: modA x1 with b-easy board with 2 in board for feet to touch.  Sit<>stand:   Stand pivot:   Slide board: modA x1 with beasy board and 2 in board (w/c to and from mat table) Slide board: Yessy Posada board:Pina   Ambulation   NT Patient unable NT    To be assessed as appropriate  To be assessed as appropriate   Walking 10 feet on uneven surface  NT patient unable NT    To be assessed as appropriate  To be assessed as appropriate   Wheel Chair Mobility Min assist with Bilat UE's X 10 feet 200' sup with bilateral UE NT   Modified Independent with bilat UE's X feet level surfaces 500'   Car Transfers NT  NT   Max assist  Mod assist   Stair negotiation: ascended and descended                   NT NT    To be assessed as appropriate  To be assessed as appropriate   Curb Step:   ascended and descended NT NT   To be assessed To be assessed   Picking up object off the floor NT NT   To be assessed To be assessedt   BLE ROM PROM WNL's all aspects PROM WNL's all aspects  PROM WNL's all aspects       BLE Strength Trace Right LE PF/DF, Knee Ext, Hip Ext. Left LE 0/5  Left LE: hip 0/5, knee 2-/5, ankle 2-/5  Right LE: hip 1/5, knee 3-/5, ankle 3-/5 Increase Right LE strength to P-/P  Increase Left LE Strength to trace/P- Increase Right LE strength to P+/F-  Increase Left LE strength to P/P+   Balance  Seated static balance F+ with UE support Sitting: static: sup  Dynamic: cgA/sba at EOB  Standing: NT Static Sitting: cga/sba  Dynamic Sitting: Min A at EOB  Standing: dep       Date Family Teach Completed NA /son present:  6/13, 6/14, 6/15, 6/21  FT with family:  6/21, 6/22, 6/25, 6/26, 6/27        Is additional Family Teaching Needed? Y or N yes Y (son and DIL to continue to attend) Y (son and DIL to continue to attend)       Hindering Progress LE weakness LE weakness, sitting balance, activity tolerance LE weakness, sitting balance, activity tolerance       PT recommended ELOS 6 weeks          Team's Discharge Plan             Therapist at Team Meeting               Therapeutic Exercise:   AM: n/a    PM    Patient education  Pt, son, son in law, dtr in law educated on slide/b-easy board placement, transfer w/c to and from mat, brace placement in supine, rolling with log roll technique, scooting patient backwards with lateral weight shift in sitting, log roll technique into and out of bed    Patient response to education:   Pt verbalized understanding Pt demonstrated skill Pt requires further education in this area   yes Yes with occasional cues Yes, family to attend daily     Additional Comments: Son, DIL, PATY, and  present for FT.  Son and DIL educated on above education aspects with returned demonstration of most aspects. Son requiring cueing for log roll in bed to place TLSO but with cueing occasionally was able to don and doff brace properly. Son becoming increasingly comfortable with b-easy board transfer. DIL performed slide board and requiring cueing throughout for board placement as well as scooting back in sitting as pt slide forward during transfer. Educated to restart transfer if needed and for transfers to be completed slowly to allow for most patient participation and to ensure she is safe throughout transfer. Again, recommended assist x2 for supine to sitting EOB to maintain spinal precautions with log roll technique. Family aware and returned demonstration. Will continue education with family in AM sessions, as well as PM when present. In PM, reviewed bed control use and mobility in bed with utilizing bed controls/graviity to assist. Reviewed rolling in bed as well as rom to all LE with  with returned demonstration. PATY and  reviewed slide beasy board transfer with  providing most of the assistance. On soft surface,  requiring cueing to assist scooting patient back during slide board as she began to slide forward. Reviewed positioning in bed for pressure relief, as well as placing draw sheet under by rolling side to side with returned demonstration. On softer hospital bed surface, reviewed log roll technique into bed with  providing assistance from legs, as well as utilizing elevating portion of hob . Will continue to train with various family members on b-easy board transfer. Skin was inspected: heels intact, intact noted with prafo removed and placed back at end of session. AM  Time in: 1045  Time out: 1130    PM  Time in: 1515  Time out: 1600    Pt is making fair+ progress toward established Physical Therapy goals. Continue with physical therapy current plan of care.     Hermes Robertson DPT  [id number]

## 2018-06-27 NOTE — PROGRESS NOTES
6/27/2018 10:01 AM   [facility]     Subjective:     82 yo female known to Dr Noah Paul with hx of stage iv lung adenocarcinoma with mets to T5. She received palliative RT to the T5 in Aug-Sept 2017. Disease progression in December and stated on Keytruda. initially admitted  with leg weakness/numbness for 2 weeks.     S/P :  DECOMPRESSIVE THORACIC LAMINECTOMY T4, T5, T6    Improved pain control  Sig less productive cough            Current meds:    sodium chloride flush 0.9 % injection 10 mL PRN   sodium chloride flush 0.9 % injection 10 mL BID   ipratropium-albuterol (DUONEB) nebulizer solution 1 ampule 4x daily   lactulose (CHRONULAC) 10 GM/15ML solution 10 g Daily   fluticasone (FLONASE) 50 MCG/ACT nasal spray 1 spray Daily   ondansetron (ZOFRAN) injection 4 mg Q6H PRN   oxyCODONE-acetaminophen (PERCOCET) 5-325 MG per tablet 1 tablet Q4H PRN   pantoprazole (PROTONIX) tablet 40 mg QAM AC   polyethylene glycol (GLYCOLAX) packet 17 g Daily   sennosides-docusate sodium (SENOKOT-S) 8.6-50 MG tablet 2 tablet Daily PRN   therapeutic multivitamin-minerals 1 tablet Daily   vitamin B and C (TOTAL B-C) 1 tablet Daily   vitamin D tablet 2,000 Units Daily   acetaminophen (TYLENOL) tablet 650 mg TID PRN   enoxaparin (LOVENOX) injection 40 mg Daily     Todays labs:    Recent Labs      06/26/18   1014   WBC  10.5   HGB  12.4   PLT  394   BUN  9   CREATININE  0.4*                                                     Objective:     Patient Vitals for the past 8 hrs:   BP Temp Temp src Pulse Resp   06/27/18 0715 (!) 111/55 97.9 °F (36.6 °C) Temporal 94 16             Impression:     Patient Active Problem List   Diagnosis    S/P hip replacement    Weakness of both legs    Metastatic cancer [facility] spine [facility])    Spinal cord neoplasm           Plan:     Plan for follow up with Dr Noah Paul at [facility] after discharge

## 2018-06-27 NOTE — CARE COORDINATION
TIGRE spoke with pt's son and PATY,  they are starting install on ramp today 6/27. Ramp should be done by end of week. Pt's PATY is requesting that pt d/c on a Tues, Wed or Thursday so he can be around to assist.  Asked if we were still thinking 7/6 if we could change it to 7/5. If pt needs more time then requesting 7/10-12 for d/c. He will be  for all DME deliveries. Karen Crew [phone]. TIGRE spoke with PT regarding DME that pt is using. Pt is currently using a [employer] for transfers. SW called  [employer]ME, [employer] and [employer]. Also contacted the local rep for [employer] board at [employer] in [place] [phone]. This does not appear to be an insurance covered item. HM, [employer] and [employer] do not supply or order item. [employer] can order item but they do not submit through insurance, they are a private pay company. TIGRE found the item on [employer] it is 200. TIGRE advised [facility] about this being a private pay item and he reported he would like SW to order the insurance covered slideboard at [employer] and they will purchase a [employer] from [employer] on their own.           Cierra Toth  6/27/2018

## 2018-06-28 NOTE — PLAN OF CARE
remain free from infection  Will remain free from infection   Outcome: Met This Shift      Problem: Respiratory:  Goal: Respiratory status will improve  Respiratory status will improve   Outcome: Met This Shift      Problem: Sensory:  Goal: Pain level will decrease  Pain level will decrease    Outcome: Ongoing    Goal: Satisfaction with pain management regimen will improve  Satisfaction with pain management regimen will improve   Outcome: Ongoing      Problem: Skin Integrity:  Goal: Risk for impaired skin integrity will decrease  Risk for impaired skin integrity will decrease   Outcome: Ongoing      Problem: Urinary Elimination:  Goal: Ability to achieve and maintain adequate urine output will improve  Ability to achieve and maintain adequate urine output will improve   Outcome: Met This Shift      Problem: Pain:  Goal: Control of acute pain  Control of acute pain   Outcome: Ongoing    Goal: Control of chronic pain  Control of chronic pain   Outcome: Ongoing    Goal: Pain level will decrease  Pain level will decrease    Outcome: Ongoing

## 2018-06-28 NOTE — PROGRESS NOTES
Physical Therapy  Facility/Department: [address] REHAB  Daily Treatment Note  NAME: Savana Figueroa  : 1945  MRN: [id number]    Date of Service: 2018     Evaluating Therapist: Erika Reveles P.T.     ROOM: [address] [place] [address]  DIAGNOSIS: metastatic disease spine- s/p laminectomy T4-6 (2018)  PRECAUTIONS: Fall Risk, soft TLSO & spinal precautions, PRAFO in bed and when up     Social:  Pt lives with  in a 1 floor plan 2 steps and 0 rails. Prior to admission: Independent PTA       Initial Evaluation  18 AM     PM    Short Term Goals Long Term Goals    Was pt agreeable to Eval/treatment? 2018 Yes yes       Does pt have pain? 8/10 back pain c/o mild back pain 10 back pain       Bed Mobility  Rolling: Min assist with bedrail  Supine to sit: dependent  Sit to supine: dependent  Scooting: dependent Rolling: cgA/Pina  Supine to sit: maxA x2  Sit to supine: maxA x1  Scooting: modA Rolling: cgA  Supine to sit: maxA x2  Sit to supine: maxA x1  Scooting: modA Rolling: sba  Supine to sit: maxA  Sit to supine: maxA  Scooting: maxA Rolling: mod I  Supine to sit: modA  Sit to supine: modA  Scooting: modA   Transfers Sit to stand: Dependent  Sliding Board: Dependent  Stand to sit: Dependent  Stand pivot: Dependent Sit<>stand: NT  Stand pivot: NT  Slide board: modA x1 with b-easy board with 2 in board for feet to touch.  Sit<>stand: maxA x2 with standard walker  Stand pivot: maxA x2 with standard walker  Slide board: modA x1 with slide board and 2 in board (w/c to and from mat table) Slide board: Yanira Govea board:Pina   Ambulation   NT Patient unable NT    To be assessed as appropriate  To be assessed as appropriate   Walking 10 feet on uneven surface  NT patient unable NT    To be assessed as appropriate  To be assessed as appropriate   Wheel Chair Mobility Min assist with Bilat UE's X 10 feet 200' sup with bilateral UE NT   Modified Independent with bilat UE's X feet level surfaces 500'   Car Transfers NT  NT   Max assist  Mod assist   Stair negotiation: ascended and descended                   NT NT    To be assessed as appropriate  To be assessed as appropriate   Curb Step:   ascended and descended NT NT   To be assessed To be assessed   Picking up object off the floor NT NT   To be assessed To be assessedt   BLE ROM PROM WNL's all aspects PROM WNL's all aspects  PROM WNL's all aspects       BLE Strength Trace Right LE PF/DF, Knee Ext, Hip Ext. Left LE 0/5  Left LE: hip 0/5, knee 2-/5, ankle 2-/5  Right LE: hip 1/5, knee 3-/5, ankle 3-/5 Increase Right LE strength to P-/P  Increase Left LE Strength to trace/P- Increase Right LE strength to P+/F-  Increase Left LE strength to P/P+   Balance  Seated static balance F+ with UE support Sitting: static: sup  Dynamic: cgA/sba at EOB  Standing: NT Static Sitting: sba/sup  Dynamic Sitting: cgA at EOB  Standing: maxA x2       Date Family Teach Completed NA /son present:  6/13, 6/14, 6/15, 6/21  FT with family:  6/21, 6/22, 6/25, 6/26, 6/27, 6/28        Is additional Family Teaching Needed?   Y or N yes Y (son and DIL to continue to attend) Y (son and DIL to continue to attend)       Hindering Progress LE weakness LE weakness, sitting balance, activity tolerance LE weakness, sitting balance, activity tolerance       PT recommended ELOS 6 weeks          Team's Discharge Plan             Therapist at Team Meeting               Therapeutic Exercise:   AM: n/a    PM: supine gs, qs, hip add, aarom saq/hip ext/flexion, ap to increase strength Stevan Felton strength    Patient education  Pt , son (uche), and DIL educated on b-easy board placement, w/c component management, slide board transfer, scooting by lateral weight shift and moving contralateral LE, bed mobility with log roll technique, TLSO placement and movement, pressure relief in w/c (lifitng leg, anterior, laterally, w/c push up)    Patient response to education:   Pt verbalized understanding Pt demonstrated skill

## 2018-06-28 NOTE — PROGRESS NOTES
Shelly Rodríguez is a 81 y.o. female patient.     Current Facility-Administered Medications   Medication Dose Route Frequency Provider Last Rate Last Dose    sodium chloride flush 0.9 % injection 10 mL  10 mL Intravenous PRN Joel Ghotra MD   10 mL at 06/25/18 1833    sodium chloride flush 0.9 % injection 10 mL  10 mL Intravenous BID Joel Ghotra MD   10 mL at 06/26/18 0836    ipratropium-albuterol (DUONEB) nebulizer solution 1 ampule  1 ampule Inhalation 4x daily Joel Ghotra MD   1 ampule at 06/28/18 0752    lactulose (CHRONULAC) 10 GM/15ML solution 10 g  10 g Oral Daily Kennedy Jones MD   10 g at 06/27/18 0831    fluticasone (FLONASE) 50 MCG/ACT nasal spray 1 spray  1 spray Each Nare Daily Joel Ghotra MD   1 spray at 06/21/18 0842    ondansetron (ZOFRAN) injection 4 mg  4 mg Intravenous Q6H PRN Jared Muse President, DO        oxyCODONE-acetaminophen (PERCOCET) 5-325 MG per tablet 1 tablet  1 tablet Oral Q4H PRN Delaneyll Shells, DO   1 tablet at 06/28/18 0919    pantoprazole (PROTONIX) tablet 40 mg  40 mg Oral QAM AC Jared Muse President, DO   40 mg at 06/28/18 0644    polyethylene glycol (GLYCOLAX) packet 17 g  17 g Oral Daily Jared Muse President, DO   17 g at 06/28/18 0920    sennosides-docusate sodium (SENOKOT-S) 8.6-50 MG tablet 2 tablet  2 tablet Oral Daily PRN Delaneyll Shells, DO   2 tablet at 06/19/18 9302    therapeutic multivitamin-minerals 1 tablet  1 tablet Oral Daily Jared Muse President, DO   1 tablet at 06/28/18 0920    vitamin B and C (TOTAL B-C) 1 tablet  1 tablet Oral Daily Jared Muse President, DO   1 tablet at 06/28/18 0920    vitamin D tablet 2,000 Units  2,000 Units Oral Daily Jared Muse President, DO   2,000 Units at 06/28/18 0920    acetaminophen (TYLENOL) tablet 650 mg  650 mg Oral TID PRN Kayode Song MD        enoxaparin (LOVENOX) injection 40 mg  40 mg Subcutaneous Daily Emma Lemons MD   40 mg at 06/28/18 7504     Allergies   Allergen Reactions   

## 2018-06-28 NOTE — PROGRESS NOTES
OCCUPATIONAL THERAPY DAILY NOTE    Date:2018  Patient Name: Ellie Liu  MRN: [id number]  : 1945  Room: 17/5517-A     Patient Active Problem List   Diagnosis    S/P hip replacement    Weakness of both legs    Metastatic [facility] spine [facility])    Spinal cord neoplasm       Precautions: fall risk, TLSO & spinal precautions. Functional Assessment:   Date Status AE  Comments   Feeding 18 Mod I     Grooming 18 Mod I   seated   Bathing 18 Min A LH sponge Pt. Sponge bathed at bed level. UB Dressing 18 S/U- shirt  Max A- TLSO   instructed on donning TLSO at bed level. Pt. Donned sports bra and pullover shirt at S/U.    LB Dressing 18 Mod A  Reacher  Sock aid Pt. Donned depends and pants with AE with Mod A from  to complete dressing. Homemaking 18 S/U  mod assist- Kitchen  w/c level and reacher       Functional Transfers / Balance:   Date Status DME  Comments   Sit Balance 18 Min A     Stand Balance 18  na     [x] Tub  [] Shower   Transfer 18  Mod A x1 and SBA X1 Ext tub bench and transfer board. Commode   Transfer 18 Mod A x1 and SBA of 1 Drop arm commode and transfer board     Functional   Mobility 18 S   w/c level W/c mobility in OT clinic while retrieving bean bags from floor with reachers. Other: supine to sit on edge of bed    Bed rolling      Bed to w/c     W/c<>Mat 18 Mod/Max A      Min/mod assist      Mod A    Min/Mod A               Transfer board    Transfer board                         Functional Exercises / Activity:  Mr. Vaughn Allis with 2# wt to increase forearm and wrist strength. 10 reps  Rickshaw ex's to increase BUE strength to increase independence with transfers. 40# wt- 10 reps x3.      Sensory / Neuromuscular Re-Education:      Cognitive Skills:   Status Comments   Problem   Solving fair    Memory fair    Sequencing fair    Safety fair      Visual

## 2018-06-29 NOTE — PROGRESS NOTES
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     Current Facility-Administered Medications   Medication Dose Route Frequency Provider Last Rate Last Dose    sodium chloride flush 0.9 % injection 10 mL  10 mL Intravenous PRN Siri Contreras MD   10 mL at 06/25/18 1833    sodium chloride flush 0.9 % injection 10 mL  10 mL Intravenous BID Siri Contreras MD   10 mL at 06/26/18 0836    ipratropium-albuterol (DUONEB) nebulizer solution 1 ampule  1 ampule Inhalation 4x daily Siri Contreras MD   1 ampule at 06/29/18 0745    lactulose (CHRONULAC) 10 GM/15ML solution 10 g  10 g Oral Daily Kennedy Jones MD   10 g at 06/27/18 0831    fluticasone (FLONASE) 50 MCG/ACT nasal spray 1 spray  1 spray Each Nare Daily Siri Contreras MD   1 spray at 06/21/18 0842    ondansetron (ZOFRAN) injection 4 mg  4 mg Intravenous Q6H PRN Jared Monaea [place], DO        oxyCODONE-acetaminophen (PERCOCET) 5-325 MG per tablet 1 tablet  1 tablet Oral Q4H PRN Kristina Trimble, DO   1 tablet at 06/29/18 0630    pantoprazole (PROTONIX) tablet 40 mg  40 mg Oral QAM AC Jared Li [place], DO   40 mg at 06/29/18 0630    polyethylene glycol (GLYCOLAX) packet 17 g  17 g Oral Daily Jared Li [place], DO   17 g at 06/28/18 0920    sennosides-docusate sodium (SENOKOT-S) 8.6-50 MG tablet 2 tablet  2 tablet Oral Daily PRN Kristina Trimble, DO   2 tablet at 06/19/18 6778    therapeutic multivitamin-minerals 1 tablet  1 tablet Oral Daily Jared Li [place], DO   1 tablet at 06/28/18 0920    vitamin B and C (TOTAL B-C) 1 tablet  1 tablet Oral Daily Jared Li [place], DO   1 tablet at 06/28/18 0920    vitamin D tablet 2,000 Units  2,000 Units Oral Daily Jared Li [place], DO   2,000 Units at 06/28/18 0920    acetaminophen (TYLENOL) tablet 650 mg  650 mg Oral TID PRN Ruth Persaud MD        enoxaparin (LOVENOX) injection 40 mg  40 mg Subcutaneous Daily Emma Skinner MD   40 mg at 06/28/18 8419     Allergies   Allergen Reactions    Other Other (See Comments)     Seasonal allergies, cough and sinus drainage, sneeze     Active Problems:    Spinal cord neoplasm  Resolved Problems:    * No resolved hospital problems. *    Blood pressure 130/78, pulse 90, temperature 98 °F (36.7 °C), resp. rate 15, height 4' 11\" (1.499 m), weight 155 lb 3.2 oz (70.4 kg), SpO2 93 %, not currently breastfeeding. Subjective:  Symptoms:  Stable. She reports weakness. Diet:  Adequate intake. Activity level: Impaired due to weakness. Pain:  She complains of pain that is moderate. Objective:  General Appearance: In no acute distress. Vital signs: (most recent): Blood pressure 130/78, pulse 90, temperature 98 °F (36.7 °C), resp. rate 15, height 4' 11\" (1.499 m), weight 155 lb 3.2 oz (70.4 kg), SpO2 93 %, not currently breastfeeding. Vital signs are normal.    Output: Producing urine and producing stool. Lungs:  Normal effort and normal respiratory rate. Breath sounds clear to auscultation. Heart: Normal rate. Regular rhythm. S1 normal and S2 normal.    Abdomen: Abdomen is soft. Bowel sounds are normal.   There is no abdominal tenderness. Extremities: Normal range of motion. Neurological: Patient is alert. (3/5 lower). Assessment:      Date   Status   AE    Comments     Feeding   6/28/18   Mod I             Grooming   6/28/18   Mod I        seated     Bathing   6/28/18   Min A LH sponge   Pt. Sponge bathed at bed level. UB Dressing   6/28/18   S/U- shirt  Max A- TLSO      instructed on donning TLSO at bed level. Pt. Donned sports bra and pullover shirt at S/U.      LB Dressing   6/28/18   Mod A    Reacher    Sock aid Pt.  Donned depends and pants with AE with Mod A from  to complete dressing.         Homemaking   6/19/18   S/U    mod assist- Kitchen    w/c level and reacher              Functional Transfers / Balance:      Date Status DME  Comments   Sit Balance 6/28/18 Min A       Stand Balance 6/21/18  na

## 2018-06-29 NOTE — PROGRESS NOTES
Physical Therapy  Facility/Department: [address] REHAB  Daily Treatment Note  NAME: Shelly Rodríguez  : 1945  MRN: [id number]    Date of Service: 2018     Evaluating Therapist: Gwen Kinney P.T.     ROOM: [facility]  DIAGNOSIS: metastatic disease spine- s/p laminectomy T4-6 (2018)  PRECAUTIONS: Fall Risk, soft TLSO & spinal precautions, PRAFO in bed and when up     Social:  Pt lives with  in a 1 floor plan 2 steps and 0 rails. Prior to admission: Independent PTA       Initial Evaluation  18 AM     PM    Short Term Goals Long Term Goals    Was pt agreeable to Eval/treatment? 2018 Yes yes       Does pt have pain? 8/10 back pain c/o 4/10 back pain 4/10 back pain       Bed Mobility  Rolling: Min assist with bedrail  Supine to sit: dependent  Sit to supine: dependent  Scooting: dependent Rolling: cgA   Supine to sit: mod/maxA x2  Sit to supine: maxA x1  Scooting: modA Rolling: cgA  Supine to sit: maxA x2  Sit to supine: maxA x1  Scooting: modA Rolling: sba  Supine to sit: maxA  Sit to supine: maxA  Scooting: maxA Rolling: mod I  Supine to sit: modA  Sit to supine: modA  Scooting: modA   Transfers Sit to stand: Dependent  Sliding Board: Dependent  Stand to sit: Dependent  Stand pivot: Dependent Sit<>stand: NT  Stand pivot: NT  Slide board: modA x1 with b-easy board with 2 in board for feet to touch.  Sit<>stand: NT  Stand pivot: NT  Slide board: modA/maxA x1 with slide board and 2 in board (w/c to and from hospital bed) Slide board: Jarod Fat board:Pina   Ambulation   NT Patient unable NT    To be assessed as appropriate  To be assessed as appropriate   Walking 10 feet on uneven surface  NT patient unable NT    To be assessed as appropriate  To be assessed as appropriate   Wheel Chair Mobility Min assist with Bilat UE's X 10 feet 200' and 100' mod I with bilateral ' mod I with bilateral UE   Modified Independent with bilat UE's X feet level surfaces 500'   Car Transfers NT  NT

## 2018-06-30 NOTE — PROGRESS NOTES
Physical Therapy  Facility/Department: [address] [facility]  Daily Treatment Note  NAME: Hua Minor  : 1945  MRN: [id number]    Date of Service: 2018     Evaluating Therapist: Dorie Renee P.T.     ROOM: [address] [place] [address]  DIAGNOSIS: metastatic disease spine- s/p laminectomy T4-6 (2018)  PRECAUTIONS: Fall Risk, soft TLSO & spinal precautions, PRAFO in bed and when up     Social:  Pt lives with  in a 1 floor plan 2 steps and 0 rails. Prior to admission: Independent PTA       Initial Evaluation  18 AM     PM    Short Term Goals Long Term Goals    Was pt agreeable to Eval/treatment? 2018 Yes NA       Does pt have pain?  8/10 back pain c/o 10/10 back pain--RN notified, see comments        Bed Mobility  Rolling: Min assist with bedrail  Supine to sit: dependent  Sit to supine: dependent  Scooting: dependent Rolling: CGA   Supine to sit: max A   Sit to supine: max A  Scooting: mod A  Rolling: sba  Supine to sit: maxA  Sit to supine: maxA  Scooting: maxA Rolling: mod I  Supine to sit: modA  Sit to supine: modA  Scooting: modA   Transfers Sit to stand: Dependent  Sliding Board: Dependent  Stand to sit: Dependent  Stand pivot: Dependent Sit<>stand: NT  Stand pivot: NT  Slide board: modA x1 with slide board, max A x1 squat pivot   Slide board: modA Slide board:Pina   Ambulation   NT Patient unable NT   To be assessed as appropriate  To be assessed as appropriate   Walking 10 feet on uneven surface  NT patient unable NT   To be assessed as appropriate  To be assessed as appropriate   Wheel Chair Mobility Min assist with Bilat UE's X 10 feet 100' mod I with bilateral UE    Modified Independent with bilat UE's X feet level surfaces 500'   Car Transfers NT  NT   Max assist  Mod assist   Stair negotiation: ascended and descended                   NT NT    To be assessed as appropriate  To be assessed as appropriate   Curb Step:   ascended and descended NT NT   To be assessed To be assessed   Picking

## 2018-06-30 NOTE — PROGRESS NOTES
Zeferino Loo is a 81 y.o. female patient.     Current Facility-Administered Medications   Medication Dose Route Frequency Provider Last Rate Last Dose    sodium chloride flush 0.9 % injection 10 mL  10 mL Intravenous PRN Giovani Domingo MD   10 mL at 06/25/18 1833    sodium chloride flush 0.9 % injection 10 mL  10 mL Intravenous BID Giovani Domingo MD   10 mL at 06/26/18 0836    ipratropium-albuterol (DUONEB) nebulizer solution 1 ampule  1 ampule Inhalation 4x daily Giovani Domingo MD   1 ampule at 06/30/18 0843    lactulose (CHRONULAC) 10 GM/15ML solution 10 g  10 g Oral Daily Kennedy Jones MD   10 g at 06/30/18 0854    fluticasone (FLONASE) 50 MCG/ACT nasal spray 1 spray  1 spray Each Nare Daily Giovani Domingo MD   1 spray at 06/21/18 0842    ondansetron (ZOFRAN) injection 4 mg  4 mg Intravenous Q6H PRN Jared Elta Climes, DO        oxyCODONE-acetaminophen (PERCOCET) 5-325 MG per tablet 1 tablet  1 tablet Oral Q4H PRN Ludell Forester, DO   1 tablet at 06/30/18 0700    pantoprazole (PROTONIX) tablet 40 mg  40 mg Oral QAM AC Jared Elta Climes, DO   40 mg at 06/30/18 0654    polyethylene glycol (GLYCOLAX) packet 17 g  17 g Oral Daily Jared Elta Climes, DO   17 g at 06/28/18 0920    sennosides-docusate sodium (SENOKOT-S) 8.6-50 MG tablet 2 tablet  2 tablet Oral Daily PRN Ludell Forester, DO   2 tablet at 06/19/18 7586    therapeutic multivitamin-minerals 1 tablet  1 tablet Oral Daily Jared Elta Climes, DO   1 tablet at 06/30/18 0854    vitamin B and C (TOTAL B-C) 1 tablet  1 tablet Oral Daily Jared Elta Climes, DO   1 tablet at 06/30/18 0854    vitamin D tablet 2,000 Units  2,000 Units Oral Daily Jared Elta Climes, DO   2,000 Units at 06/30/18 0854    acetaminophen (TYLENOL) tablet 650 mg  650 mg Oral TID PRN Shiela Proper, MD        enoxaparin (LOVENOX) injection 40 mg  40 mg Subcutaneous Daily Emma Morley MD   40 mg at 06/30/18 6192     Allergies   Allergen Reactions   

## 2018-07-01 NOTE — PROGRESS NOTES
OCCUPATIONAL THERAPY DAILY NOTE    Date:2018  Patient Name: Alejandro Overton  MRN: [id number]  : 1945  Room: 5517/5517-A     Patient Active Problem List   Diagnosis    S/P hip replacement    Weakness of both legs    Metast[facility])    Spinal cord neoplasm       Precautions: fall risk, TLSO & spinal precautions. Functional Assessment:   Date Status AE  Comments   Feeding 18 Mod I     Grooming 18 Mod I      Bathing 18 Mod A LH sponge    UB Dressing 18 Min A    Max A- TLSO     LB Dressing 18 Mod A  Reacher  Sock aid    Homemaking 18   Min/mod assist w/c level and reacher Pt able to propel w/c in kitchen for item retrieval needing cues for proper w/c placement. Pt  utilized reacher to open/close cabinets and  items off shelves and off floor. Pt completed trunk control tasks reaching pantry items then placing on shelf to improve core strength vs using reacher. Functional Transfers / Balance:   Date Status DME  Comments   Sit Balance 18 Min A  Sitting balance performed during trunk control/core strengthening during hmkg skills. Stand Balance 18  na     [x] Tub        [x] Shower   Transfer 18  Mod A x1 and SBA X1      Dependent  (Max x2) Ext tub bench and transfer board. Rolling drop arm shower chair            Commode   Transfer 18 Mod A x1 and SBA of 1 Drop arm commode and transfer board     Functional   Mobility 18 S   w/c level    Other: supine to sit on edge of bed    Bed rolling      Bed to w/c     W/c<>Mat    Bed<>Shower Chair 18 Mod/Max A        Min A      Mod A    Min/Mod A    Mod A               Transfer board    Transfer board    Transfer board               18- francis lift utilized since disc transfer board not present in room.                        Functional Exercises / Activity:  BUE AROM ex's using 2# dumbbell to improve strength/endurance for ADL's and transfers. Pt tolerated 2 reps of 10 ea for shld punches, bicep curls, presses, abduction/adduction performed up in w/c. Sensory / Neuromuscular Re-Education:      Cognitive Skills:   Status Comments   Problem   Solving fair    Memory fair    Sequencing fair    Safety fair      Visual Perception:    Education:  Pt educated with w/c level simple hmkg tasks to increase skills along with trunk control/core strengthening for transfers. [x] Family teach completed on: 6/13/18 Pt with son and  present for therapy session. Educated with regards to donning TLSO supine in bed,  LE dressing techniques supine in bed for EC/WS techniques, clothing management with regards to toileting when using francis to transfer to commode. 6/14/18 with son in law and  of patient. Family observed commode transfer with transfer board. Discussed ADL levels and safety and DME for home. 6/21/18- Son performed commode transfer with patient today with SBA of therapist.   6/27/18-  completed tub transfer with patient with SBA and v/c's from therapist.   Pain Level: 6/10  back     Additional Notes:       Patient has made fair   progress during treatment sessions toward set goals. Therapy emphasis to obtain goals:  Balance, transfers, ADL's, strength/endurance. Functional mobility, wheel chair mobility, positioning, safety education and home mgmt. [x] Continue with current OT Plan of care.   [] Prepare for Discharge     DISCHARGE RECOMMENDATIONS  Recommended DME:  Drop arm 3 in 1, ext tub bench and AE    Post Discharge Care:   []Home Independently  [x]Home with 24hr Care / Supervision []Home with Partial Supervision []Home with Home Health OT []Home with Out Pt OT []Other: ___   Comments:         Time in Time out Tx Time Breakdown  Variance:   First Session  12:00 pm 12:45 pm [x] Individual Tx- 45mins  [] Concurrent Tx-  min   [] Co-Tx -   [] Group Tx -   [] Time Missed -

## 2018-07-02 NOTE — PROGRESS NOTES
Nutrition Assessment    Type and Reason for Visit: Reassess    Nutrition Recommendations: Continue current diet and ONS as ordered. No new recommendations at this time. Will continue to monitor patient. Malnutrition Assessment:  · Malnutrition Status: At risk for malnutrition  · Context: Chronic illness  · Findings of the 6 clinical characteristics of malnutrition (Minimum of 2 out of 6 clinical characteristics is required to make the diagnosis of moderate or severe Protein Calorie Malnutrition based on AND/ASPEN Guidelines):  1. Energy Intake-Less than or equal to 75%, greater than 7 days (since admit to acute rehab unit)    2. Weight Loss-7.5% loss or greater, in 1 month  3. Fat Loss-No significant subcutaneous fat loss,    4. Muscle Loss-No significant muscle mass loss,    5. Fluid Accumulation-No significant fluid accumulation,    6.   Strength-Not measured    Nutrition Diagnosis:   · Problem: Inadequate oral intake  · Etiology: related to Insufficient energy/nutrient consumption     Signs and symptoms:  as evidenced by Diet history of poor intake, Intake 50-75%, Weight loss greater than or equal to 5% in 1 month    Nutrition Assessment:  · Nutrition-Focused Physical Findings: -I/os, soft abd, active bs, paraplegia,. hx of resolved post op ileus, +1 BLE edema noted   · Wound Type: Surgical Wound (to back)  · Current Nutrition Therapies:  · Oral Diet Orders: General   · Oral Diet intake: 51-75%  · Oral Nutrition Supplement (ONS) Orders: Frozen Oral Supplement  · ONS intake: 26-50%  · Anthropometric Measures:  · Ht: 4' 11\" (149.9 cm)   · Current Body Wt: 155 lb (70.3 kg) (6/27 actual bedscale, UTO updated wt 2/2 pt oob during visit )  · Admission Body Wt: 163 lb (73.9 kg) (6/8 actual per EMR)  · Usual Body Wt: 172 lb (78 kg) (5/2017  actual per EMR)  · % Weight Change: noted 17lb 9.8% wt loss x1 month per EMR history,     · Ideal Body Wt: 98 lb (44.5 kg), % Ideal Body 158%  · Adjusted Body Wt: 112 lb

## 2018-07-02 NOTE — PROGRESS NOTES
OCCUPATIONAL THERAPY DAILY NOTE    Date:2018  Patient Name: Ulysses Luna  MRN: [id number]  : 1945  Room: [facility]     Patient Active Problem List   Diagnosis    S/P hip replacement    Weakness of both legs    Metast[facility] spine [facility])    Spinal cord neoplasm       Precautions: fall risk, TLSO & spinal precautions. Functional Assessment:   Date Status AE  Comments   Feeding 18 Mod I     Grooming 18 Mod I   seated   Bathing 18 Mod A LH sponge    UB Dressing 18 Min A    Max A- TLSO      Max A to doff/don TLSO. TLSO adjusted by therapist for increased comfort. LB Dressing 18 Mod A  Reacher  Sock aid    Homemaking 18 SBA w/c level  Pt completed clothes folding while seated in w/c     Functional Transfers / Balance:   Date Status DME  Comments   Sit Balance 18 Min A     Stand Balance 18  na     [x] Tub        [x] Shower   Transfer 18  Mod A x1 and SBA X1      Dependent  (Max x2) Ext tub bench and transfer board. Rolling drop arm shower chair            Commode   Transfer 18 Mod A x1 and SBA of 1 Drop arm commode and transfer board     Functional   Mobility 18 SBA   w/c level Around therapy room, verbal cuing required to widen turn when going around obstacles. Other: supine to sit on edge of bed    Bed rolling      Bed to w/c     W/c<>Mat    Bed<>Shower Chair 18 Mod/Max A        Min A      Mod A    Min/Mod A    Mod A               Transfer board    Transfer board    Transfer board                                      Functional Exercises / Activity:  BUE AROM ex's using 2# dumbbell to improve strength/endurance for ADL's and transfers. Pt tolerated 2 reps of 10 ea for shld punches, bicep curls, presses, abduction/adduction performed up in w/c. Hand strengthening exercises completed 3 x 20 for increased strength for increased independence with ADL's.     Pt

## 2018-07-02 NOTE — CARE COORDINATION
Pt will be transported home via wheelchair by [employer], [phone] at [address] [place] [address].  Family is aware that this is a private pay transport and will be approximately $70.00.     John Bhagat  7/2/2018

## 2018-07-02 NOTE — PROGRESS NOTES
discharge: Activity tolerance, poor strength in bilateral LE, poor sitting balance (static and dynamic)  Additional comments:  Pt very motivated however limited by above barriers. Will emphasize ROM/weight bearing to bilateral LE with ALLY lift as well as slide board transfers pending trunk/sitting balance improvement and progress as appropriate. DME:  TBA  After Care:  TBA    6/12/18: Please try B-easy board with transfers. Patient will require ramp at discharge, please determine if portable ramp is feasible. FT to be scheduled daily with  due to burden of care.   Chichi De, PT, DPT [id number]    Hermes Robertson DPT  [id number]

## 2018-07-02 NOTE — PROGRESS NOTES
Asia Easton is a 81 y.o. female patient.     Current Facility-Administered Medications   Medication Dose Route Frequency Provider Last Rate Last Dose    sodium chloride flush 0.9 % injection 10 mL  10 mL Intravenous PRN Romelia Reece MD   10 mL at 06/25/18 1833    sodium chloride flush 0.9 % injection 10 mL  10 mL Intravenous BID Romelia Reece MD   10 mL at 06/26/18 0836    ipratropium-albuterol (DUONEB) nebulizer solution 1 ampule  1 ampule Inhalation 4x daily Romelia Reece MD   1 ampule at 07/02/18 0725    lactulose (CHRONULAC) 10 GM/15ML solution 10 g  10 g Oral Daily Kennedy Jones MD   10 g at 07/01/18 0921    fluticasone (FLONASE) 50 MCG/ACT nasal spray 1 spray  1 spray Each Nare Daily Romelia Reece MD   1 spray at 06/21/18 0842    ondansetron (ZOFRAN) injection 4 mg  4 mg Intravenous Q6H PRN Jared Lyon Luster, DO        oxyCODONE-acetaminophen (PERCOCET) 5-325 MG per tablet 1 tablet  1 tablet Oral Q4H PRN Waldo Franklinn, DO   1 tablet at 07/02/18 0324    pantoprazole (PROTONIX) tablet 40 mg  40 mg Oral QAM AC Jared Lyon Luster, DO   40 mg at 07/02/18 0528    polyethylene glycol (GLYCOLAX) packet 17 g  17 g Oral Daily Jared Lyon Luer, DO   17 g at 06/28/18 0920    sennosides-docusate sodium (SENOKOT-S) 8.6-50 MG tablet 2 tablet  2 tablet Oral Daily PRN Waldo Dallas, DO   2 tablet at 06/19/18 4661    therapeutic multivitamin-minerals 1 tablet  1 tablet Oral Daily Jared Lyon Luster, DO   1 tablet at 07/01/18 0613    vitamin B and C (TOTAL B-C) 1 tablet  1 tablet Oral Daily Jared Lyon Luster, DO   1 tablet at 07/01/18 2609    vitamin D tablet 2,000 Units  2,000 Units Oral Daily Jared Lyon Luster, DO   2,000 Units at 07/01/18 2891    acetaminophen (TYLENOL) tablet 650 mg  650 mg Oral TID PRN Patriciaann Landsberg, MD        enoxaparin (LOVENOX) injection 40 mg  40 mg Subcutaneous Daily Emma Goldman MD   40 mg at 07/01/18 4037     Allergies   Allergen Reactions    Other Other (See Comments)     Seasonal allergies, cough and sinus drainage, sneeze     Active Problems:    Spinal cord neoplasm  Resolved Problems:    * No resolved hospital problems. *    Blood pressure 139/61, pulse 87, temperature 98.4 °F (36.9 °C), resp. rate 16, height 4' 11\" (1.499 m), weight 155 lb 3.2 oz (70.4 kg), SpO2 94 %, not currently breastfeeding. Subjective:  Symptoms:  Stable. She reports weakness. Diet:  Adequate intake. Activity level: Impaired due to weakness. Pain:  She complains of pain that is moderate. Objective:  General Appearance: In no acute distress. Vital signs: (most recent): Blood pressure 139/61, pulse 87, temperature 98.4 °F (36.9 °C), resp. rate 16, height 4' 11\" (1.499 m), weight 155 lb 3.2 oz (70.4 kg), SpO2 94 %, not currently breastfeeding. Vital signs are normal.    Output: Producing urine and producing stool. Lungs:  Normal effort and normal respiratory rate. Breath sounds clear to auscultation. Heart: Normal rate. Regular rhythm. S1 normal and S2 normal.    Abdomen: Abdomen is soft. Bowel sounds are normal.   There is no abdominal tenderness. Extremities: Normal range of motion. Neurological: Patient is alert. (3/5 lowers). Assessment:      Date   Status   AE    Comments     Feeding   6/28/18   Mod I             Grooming   6/29/18   Mod I              Bathing   6/29/18   Mod A LH sponge         UB Dressing   6/29/18   Min A     Max A- TLSO           LB Dressing   6/28/18   Mod A    Reacher    Sock aid     Homemaking   7/1/18        Min/mod assist   w/c level and reacher   Pt able to propel w/c in kitchen for item retrieval needing cues for proper w/c placement. Pt  utilized reacher to open/close cabinets and  items off shelves and off floor.   Pt completed trunk control tasks reaching pantry items then placing on shelf to improve core strength vs using reacher.            Functional Transfers / Balance:      Date Status DME

## 2018-07-02 NOTE — PROGRESS NOTES
feet level surfaces 500'   Car Transfers NT  NT   Max assist  Mod assist   Stair negotiation: ascended and descended                   NT NA    To be assessed as appropriate  To be assessed as appropriate   Curb Step:   ascended and descended NT NA   To be assessed To be assessed   Picking up object off the floor NT NA   To be assessed To be assessedt   BLE ROM PROM WNL's all aspects PROM WNL's all aspects  PROM WNL's all aspects       BLE Strength Trace Right LE PF/DF, Knee Ext, Hip Ext. Left LE 0/5 Left LE: hip 0/5, knee 2-/5, ankle 2-/5  Right LE: hip 1/5, knee 3-/5, ankle 3-/5  Increase Right LE strength to P-/P  Increase Left LE Strength to trace/P- Increase Right LE strength to P+/F-  Increase Left LE strength to P/P+   Balance  Seated static balance F+ with UE support Sitting: static: sba/sup  Dynamic: cgA/sba at EOB  Standing: max x2 with standard walker Static Sitting: sba/sup  Dynamic Sitting: cgA at EOB  Standing: NT       Date Family Teach Completed NA   FT with family:  6/21, 6/22, 6/25, 6/26, 6/27, 6/28, 6/29, 7/2        Is additional Family Teaching Needed?   Y or N yes Y (son and DIL to continue to attend) Y (son and DIL to continue to attend)       Hindering Progress LE weakness LE weakness, sitting balance, activity tolerance LE weakness, sitting balance, activity tolerance       PT recommended ELOS 6 weeks          Team's Discharge Plan             Therapist at Team Meeting             Therapeutic Exercise:   AM: sitting balance activity at EOB (static unsupported sitting with weight shift outside of romina laterally, ap with patient to complete with ability to return to midline, no LOB noted with increased mobility to left side)   Dynamic sitting EOB: weight shifting onto elbow (for slide baord placement, pressure relief, scooting contralateral pelvis back with no LOB noted, increased control down onto left elbow noted (eccentric right trunk)  W/c push up 3x5 min/modA to reach partial stand and deweight ischial tub from w/c     PM: supine gs, qs, ap, hip add, saq, hip ext/abd x20 each (aarom) to increase strength for transfers. Patient education  Pt and family educated on weight shifting in sitting for scooting, w/c component management for slide board/b-easy board transfer    Patient response to education:   Pt verbalized understanding Pt demonstrated skill Pt requires further education in this area   yes Yes  Reinforcement      Additional Comments: Son uche and  bob present for session. Curtis Padron performed slide board and b-easy board transfer this date from w/c to and from mat table, utilizing bed controls as wells managing components of w/c to complete slide board transfer safely. Son able to provide proper verbal and tactile cues for weight shifting for board placement, completing transfer with patient assisting and managing her foot placement throughout transfer safely, as well as scooting patient back safely with contralateral lean onto elbow and moving opposite leg backward on mat. Patient reporting reduced pain from over weekend. Trial sts transfer at standard walker with PT and second PT blocking knees, reaching full standing with maxA x2 and tolerating standing at mod/maxA level for 20 seconds. In PM, dtr present for FT with slide board. Dtr returned demonstration with occasional verbal cueing for placement and sequencing. Reviewed bed mobility from mat table with maxA x2. Pt at this time would benefit from hospital bed to utilize elevating aspect of HOB for supine to sit transfers, as well as utilizing side rails and bed controls for frequent repositioning to reduce risk of skin breakdown. Skin was inspected: prafo removed and positioned back on properly in sitting, no breakdown noted bilateral distal LE  Chair/bed alarm: n/a    AM  Time in: 1045  Time out: 1130    PM  Time in: 1515  Time out: 1600    Pt is making fair progress toward established Physical Therapy goals.   Continue with physical therapy current plan of care.     John Owens, MOISES  [id number]

## 2018-07-03 NOTE — PATIENT CARE CONFERENCE
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, decompressive thoracic laminectomy T3-4, T4-5, T5-6  Impairment Group Code:  4.130      MEDICAL/FUNCTIONAL HISTORY/STATUS:  pain is still an issue, family has done well with ongoing education    Consultations/Labs/X-rays: none recent      MEDICATION UPDATE:  no recent changes      NURSING FIMS:    Bowel:   Current level: Modified independent  Short term bowel goal:  Modified independent  Long term bowel goal: Modified independent    Bladder:   Current level: dependent, salamanca-will go home with salamanca  Short term bladder goal: dependent  Long term bladder goal: dependent    Toilet Hygiene:   Current level : dependent    Skin integrity: intact  Pain: back pain , relieved with percocet    NUTRITION    Diet  general  Liquid consistency   thin    SOCIAL INFORMATION:  Lives with: spouse, Christin Palacio  Prior community services:  None noted  Home Architecture:  Ranch style with 2 entry steps no rails  Prior Level of function:  independent  DME:  Wheeled walker, bedside commode    FAMILY / PATIENT EDUCATION:  ongoing with patient and family    PHYSICAL THERAPY    Bed mobility:   Current level: Contact guard assist for rolling, supine to sit is max assist of 2, sit to supine is max assist of 1, scooting is max assist  Short term bed mobility goal: max assist  Long term bed mobility goal: mod assist    Chair/bed transfers:  Current level: sit stands are max assist of 2, transfer board is mod assist of 1 with transfer board  Short term Chair/bed transfers goal: mod assist  Long term Chair/bed transfers goal: min assist    Wheelchair Mobility:  Current level: 150 -200 ft Modified independent using uppers  Short term wheelchair goal: met  Long term wheelchair goal: 500 ft.  Modified independent      Car transfers:   Current level: NA, will go home via ambulette      Lower Extremity Strength Issues:   Left LE: hip 0/5, knee 2-/5, ankle 2-/5  Right LE: hip 1/5, knee 3-/5, ankle 3-/5    Other comments: stand balance is max assist of 2 using a standard walker      OCCUPATIONAL THERAPY:      Tub/shower:   Current level: mod assist-dependent with bench and transfer board  Short term tub/shower goal: mod assist  Long term tub/shower goal: mod assist      Feeding:  Current level: Modified independent  Short term feeding goal: Modified independent  Long term feeding goal: Modified independent    Grooming:   Current level: Modified independent  Short term grooming goal: Modified independent  Long term grooming goal: Modified independent    Bathing:  Current level: mod assist with adaptive equipment  Short term bathing goal: min assist  Long term bathing goal: min assist    Homemaking:   Current level: mod assist-min assist wc level  Short term homemaking goal: min assist  Long term homemaking goal: min assist    Upper body dressing:  Current level: max assist due to TLSO, family able to do  Short term upper body dressing goal: mod assist  Long term upper body dressing goal: mod assist    Lower body dressing:  Current level: mod assist with adaptive equipment  Short term lower body dressing goal: mod assist  Long term lower body dressing goal: met    Toilet transfer:   Current level: mod assist with drop arm and slide board  Short term toilet transfer goal: mod assist  Long term toilet transfer goal: mod assist    Social interaction:  Modified independent    Safety awareness: fair      Patient/family's personal goals: per family, take her home  Factors supporting goal achievement:  supportive family  Factors hindering goal achievement:  burden of care      Discharge Plan   Estimated Length of Stay: 7/5            Destination: home  Services

## 2018-07-03 NOTE — PROGRESS NOTES
OCCUPATIONAL THERAPY DAILY NOTE    Date:7/3/2018  Patient Name: Jasmine Sutherland  MRN: [id number]  : 1945  Room: 17/5517-A     Patient Active Problem List   Diagnosis    S/P hip replacement    Weakness of both legs    Metastatic cancer to spine [facility])    Spinal cord neoplasm       Precautions: fall risk, TLSO & spinal precautions. Functional Assessment:   Date Status AE  Comments   Feeding 18 Mod I     Grooming 18 Mod I      Bathing 18 Mod A LH sponge    UB Dressing 18 Min A    Max A- TLSO     LB Dressing 18 Mod A  Reacher  Sock aid    Homemaking 7/3/18 Mod I w/c level Pt. Gathered items, prepared buttered toast and cleaned up after task. Functional Transfers / Balance:   Date Status DME  Comments   Sit Balance 7/3/18 Min A     Stand Balance 18  na     [x] Tub        [x] Shower   Transfer 18  Mod A x1 and SBA X1      Dependent  (Max x2) Ext tub bench and transfer board. Rolling drop arm shower chair            Commode   Transfer 7/3/18 Min A Drop arm commode disc transfer board  Son completed transfer with Only Min A needed for balance and positioning of BLE's when needed. Functional   Mobility 7/3/18 SBA   w/c level Around kitchen area and to and from therapy. Other: supine to sit on edge of bed    Bed rolling      Bed to w/c     W/c<>Mat    Bed<>Shower Chair 18 Mod/Max A        Min A      Mod A    Min/Mod A    Mod A               Transfer board    Transfer board    Transfer board                                      Functional Exercises / Activity:  Mr. Deepali Messina with 2# wt to increase forearm and wrist strength of BUE's. 5 reps x2. Yellow bernardo bar to increase forearm and  strength of BUE's. 20 reps x4 planes. Blue theraputty ax with wheel to increase AROM, strength and FM strength of BUE's. Power hand gripper 35# to increase B hand strength. 35 reps.      Sensory / Neuromuscular Re-Education:      Cognitive Skills:   Status Comments   Problem   Solving fair    Memory fair    Sequencing fair    Safety fair      Visual Perception:    Education:  Pt/family educated on safety with commode transfers. [x] Family teach completed on: 6/13/18 Pt with son and  present for therapy session. Educated with regards to donning TLSO supine in bed,  LE dressing techniques supine in bed for EC/WS techniques, clothing management with regards to toileting when using francis to transfer to commode. 6/14/18 with son in law and  of patient. Family observed commode transfer with transfer board. Discussed ADL levels and safety and DME for home. 6/21/18- Son performed commode transfer with patient today with SBA of therapist.   6/27/18-  completed tub transfer with patient with SBA and v/c's from therapist.   7/3/18- Son performed commode transfer with patient. Pain Level: 7/10  back     Additional Notes:       Patient has made fair   progress during treatment sessions toward set goals. Therapy emphasis to obtain goals:  Balance, transfers, ADL's, strength/endurance. Functional mobility, wheel chair mobility, positioning, safety education and home mgmt. [x] Continue with current OT Plan of care.   [] Prepare for Discharge     DISCHARGE RECOMMENDATIONS  Recommended DME:  Drop arm 3 in 1, ext tub bench and AE    Post Discharge Care:   []Home Independently  [x]Home with 24hr Care / Supervision []Home with Partial Supervision []Home with Home Health OT []Home with Out Pt OT []Other: ___   Comments:         Time in Time out Tx Time Breakdown  Variance:   First Session  [phone] [] Individual Tx-   [x] Concurrent Tx-  45 min   [] Co-Tx -   [] Group Tx -   [] Time Missed -          Second Session [phone] [] Individual Tx-    [x] Concurrent Tx- 45 mins  [] Co-Tx -   [] Group Tx -   [] Time Missed -     Third Session    [] Individual Tx-   [] Concurrent Tx -  [] Co-Tx -   [] Group Tx -   [] Time Missed -         Total Tx Time 90 mins   Ruma Morales ALCANTARA/L [id number]    I have read & agree with the above status.     India Andrew OTR/L [id number]

## 2018-07-03 NOTE — PROGRESS NOTES
Ellie Liu is a 81 y.o. female patient.     Current Facility-Administered Medications   Medication Dose Route Frequency Provider Last Rate Last Dose    sodium chloride flush 0.9 % injection 10 mL  10 mL Intravenous PRN Simba Ahmadi MD   10 mL at 06/25/18 1833    sodium chloride flush 0.9 % injection 10 mL  10 mL Intravenous BID Simba Ahmadi MD   10 mL at 06/26/18 0836    ipratropium-albuterol (DUONEB) nebulizer solution 1 ampule  1 ampule Inhalation 4x daily Simba Ahmadi MD   1 ampule at 07/02/18 2133    lactulose (CHRONULAC) 10 GM/15ML solution 10 g  10 g Oral Daily Kennedy Jones MD   10 g at 07/02/18 0855    fluticasone (FLONASE) 50 MCG/ACT nasal spray 1 spray  1 spray Each Nare Daily Simba Ahmadi MD   1 spray at 06/21/18 0842    ondansetron (ZOFRAN) injection 4 mg  4 mg Intravenous Q6H PRN Jared Alberts, DO        oxyCODONE-acetaminophen (PERCOCET) 5-325 MG per tablet 1 tablet  1 tablet Oral Q4H PRN Mobile Orf, DO   1 tablet at 07/03/18 0452    pantoprazole (PROTONIX) tablet 40 mg  40 mg Oral QAM AC Jared Harkins Combe, DO   40 mg at 07/03/18 0452    polyethylene glycol (GLYCOLAX) packet 17 g  17 g Oral Daily Jared Alberts, DO   17 g at 06/28/18 0920    sennosides-docusate sodium (SENOKOT-S) 8.6-50 MG tablet 2 tablet  2 tablet Oral Daily PRN Mobile Orf, DO   2 tablet at 06/19/18 9132    therapeutic multivitamin-minerals 1 tablet  1 tablet Oral Daily Jared Harkins Combe, DO   1 tablet at 07/02/18 0855    vitamin B and C (TOTAL B-C) 1 tablet  1 tablet Oral Daily Jared Harkins Combe, DO   1 tablet at 07/02/18 0856    vitamin D tablet 2,000 Units  2,000 Units Oral Daily Jared Alberts, DO   2,000 Units at 07/02/18 0855    acetaminophen (TYLENOL) tablet 650 mg  650 mg Oral TID PRN Marianne Srinivasan MD        enoxaparin (LOVENOX) injection 40 mg  40 mg Subcutaneous Daily Emma Robertson MD   40 mg at 07/02/18 0809     Allergies   Allergen Reactions   

## 2018-07-03 NOTE — PROGRESS NOTES
Physical Therapy  Facility/Department: [address] REHAB  Daily Treatment Note  NAME: Zeferino Loo  : 1945  MRN: [id number]    Date of Service: 7/3/2018     Evaluating Therapist: Francisco Nelson P.T.     ROOM: [address] [place] [address]  DIAGNOSIS: metastatic disease spine- s/p laminectomy T4-6 (2018)  PRECAUTIONS: Fall Risk, soft TLSO & spinal precautions, PRAFO in bed and when up     Social:  Pt lives with  in a 1 floor plan 2 steps and 0 rails. Prior to admission: Independent PTA       Initial Evaluation  18 AM     PM    Short Term Goals Long Term Goals    Was pt agreeable to Eval/treatment? 2018 Yes yes       Does pt have pain? 8/10 back pain c/o 4/10 back pain 4/10 back pain       Bed Mobility  Rolling: Min assist with bedrail  Supine to sit: dependent  Sit to supine: dependent  Scooting: dependent Rolling: cgA   Supine to sit: maxA x2  Sit to supine: maxA x1  Scooting: modA Rolling: cgA  Supine to sit: maxA x2  Sit to supine: maxA x1  Scooting: modA Rolling: sba  Supine to sit: maxA  Sit to supine: maxA  Scooting: maxA Rolling: mod I  Supine to sit: modA  Sit to supine: modA  Scooting: modA   Transfers Sit to stand: Dependent  Sliding Board: Dependent  Stand to sit: Dependent  Stand pivot: Dependent Sit<>stand: NT  Stand pivot: NT  Slide board: modA x1 with b-easy board and slide board with 2 in board for feet to touch.  Sit<>stand: NT  Stand pivot: NT  Slide board: modA x1 with slide board and 2 in board (w/c to and from mat table) Slide board: Blanca Valero board:Pina   Ambulation   NT Patient unable NA    To be assessed as appropriate  To be assessed as appropriate   Walking 10 feet on uneven surface  NT patient unable NA    To be assessed as appropriate  To be assessed as appropriate   Wheel Chair Mobility Min assist with Bilat UE's X 10 feet 150' with bilateral UE mod I 150' mod I with bilateral UE   Modified Independent with bilat UE's X feet level surfaces 500'   Car Transfers NT  NT   Max assist  Mod assist   Stair negotiation: ascended and descended                   NT NA    To be assessed as appropriate  To be assessed as appropriate   Curb Step:   ascended and descended NT NA   To be assessed To be assessed   Picking up object off the floor NT NA   To be assessed To be assessedt   BLE ROM PROM WNL's all aspects PROM WNL's all aspects  PROM WNL's all aspects       BLE Strength Trace Right LE PF/DF, Knee Ext, Hip Ext. Left LE 0/5  Left LE: hip 0/5, knee 2-/5, ankle 2-/5  Right LE: hip 1/5, knee 3-/5, ankle 3-/5 Increase Right LE strength to P-/P  Increase Left LE Strength to trace/P- Increase Right LE strength to P+/F-  Increase Left LE strength to P/P+   Balance  Seated static balance F+ with UE support Sitting: static: sup  Dynamic: sba at EOB  Standing: NT Static Sitting: sup  Dynamic Sitting: sba at EOB  Standing: NT       Date Family Teach Completed NA   FT with family:  6/21, 6/22, 6/25, 6/26, 6/27, 6/28, 6/29, 7/2, 7/3        Is additional Family Teaching Needed? Y or N yes Y (son and DIL to continue to attend) Y (son and DIL to continue to attend)       Hindering Progress LE weakness LE weakness, sitting balance, activity tolerance LE weakness, sitting balance, activity tolerance       PT recommended ELOS 6 weeks          Team's Discharge Plan             Therapist at Team Meeting             Therapeutic Exercise:   AM: w/c push up 2 sets of 5 Pina to reach partial standing on each rep. PM: w/c obstacle course with 1 episode of bumping wall with pt correcting    Patient education  Pt and son/ educated on slide board transfer, w/c components management, bed mobility, TLSO donning and doffing    Patient response to education:   Pt verbalized understanding Pt demonstrated skill Pt requires further education in this area   yes yes Reinforcement      Additional Comments: Son and  present.  Son returned demonstration of w/c management and slide board transfer with no verbal or tactile input with slide board transfer w/c to and from mat. Son and  returned demonstration with log roll into and out of bed, as well as donning and doffing TLSO in supine. Son reporting feeling comfortable with slide board and b-easy board transfer w/c to and from mat table/hospital bed and utilizing bed controls/hospital bed for positioning (as well as allowing patient to frequently reposition in supine using bed rails and bed controls). In PM,  and son reviewed TLSO placement in supine using hospital bed to assist with rolling and pressure relief, as well as log roll. Son and  demonstrating slide board hospital bed to and from w/c with verbal cues to weight shift as zeyad was caught on the brief. With cueing, able to get board under and son completed transfer safely. Will continue with FT as appropriate. Skin was inspected: heels and distal LE intact, PRAFOs donned back on after skin inspection     AM  Time in: 1045  Time out: 1130    PM  Time in: 1515  Time out: 1600    Pt is making fair progress toward established Physical Therapy goals. Continue with physical therapy current plan of care.     Avery Lyon, ADAMAT  [id number]

## 2018-07-04 NOTE — PROGRESS NOTES
dtr in law. Per son, ramp may not be completed by tomorrow from contractor not ordering correct part. Educated son on w/c bump up step, tipping backward slightly in chair with at least assist x2 to complete with son reporting PATY, himself and DIL will be present if ramp would not be ready by tomorrow morning, and verbalizing understanding of technique as well as feeling comfortable completing safely. Plan to d/c home at this time tomorrow with w/c [facility] for transport at this time. AM  Time in: 1030  Time out: 1130        Pt is making fair progress toward established Physical Therapy goals. Continue with physical therapy current plan of care.     Parth Damon DPT  [id number]

## 2018-07-04 NOTE — PROGRESS NOTES
DISCHARGE SUMMARY    Group interaction skills/socialization:  Edison Moya displayed social interaction skills at the [facility]. Leisure participation/awareness:  Edison Moya participated in 4 therapeutic recreation interventions identifying 5 benefits to leisure participation.     Other:     Outcomes: goals achieved      Electronically signed by Samara Minaya on 7/4/2018 at 12:59 PM

## 2018-07-05 NOTE — PROGRESS NOTES
in bed,  LE dressing techniques supine in bed for EC/WS techniques, clothing management with regards to toileting when using francis to transfer to commode. 6/14/18 with son in law and  of patient. Family observed commode transfer with transfer board. Discussed ADL levels and safety and DME for home. 6/21/18- Son performed commode transfer with patient today with SBA of therapist.   6/27/18-  completed tub transfer with patient with SBA and v/c's from therapist.   7/3/18- Son performed commode transfer with patient. 7/5/18- performed commode transfer with patient. Pain Level: 7/10  back     Additional Notes:       Patient has made fair   progress during treatment sessions toward set goals. Therapy emphasis to obtain goals:  Balance, transfers, ADL's, strength/endurance. Functional mobility, wheel chair mobility, positioning, safety education and home mgmt. [] Continue with current OT Plan of care.   [x] Prepare for Discharge     DISCHARGE RECOMMENDATIONS       Long term goals  Time Frame for Long term goals : 6 weeks  Long term goal 1: Pt demo Mod I to eat all meals  Long term goal 2: Pt demo Mod I grooming seated  Long term goal 3: Pt demo Min A bathing seated in shower or @ bed level  Long term goal 4: Pt demo Mod A UE &LE dress with AE as needed  Long term goal 5: Pt demo Mod  A drop arm commode & slide board   Long term goals 6: Pt demo Mod A walk inshower trf using a slide board  Long term goal 7: Pt demo Min A light meal prep @ wc level & demoG safety  Long term goal 8: Pt demo G- endurance for a 30 minute funcitonal activity  Long term goal 9: Pt demo G static & G- static sitting balance seated EOB  Long term goal 10: Pt demo Mod I wc proprulsion thorughout a living environment & around obstacles      Recommended DME:  Drop arm 3 in 1, ext tub bench and AE    Post Discharge Care:   []Home Independently  [x]Home with 24hr Care / Supervision []Home with Partial Supervision []Home

## 2018-07-05 NOTE — PROGRESS NOTES
Physical Therapy  Facility/Department: [address] REHAB  Discharge Summary  NAME: Zeferino Loo  : 1945  MRN: [id number]    Date of Service: 2018     Evaluating Therapist: Francisco Nelson P.T.     ROOM: [address] [place] [address]  DIAGNOSIS: metastatic disease spine- s/p laminectomy T4-6 (2018)  PRECAUTIONS: Fall Risk, soft TLSO & spinal precautions, PRAFO in bed and when up     Social:  Pt lives with  in a 1 floor plan 2 steps and 0 rails. Prior to admission: Independent PTA       Initial Evaluation  18 Discharge     Short Term Goals Long Term Goals    Was pt agreeable to Eval/treatment? 2018 Yes       Does pt have pain?  8/10 back pain c/o mod back pain       Bed Mobility  Rolling: Min assist with bedrail  Supine to sit: dependent  Sit to supine: dependent  Scooting: dependent Rolling: cgA/sba (bed rail)  Supine to sit: maxA   Sit to supine: maxA   Scooting: modA Rolling: sba  Supine to sit: maxA  Sit to supine: maxA  Scooting: maxA Rolling: mod I  Supine to sit: modA  Sit to supine: modA  Scooting: modA   Transfers Sit to stand: Dependent  Sliding Board: Dependent  Stand to sit: Dependent  Stand pivot: Dependent   Slide board: modA x1 with slide board with 2 in board for feet to touch Slide board: Blanca Valero board:iPna   Ambulation   NT Patient unable NA  To be assessed as appropriate  To be assessed as appropriate   Walking 10 feet on uneven surface  NT patient unable NA  To be assessed as appropriate  To be assessed as appropriate   Wheel Chair Mobility Min assist with Bilat UE's X 10 feet 150'-200' with bilateral UE mod I   Modified Independent with bilat UE's X feet level surfaces 500'   Car Transfers NT  NT Max assist  Mod assist   Stair negotiation: ascended and descended                   NT NA  To be assessed as appropriate  To be assessed as appropriate   Curb Step:   ascended and descended NT NA To be assessed To be assessed   BLE ROM PROM WNL's all aspects PROM WNL's all barrier. Rec HHPT for continued strengthening upon d/c.      Parth Damon, DPT  [id number]

## 2018-07-05 NOTE — PROGRESS NOTES
Discharge instructions given to patient and family. reviewed salamanca care instructions with return demonstration and verbalization. Dr appts and medications reviewed as well as patient portable profile. Verbalized understanding of questions reviewed. Discharged to home via ambulette.

## 2018-07-06 NOTE — DISCHARGE SUMMARY
[address]                   [address] [facility],  [facility]                                 DISCHARGE SUMMARY    PATIENT NAME: Rossana Nweell                 :        1945  MED REC NO:   [id number]                            ROOM:       0230  ACCOUNT NO:   [de-identified]                           ADMIT DATE: 2018  PROVIDER:     Emeli Cavazos MD                  DISCHARGE DATE: 2018    FINAL DIAGNOSES:  1. Incomplete spinal cord injury at T5.  2.  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 3.  Neurogenic bowel. 4.  Neurogenic bladder. 5.  Chronic pain, requiring long-term narcotics due to metastatic cancer. HISTORY:  The patient has a history of metastatic cancer to the spine. She  had spinal cord compression. She had decompressive surgery and was  transferred to acute rehab on 2018. HOSPITAL COURSE:  On admission, the patient was felt to be a good rehab  candidate. She had persistent weakness in both legs, although she did have  movement in all groups. She was not able to stand or support her body  weight. So, we basically worked at functioning at a wheelchair level. She  tolerated that well. She still required assistance, but her family was  instructed and she was ready for discharge on 2018. She still has  neurogenic bowel and bladder. We are going to continue with the Banda  catheter and a bowel routine, continue with wheelchair and hospital bed at  home, and she is going to follow up with Oncology. LABORATORY DATA:  CBC and metabolic profile were unremarkable. MEDICATIONS:  Percocet as needed for pain, Chronulac, and several vitamins.         Zoë Rush MD    D: 2018 10:00:24       T: 2018 10:02:24     [id number]  Job#: [id number]     Doc#: [id number]    CC:

## 2018-11-18 NOTE — ED PROVIDER NOTES
is oriented to person, place, and time. She appears well-developed and well-nourished. Non-toxic appearance. No distress. HENT:   Head: Normocephalic and atraumatic. Right Ear: Hearing and external ear normal.   Left Ear: Hearing and external ear normal.   Nose: Nose normal.   Mouth/Throat: Mucous membranes are normal.   Eyes: Conjunctivae are normal. No scleral icterus. Cardiovascular: Normal rate, regular rhythm, S1 normal, S2 normal, normal heart sounds and normal pulses. Pulmonary/Chest: Effort normal and breath sounds normal. No accessory muscle usage. No respiratory distress. She has no decreased breath sounds. She has no wheezes. She has no rhonchi. She has no rales. Abdominal: Soft. There is tenderness in the suprapubic area. There is no rigidity, no rebound and no guarding. The patient does have mild suprapubic abdominal tenderness to palpation. Musculoskeletal:   No pretibial pitting edema. Neurological: She is alert and oriented to person, place, and time. GCS eye subscore is 4. GCS verbal subscore is 5. GCS motor subscore is 6. Skin: Skin is warm and dry. She is not diaphoretic. Psychiatric: She has a normal mood and affect. Procedures    MDM             Patient is a 68-year-old female presenting to the emergency department with decreased urine output. Patient did have a Banda placed and did have large amount of urine output. No evidence of renal failure. Patient vomited UTI. Patient started on antibiotics. Patient will be discharged and follow up outpatient.  --------------------------------------------- PAST HISTORY ---------------------------------------------  Past Medical History:  has a past medical history of Arthritis; Cancer ([address] Hypertension; Osteopenia; and Sinus congestion. Past Surgical History:  has a past surgical history that includes Pilonidal cyst excision (1963);  Hip Arthroplasty (Right, 05/30/2017); pr laminectomy,>2 sgmt,thoracic (N/A, 5/29/2018); cyst removal (Left, 1970); and Carpal tunnel release (Right, 2004). Social History:  reports that she quit smoking about 12 years ago. She has a 40.00 pack-year smoking history. She has never used smokeless tobacco. She reports that she does not drink alcohol or use drugs. Family History: family history includes Dementia in her mother; Glaucoma in her mother; Heart Disease in her father. The patients home medications have been reviewed. Allergies:  Other    -------------------------------------------------- RESULTS -------------------------------------------------  Labs:  Results for orders placed or performed during the hospital encounter of 11/18/18   Urinalysis   Result Value Ref Range    Color, UA Yellow Straw/Yellow    Clarity, UA CLOUDY (A) Clear    Glucose, Ur Negative Negative mg/dL    Bilirubin Urine Negative Negative    Ketones, Urine Negative Negative mg/dL    Specific Gravity, UA 1.015 1.005 - 1.030    Blood, Urine TRACE (A) Negative    pH, UA 7.5 5.0 - 9.0    Protein, UA Negative Negative mg/dL    Urobilinogen, Urine 0.2 <2.0 E.U./dL    Nitrite, Urine Negative Negative    Leukocyte Esterase, Urine MODERATE (A) Negative   Microscopic Urinalysis   Result Value Ref Range    WBC, UA >20 0 - 5 /HPF    RBC, UA NONE 0 - 2 /HPF    Bacteria, UA MANY (A) /HPF   POCT Venous   Result Value Ref Range    POC Sodium 131 (L) 132 - 146 mmol/L    POC Potassium 4.0 3.5 - 5.0 mmol/L    POC Chloride 98 (L) 100 - 108 mmol/L    CO2 22 22 - 29 mmol/L    POC Anion Gap 11 7 - 16 mmol/L    POC Glucose 87 74 - 99 mg/dl    POC BUN 8 8 - 23 mg/dL    POC Creatinine <0.3 (L) 0.5 - 1.0 mg/dL    GFR Non-African American >60 >=60 mL/min/1.73    GFR  >60        Radiology:  No orders to display       ------------------------- NURSING NOTES AND VITALS REVIEWED ---------------------------  Date / Time Roomed:  11/18/2018  6:34 AM  ED Bed Assignment:  PARTHA/PARTHA    The nursing notes within the ED encounter and vital signs as below have been reviewed. /67   Pulse 102   Temp 99.1 °F (37.3 °C)   Resp 14   Ht 4' 11\" (1.499 m)   Wt 150 lb (68 kg)   SpO2 94%   BMI 30.30 kg/m²   Oxygen Saturation Interpretation: Normal      ------------------------------------------ PROGRESS NOTES ------------------------------------------  I have spoken with the patient and discussed todays results, in addition to providing specific details for the plan of care and counseling regarding the diagnosis and prognosis. Their questions are answered at this time and they are agreeable with the plan. I discussed at length with them reasons for immediate return here for re evaluation. They will followup with primary care by calling their office tomorrow. --------------------------------- ADDITIONAL PROVIDER NOTES ---------------------------------  At this time the patient is without objective evidence of an acute process requiring hospitalization or inpatient management. They have remained hemodynamically stable throughout their entire ED visit and are stable for discharge with outpatient follow-up. The plan has been discussed in detail and they are aware of the specific conditions for emergent return, as well as the importance of follow-up. Discharge Medication List as of 11/18/2018  8:28 AM      START taking these medications    Details   cefdinir (OMNICEF) 300 MG capsule Take 1 capsule by mouth 2 times daily for 10 days, Disp-20 capsule, R-0Print             Diagnosis:  1. Problem with urinary catheter ([address]    2. Urinary tract infection associated with indwelling urethral catheter, initial encounter [facility])        Disposition:  Patient's disposition: Discharge to home  Patient's condition is stable.                    Vidya Strong DO  11/19/18 2045

## 2018-12-07 PROBLEM — J96.21 ACUTE ON CHRONIC RESPIRATORY FAILURE WITH HYPOXIA (HCC): Status: ACTIVE | Noted: 2018-01-01

## 2018-12-07 PROBLEM — R07.9 CHEST PAIN: Status: ACTIVE | Noted: 2018-01-01

## 2018-12-07 PROBLEM — C34.90 LUNG CANCER (HCC): Status: ACTIVE | Noted: 2018-01-01

## 2018-12-07 PROBLEM — A41.9 SEPSIS (HCC): Status: ACTIVE | Noted: 2018-01-01

## 2018-12-07 PROBLEM — J96.20 ACUTE AND CHRONIC RESPIRATORY FAILURE (ACUTE-ON-CHRONIC) (HCC): Status: ACTIVE | Noted: 2018-01-01

## 2018-12-07 PROBLEM — J18.9 PNEUMONIA: Status: ACTIVE | Noted: 2018-01-01

## 2018-12-08 PROBLEM — E44.0 MODERATE PROTEIN-CALORIE MALNUTRITION (HCC): Chronic | Status: ACTIVE | Noted: 2018-01-01

## 2018-12-08 NOTE — PROGRESS NOTES
[address] [place], [place] [address]ist   Progress Note    Admitting Date and Time: 12/7/2018  1:58 PM  Admit Dx: Acute on chronic respiratory failure with hypoxia (HCC) [J96.21]    Subjective:    Pt resting comfortably. Family at bedside. Per RN: palliative care and hem/onc adjusted pain medications. ROS: denies fever, chills, cp, sob, n/v, HA unless stated above.  gabapentin  300 mg Oral Daily with breakfast    gabapentin  300 mg Oral Lunch    gabapentin  600 mg Oral Nightly    sodium chloride flush  10 mL Intravenous 2 times per day    enoxaparin  40 mg Subcutaneous Daily    azithromycin  500 mg Intravenous Q24H    And    cefTRIAXone (ROCEPHIN) IV  1 g Intravenous Q24H    fentanyl  1 patch Transdermal Q72H    fentaNYL  1 patch Transdermal Q72H    ipratropium-albuterol  1 ampule Inhalation Q4H WA       lactulose 10 g Daily PRN   sennosides-docusate sodium 2 tablet Daily PRN   sodium chloride flush 10 mL PRN   magnesium hydroxide 30 mL Daily PRN   ondansetron 4 mg Q6H PRN   albuterol 2.5 mg Q2H PRN   HYDROmorphone 0.5 mg Q3H PRN   morphine 20MG/ML 10 mg Q4H PRN        Objective:    BP (!) 100/55   Pulse 122   Temp 97.8 °F (36.6 °C) (Oral)   Resp 16   Ht 4' 11\" (1.499 m)   Wt 140 lb (63.5 kg)   SpO2 93%   BMI 28.28 kg/m²   General Appearance: alert and in no acute distress  Skin: warm and dry  Head: normocephalic and atraumatic  Eyes: pupils equal, round, and reactive to light, extraocular eye movements intact, conjunctivae normal  Neck: neck supple and non tender without mass   Pulmonary/Chest: clear to auscultation bilaterally- no wheezes, rales or rhonchi, normal air movement, no respiratory distress  Cardiovascular: normal rate, normal S1 and S2 and no carotid bruits  Abdomen: soft, non-tender, non-distended, normal bowel sounds, no masses or organomegaly  Extremities: edema of arms and legs.   Neurologic: no cranial nerve deficit and speech normal      Recent Labs      12/07/18   1459 12/08/18   0615   NA  133  135   K  4.9  4.4   CL  97*  100   CO2  24  27   BUN  9  7*   CREATININE  0.2*  0.2*   GLUCOSE  89  148*   CALCIUM  6.8*  6.2*       Recent Labs      12/07/18   1459  12/08/18   0615   WBC  12.8*  8.6   RBC  4.17  3.79   HGB  11.0*  9.8*   HCT  35.3  32.6*   MCV  84.7  86.0   MCH  26.4  25.9*   MCHC  31.2*  30.1*   RDW  20.4*  20.3*   PLT  248  289   MPV  9.4  8.8       Radiology:   CTA CHEST W CONTRAST   Final Result   1. Chronic appearing peripheral pulmonary embolism within a segmental   branch of the right upper lobe. 2. Masslike consolidation within the right upper lobe with a   questionable 23 mm nodule within the left upper lobe. There are   scattered subcentimeter nodules throughout bilateral lungs, compatible   with metastatic disease. Comparison with outside prior imaging if   available is recommended. 3. Moderate right and trace left pleural effusions with a right lower   lobe consolidation, likely atelectasis versus pneumonia. 4. Consolidative opacities throughout the left lung with associated   volume loss, possibly related to neoplasm or posttreatment related   changes. 5. Suggestion of interval progression of osseous metastatic disease   within the thoracic spine with multiple additional sclerotic osseous   lesions. 6. Questionable right adrenal nodule. XR CHEST PORTABLE   Final Result   1. Right internal jugular catheter and a permanent position. No   pneumothorax. 2. Stable masslike consolidation within the right midlung with diffuse   left lung airspace disease and small bilateral pleural effusions. XR CHEST PORTABLE   Final Result   Bilateral airspace disease and effusions. Findings likely infectious. Follow-up should be obtained to confirm resolution.           Assessment:  Principal Problem:    Acute on chronic respiratory failure with hypoxia (HCC)  Active Problems:    Spinal cord neoplasm    Lung cancer (HCC)    Pneumonia due to organism

## 2018-12-08 NOTE — H&P
traction bronchiectasis and associated volume loss. There is a questionable nodule within the left upper lobe (series 5, image 55) measuring approximately 20 mm x 23 mm. Within constraints of motion, there is scattered subcentimeter nodules throughout bilateral lungs. There is no pneumothorax. The heart is mildly enlarged. There is no large pericardial effusion. The thoracic aorta is normal in caliber and enhancement. The great vessel origins are patent. There is a prominent right hilar lymph node measuring approximately 12 mm in short axis. Redemonstrated are sclerotic metastatic lesions throughout the lower cervical, thoracic, and upper lumbar spine, which appears to have progressed compared to the previous CT from 9/8/2018. Redemonstrated are postsurgical changes from prior decompression laminectomy from T4 to T6, with suggestion of a soft tissue mass extending into the spinal canal at the level of T5. There is stable loss of vertebral body height at T11 with interval increased sclerosis. There are additional sclerotic lesions within the sternum, bilateral ribs, left distal clavicle, right scapula. Visualized portion of the upper abdomen demonstrates a questionable right adrenal nodule. 1. Chronic appearing peripheral pulmonary embolism within a segmental branch of the right upper lobe. 2. Masslike consolidation within the right upper lobe with a questionable 23 mm nodule within the left upper lobe. There are scattered subcentimeter nodules throughout bilateral lungs, compatible with metastatic disease. Comparison with outside prior imaging if available is recommended. 3. Moderate right and trace left pleural effusions with a right lower lobe consolidation, likely atelectasis versus pneumonia. 4. Consolidative opacities throughout the left lung with associated volume loss, possibly related to neoplasm or posttreatment related changes.  5. Suggestion of interval progression of osseous metastatic disease within

## 2018-12-08 NOTE — CONSULTS
Nefazodone Other (See Comments)     \"Knocks patient out\"        ROS: UNLESS STATED ABOVE PATIENT DENIES:  CONSTITUTIONAL:  fever, chill, rigors, nausea, vomiting, +fatigue. HEENT: blurry vision, double vision, hearing problem, tinnitus, hoarseness, dysphagia, odynophagia  RESPIRATORY: cough, +shortness of breath, sputum expectoration. CARDIOVASCULAR:  +Chest pain/pressure, palpitation, syncope, irregular beats  GASTROINTESTINAL:  abdominal or rectal pain, diarrhea, constipation, . GENITOURINARY:  Burning, frequency, urgency, incontinence, discharge  INTEGUMENTARY: rash, wound, pruritis  HEMATOLOGIC/LYMPHATIC:  Swelling, sores, gum bleeding, easy bruising, pica.   MUSCULOSKELETAL: + pain, edema, joint swelling or redness  NEUROLOGICAL:  light headed, dizziness, loss of consciousness, +weakness, change in memory, seizures, tremors, +neuropathy    Objective:     Physical Exam  BP (!) 95/55   Pulse 112   Temp 97.7 °F (36.5 °C) (Axillary)   Resp 18   Ht 4' 11\" (1.499 m)   Wt 140 lb (63.5 kg)   SpO2 95%   BMI 28.28 kg/m²     Gen: frail, elderly female, lethargic/somnolent; appears stated age,   HEENT:  Normocephalic, conjunctiva pink, no drainage, mucosa moist  Neck:  Supple  Lungs:  Diminished breath sounds L>R; no audible rhonchi or wheezes noted  Heart[de-identified]  RRR, no murmur, rub, or gallop noted during exam  Abd:  Soft, mildly TTP, non distended, BS+  Ext:  Moving all extremities, + edema UEs and LEs   Skin:  Warm and dry; multiple ecchymotic areas to UEs  Neuro:  PERRL, Alert, oriented x 3; following commands    Current Medications:  Inpatient medications reviewed: yes  Home Medications reviewed: yes    Results/Verification of Data Review  Objective data reviewed: labs, images, records, medication use, vitals and chart      - Advanced Directives: no advance directives; the patient's family will help with decision making    -Surrogate/Legal NOK: Spouse    Contacts:  Nisha Hurt     - Spiritual assessment: No

## 2018-12-08 NOTE — PLAN OF CARE
Problem: Pain:  Goal: Pain level will decrease  Pain level will decrease   Outcome: Ongoing    Goal: Control of acute pain  Control of acute pain   Outcome: Ongoing    Goal: Control of chronic pain  Control of chronic pain   Outcome: Ongoing      Problem: Risk for Impaired Skin Integrity  Goal: Tissue integrity - skin and mucous membranes  Structural intactness and normal physiological function of skin and  mucous membranes.    Outcome: Ongoing      Problem: Falls - Risk of:  Goal: Will remain free from falls  Will remain free from falls   Outcome: Ongoing    Goal: Absence of physical injury  Absence of physical injury   Outcome: Ongoing      Problem: OXYGENATION/RESPIRATORY FUNCTION  Goal: Patient will maintain patent airway  Outcome: Ongoing    Goal: Patient will achieve/maintain normal respiratory rate/effort  Respiratory rate and effort will be within normal limits for the patient  Outcome: Ongoing

## 2018-12-08 NOTE — PROGRESS NOTES
Nutrition Assessment    Type and Reason for Visit: Initial, Positive Nutrition Screen    Nutrition Recommendations: Recommend and start Ensure Enlive supplement BID and Magic Cup supplement once daily to help meet increased nutritional needs. Nutrition Assessment: Pt with hx of lung CA with mets to spine ; Patient states poor appetite x 1 month ; Pt meets criteria for moderate malnutrition AEB poor po intake x 1 month and muscle/fat wasting ; will start nutritional supplementation     Malnutrition Assessment:  · Malnutrition Status: Meets the criteria for moderate malnutrition  · Context: Chronic illness  · Findings of the 6 clinical characteristics of malnutrition (Minimum of 2 out of 6 clinical characteristics is required to make the diagnosis of moderate or severe Protein Calorie Malnutrition based on AND/ASPEN Guidelines):  1. Energy Intake-Less than 75% of estimated energy requirement for greater than or equal to 1 month, greater than or equal to 1 month    2. Weight Loss-Unable to assess, unable to assess  3. Fat Loss-Mild subcutaneous fat loss, Orbital  4. Muscle Loss-Mild muscle mass loss, Temples (temporalis muscle), Clavicles (pectoralis and deltoids)  5. Fluid Accumulation-Mild fluid accumulation, Extremities  6.  Strength-Not measured    Nutrition Risk Level: Moderate    Nutrient Needs:  · Estimated Daily Total Kcal: 7908-1262 (REE 1057 x 1.2-1.3 SF)  · Estimated Daily Protein (g): 65-78 (1.5-1.8g/kg IBW)  · Estimated Daily Total Fluid (ml/day): 3735-7545    Nutrition Diagnosis:   · Problem:  Moderate malnutrition, In context of chronic illness  · Etiology: related to Insufficient energy/nutrient consumption, Catabolic illness     Signs and symptoms:  as evidenced by Patient report of, Diet history of poor intake, Mild muscle loss, Mild loss of subcutaneous fat    Objective Information:  · Nutrition-Focused Physical Findings: I&Os WNL ; 1-2+ edema ; hypoactive BS ; soft abd ; upper dentures ; dry tongue ; A&O x 4 ; hemorrhoids ; numbness to ext ; pale ; mild muscle and fat wasting ; redness to buttocks     · Wound Type: Open Wounds (wounds noted to coccyx and L buttocks)     · Current Nutrition Therapies:  · Oral Diet Orders: General   · Oral Diet intake: 26-50% (per nursing ; no meals recorded in flowsheets at this time ; pt states poor appetite x 1 month)  · Oral Nutrition Supplement (ONS) Orders: None     · Anthropometric Measures:  · Ht: 4' 11\" (149.9 cm)   · Current Body Wt: 140 lb (63.5 kg) (12/7/18, no method ; 12/8 bedscale reading 66#, will not use d/t error)  · Admission Body Wt: 140 lb (63.5 kg) (12/7/18, no method)  · Usual Body Wt: 159 lb (72.1 kg) (5/27/18, bedscale)  ·   EMR shows possible weight loss of 19# (159# bedscale on 5/27/18 to 140# no method on 12/7/18)  · Ideal Body Wt: 95 lb (43.1 kg), % Ideal Body 147%  · BMI Classification: BMI 25.0 - 29.9 Overweight    Nutrition Interventions:   Continue current diet, Start ONS  Continued Inpatient Monitoring, Coordination of Care    Nutrition Evaluation:   · Evaluation: Goals set   · Goals: Patient will consume 50-75% of most meals and supplements served     · Monitoring: Meal Intake, Supplement Intake, Diet Tolerance, Skin Integrity, Wound Healing, Mental Status/Confusion, Pertinent Labs, Weight, I&O, Monitor Bowel Function, Monitor Hemodynamic Status, Chewing/Swallowing      Electronically signed by Sony Vázquez RD, TERESA on 12/8/18 at 11:42 AM    Contact Number: [id number]

## 2018-12-09 NOTE — PROGRESS NOTES
WBC  12.8*  8.6   RBC  4.17  3.79   HGB  11.0*  9.8*   HCT  35.3  32.6*   MCV  84.7  86.0   MCH  26.4  25.9*   MCHC  31.2*  30.1*   RDW  20.4*  20.3*   PLT  248  289   MPV  9.4  8.8       CULTURE BLOOD #1 [[id number]] (Abnormal) Collected: 12/08/18 0045   Order Status: Completed Specimen: Blood from Blood Updated: 12/09/18 1405    Blood Culture, Routine -- (A)    Gram stain performed from blood culture bottle media   Gram positive cocci in clusters   Previously positive blood culture called    Narrative:     CALL  [facility]  [facility] tel. ,  Previous panic on this admission - call not needed per SOP, 12/09/2018 14:04,  by [address] [place] [address] Place #2 [[id number]] Collected: 12/08/18 0045   Order Status: Completed Specimen: Blood Updated: 12/09/18 1235    Culture, Blood 2 24 Hours- no growth   Narrative:     Source: BLOOD       Site: Hand, Left              Culture blood #1 [[id number]] (Abnormal) Collected: 12/07/18 1540   Order Status: Completed Specimen: Blood from Blood Updated: 12/09/18 1119    Blood Culture, Routine -- (A)    Gram stain performed from blood culture bottle media   Gram positive cocci in clusters   Coagulase negative Staph by PNA fish    Narrative:     CALL  [facility]  [facility] tel. ,  Microbiology results called to and read back by Beto Renner RN, 12/09/2018  09:41, by [facility]     Culture Blood #2 [[id number]] Collected: 12/07/18 1805   Order Status: Completed Specimen: Blood Updated: 12/08/18 2135    Culture, Blood 2 24 Hours- no growth   Narrative:     Source: BLOOD       Site: right cvc               Respiratory Panel, Film Array [[id number]] Collected: 12/08/18 0045   Updated: 12/09/18 0515    Specimen Type: Nose    Specimen Source: Nasopharyngeal    Narrative:     Source: NP       Site: Nose&Nose       Result: negative        Radiology:      TTE procedure:Echocardiogram W/Contrast, Echo W/Bubble Study.     Procedure Date  Date: 12/08/2018 Start: 09:48 AM    Study Location: Portable  Technical Quality: Limited confirm resolution. Assessment:  Principal Problem:    Acute on chronic respiratory failure with hypoxia (HCC)  Active Problems:    Spinal cord neoplasm    Lung cancer ([facility]    Pneumonia due to organism    Sepsis ([facility]    Chest pain    Moderate protein-calorie malnutrition ([facility]    Palliative care by specialist  Resolved Problems:    * No resolved hospital problems. *      Plan:    1. Metastatic lung cancer--hem/onc recommends no further treatment. [facility] consulted and discussed code status with patient. Pain medications adjusted. Patient and family requested hospice consult and met with them today but did not want to go with their services at this point in time. 2. Acute on chronic respiratory failure with hypoxia--continue supplemental oxygen. 3. Postobstructive pneumonia--placed on ceftriaxone and azithromycin. Continue for now but if goes with Hospice would discontinue. R IJ placed in ER 12/7  4. Positive blood cultures--Two out four sets with one bottle positive. She was given one time dose of Vanco today until family could speak to Hospice. Family wants her to be treated for sepsis before considering Hospice. 5. Advanced directives--currently FULL CODE but appears is considering DNR.  states she does not want resuscitation but she stated wants to talk it over with him later. I have feeling will agree to LIMITED CODE meds only. Family made aware even that even if they do not with hospice they can place limitations. They are leaning against heroic measures but did not want to commit to changing code status.      Case discussed with  Zoe Gallegos and son Steve Dykes and his wife Rajesh Dye at bedside. NOTE: This report was transcribed using voice recognition software.  Every effort was made to ensure accuracy; however, inadvertent computerized transcription errors may be present.     Electronically signed by Veronica Arceo MD on 12/9/2018 at 5:34 PM

## 2018-12-09 NOTE — PLAN OF CARE
Problem: Pain:  Goal: Pain level will decrease  Pain level will decrease   Outcome: Ongoing      Problem: Risk for Impaired Skin Integrity  Goal: Tissue integrity - skin and mucous membranes  Structural intactness and normal physiological function of skin and  mucous membranes.    Outcome: Met This Shift

## 2018-12-09 NOTE — PROGRESS NOTES
dextrose 5 % 250 mL IVPB, 20 mg/kg, Intravenous, Once, Conchita Booker MD    gabapentin (NEURONTIN) capsule 300 mg, 300 mg, Oral, Daily with breakfast, ERLIN Harrison - CNP, 300 mg at 12/09/18 0848    gabapentin (NEURONTIN) capsule 300 mg, 300 mg, Oral, Lunch, Amy Griffin APRN - CNP, 300 mg at 12/09/18 1324    gabapentin (NEURONTIN) capsule 600 mg, 600 mg, Oral, Nightly, Amy Griffin APRN - CNP, 600 mg at 12/09/18 0036    lactulose (CHRONULAC) 10 GM/15ML solution 10 g, 10 g, Oral, Daily PRN, ERLIN Harrison - CNP    sennosides-docusate sodium (SENOKOT-S) 8.6-50 MG tablet 2 tablet, 2 tablet, Oral, Daily PRN, ERLIN Harrison - CNP, 2 tablet at 12/08/18 0818    sodium chloride flush 0.9 % injection 10 mL, 10 mL, Intravenous, 2 times per day, Amy Griffin APRNELLI - CNP, 10 mL at 12/09/18 0849    sodium chloride flush 0.9 % injection 10 mL, 10 mL, Intravenous, PRN, ERLIN Harrison - CNP    magnesium hydroxide (MILK OF MAGNESIA) 400 MG/5ML suspension 30 mL, 30 mL, Oral, Daily PRN, Amy Griffin APRNELLI - CNP    ondansetron (ZOFRAN) injection 4 mg, 4 mg, Intravenous, Q6H PRN, ERLIN Harrison - CNP    enoxaparin (LOVENOX) injection 40 mg, 40 mg, Subcutaneous, Daily, ERLIN Harrison - CNP, 40 mg at 12/09/18 0848    albuterol (PROVENTIL) nebulizer solution 2.5 mg, 2.5 mg, Nebulization, Q2H PRN, ERLIN Harrison CNP    azithromycin (ZITHROMAX) 500 mg in D5W 250ml Vial Mate, 500 mg, Intravenous, Q24H, Stopped at 12/09/18 0144 **AND** cefTRIAXone (ROCEPHIN) 1 g in sterile water 10 mL IV syringe, 1 g, Intravenous, Q24H, Seleta Dieter, APRN - CNP, 1 g at 12/09/18 0035    fentanyl (DURAGESIC) patch REMOVAL, 1 patch, Transdermal, Q72H, Viviann Bosworth, MD, 1 patch at 12/08/18 1728    fentaNYL (DURAGESIC) 75 MCG/HR 1 patch, 1 patch, Transdermal, Q72H, Viviann Bosworth, MD, 1 patch at 12/08/18 1725    HYDROmorphone (DILAUDID) injection 0.5 mg, 0.5 mg, Intravenous, Q3H PRN, Viviann Bosworth, MD, 0.5 mg at

## 2018-12-10 PROBLEM — J96.01 ACUTE RESPIRATORY FAILURE WITH HYPOXIA (HCC): Status: ACTIVE | Noted: 2018-01-01

## 2018-12-10 NOTE — CONSULTS
drink alcohol. Home Medications  No current facility-administered medications on file prior to encounter. Current Outpatient Prescriptions on File Prior to Encounter   Medication Sig Dispense Refill    lactulose (CHRONULAC) 10 GM/15ML solution Take 15 mLs by mouth daily (Patient taking differently: Take 10 g by mouth daily as needed (Constipation) ) 1 Bottle 1    sennosides-docusate sodium (SENOKOT-S) 8.6-50 MG tablet Take 2 tablets by mouth daily as needed for Constipation 60 tablet 0       Allergies  Allergies   Allergen Reactions    Ativan [Lorazepam] Other (See Comments)     \"Hallucinates\"    Cashews [Macadamia Nut Oil] Hives and Swelling    Other Other (See Comments)     Seasonal allergies, cough and sinus drainage, sneeze    Trazodone And Nefazodone Other (See Comments)     \"Knocks patient out\"        Review of Systems  Please see HPI above. All bolded are positive. All un-bolded are negative.   Gen: fever, chills, fatigue, weakness, diaphoresis, increase in thirst, unintentional weight change, loss of appetite  Head: headache, vision change, hearing loss  Chest: chest pain, chest heaviness, palpitations  Lungs: shortness of breath, wheezing, coughing  Abdomen: abdominal pain, nausea, vomiting, diarrhea, constipation, melena, hematochezia, hematemesis  Extremities: lower extremity edema, myalgias, arthralgias  Urinary: dysuria, hematuria, or increase in frequency  Neurologic: lightheadedness, dizziness, confusion, syncope  Psychiatric: depression, suicidal ideation, or anxiety    Objective  Vitals:    12/10/18 0751   BP: 89/60   Pulse: 107   Resp: 14   Temp: 98.9 °F (37.2 °C)   SpO2: 95%       Physical Exam:  General: Awake, alert, oriented to person, place, time, and purpose, appears stated age, cooperative, no acute distress, pleasant   Head: Normocephalic, atraumatic  Eyes: Conjunctivae/corneas clear, Sclera non icteric  Neck: No JVD, no adenopathy, neck is supple, trachea is midline  Back: ROM measures approximately 42 mm x 31 mm. There consolidative opacities within the left upper and lower lobes with areas of traction bronchiectasis and associated volume loss. There is a questionable nodule within the left upper lobe (series 5, image 55) measuring approximately 20 mm x 23 mm. Within constraints of motion, there is scattered subcentimeter nodules throughout bilateral lungs. There is no pneumothorax. The heart is mildly enlarged. There is no large pericardial effusion. The thoracic aorta is normal in caliber and enhancement. The great vessel origins are patent. There is a prominent right hilar lymph node measuring approximately 12 mm in short axis. Redemonstrated are sclerotic metastatic lesions throughout the lower cervical, thoracic, and upper lumbar spine, which appears to have progressed compared to the previous CT from 9/8/2018. Redemonstrated are postsurgical changes from prior decompression laminectomy from T4 to T6, with suggestion of a soft tissue mass extending into the spinal canal at the level of T5. There is stable loss of vertebral body height at T11 with interval increased sclerosis. There are additional sclerotic lesions within the sternum, bilateral ribs, left distal clavicle, right scapula. Visualized portion of the upper abdomen demonstrates a questionable right adrenal nodule. 1. Chronic appearing peripheral pulmonary embolism within a segmental branch of the right upper lobe. 2. Masslike consolidation within the right upper lobe with a questionable 23 mm nodule within the left upper lobe. There are scattered subcentimeter nodules throughout bilateral lungs, compatible with metastatic disease. Comparison with outside prior imaging if available is recommended. 3. Moderate right and trace left pleural effusions with a right lower lobe consolidation, likely atelectasis versus pneumonia.  4. Consolidative opacities throughout the left lung with associated volume loss, possibly related

## 2018-12-10 NOTE — PROGRESS NOTES
05/2017       Past Surgical History:   Procedure Laterality Date    CARPAL TUNNEL RELEASE Right 2004    CYST REMOVAL Left 1970    left underarm    HIP ARTHROPLASTY Right 05/30/2017    PILONIDAL CYST EXCISION  1963    MO LAMINECTOMY,>2 SGMT,THORACIC N/A 5/29/2018    DECOMPRESSIVE THORACIC LAMINECTOMY T4, T5, T6 performed by Franky Rodgers MD at [address]       Family History   Problem Relation Age of Onset    Glaucoma Mother     Dementia Mother     Heart Disease Father         chf       Unable to obtain family history due to N/A- family history available    Allergies   Allergen Reactions    Ativan [Lorazepam] Other (See Comments)     \"Hallucinates\"    Cashews [Macadamia Nut Oil] Hives and Swelling    Other Other (See Comments)     Seasonal allergies, cough and sinus drainage, sneeze    Trazodone And Nefazodone Other (See Comments)     \"Knocks patient out\"        Review of Systems:   See palliative care ROS/ESAS below; Detail ROS unable to obtain due to patient's mental status    Social history:  Veteranstatus: no  Marital status:   Living status: with family:  spouse   Work history: housewife. Family Meeting:  Participants:Spouse, Child and patient  Family meeting was held to discuss:diagnosis, prognosis, treatment options, goals of care, prior expressed wishes, advanced care planning, symptommanagement and discharge plan      Objective:     Physical Exam  BP 89/60   Pulse 107   Temp 98.9 °F (37.2 °C) (Axillary)   Resp 14   Ht 4' 11\" (1.499 m)   Wt 139 lb 9.6 oz (63.3 kg)   SpO2 95%   BMI 28.20 kg/m²     Physical Exam   Constitutional: She is oriented to person, place, and time. No distress. Chronically ill appearing female. Stoic in appearance. HENT:   Head: Normocephalic and atraumatic. Right Ear: External ear normal.   Left Ear: External ear normal.   Nose: Nose normal.   Mouth/Throat: Oropharynx is clear and moist. No oropharyngeal exudate.    Eyes: Pupils are equal, round, and

## 2018-12-10 NOTE — PROGRESS NOTES
Patient's salamanca catheter irrigated per orders with 60ml of sterile water, 60 ml water returned. Salamanca draining light yellow urine, sediment to inside of salamanca tubing. Patient tolerated procedure well.

## 2018-12-10 NOTE — PROGRESS NOTES
presented to the ER with several days of progressive dyspnea. EMS found her hypoxic in the 80s. She was brought to the ER and was hospitalized for possible PNA. Assessment:    1. Acute respiratory failure with hypoxia  2. Probable right LL PNA  3. Moderate right pleural effusion  4. Probable severe sepsis  5. CoNS bacteremia - r/o contaminant  6. Acute on chronic pain  7. Opioid tolerance  8. Myoclonic jerks - likely medication related  9. Hx of metastatic lung cancer   10. Lung mets  11. Spine mets with paraplegia  12. Chronic PE  13.  Poor prognosis       Plan:  - start vanc  - ID consult  - repeat blood cx  - procal  - DC azithromycin  - Reduce neurontin  - hospice on board- decision pending  - will consider thoracentesis if consistent with goals of care  - comfort   - Discussed with multidisciplinary care team    DVT ppx: lovenox    Disposition: 1- 4 days, hospice vs ECF    Electronically signed by Therese Stewart MD on 12/10/2018 at 1:50 PM

## 2018-12-10 NOTE — PROGRESS NOTES
- CNP, 300 mg at 12/10/18 0920    gabapentin (NEURONTIN) capsule 300 mg, 300 mg, Oral, Lunch, Margaret Renee APRN - CNP, 300 mg at 12/10/18 1219    gabapentin (NEURONTIN) capsule 600 mg, 600 mg, Oral, Nightly, Margaret Renee, APRN - CNP, 600 mg at 12/09/18 2353    lactulose (CHRONULAC) 10 GM/15ML solution 10 g, 10 g, Oral, Daily PRN, ERLIN Ortega - CNP    sennosides-docusate sodium (SENOKOT-S) 8.6-50 MG tablet 2 tablet, 2 tablet, Oral, Daily PRN, ERLIN Ortega - CNP, 2 tablet at 12/08/18 0818    sodium chloride flush 0.9 % injection 10 mL, 10 mL, Intravenous, 2 times per day, ERLIN Ortega - CNP, 10 mL at 12/10/18 6269    sodium chloride flush 0.9 % injection 10 mL, 10 mL, Intravenous, PRN, ERLIN Ortega - CNP    magnesium hydroxide (MILK OF MAGNESIA) 400 MG/5ML suspension 30 mL, 30 mL, Oral, Daily PRN, Margaret Renee APRN - CNP    ondansetron (ZOFRAN) injection 4 mg, 4 mg, Intravenous, Q6H PRN, ERLIN Ortega - CNP    enoxaparin (LOVENOX) injection 40 mg, 40 mg, Subcutaneous, Daily, ERLIN Ortega - CNP, 40 mg at 12/10/18 0920    albuterol (PROVENTIL) nebulizer solution 2.5 mg, 2.5 mg, Nebulization, Q2H PRN, ERLIN Ortega - CNP    azithromycin (ZITHROMAX) 500 mg in D5W 250ml Vial Mate, 500 mg, Intravenous, Q24H, Stopped at 12/10/18 0053 **AND** cefTRIAXone (ROCEPHIN) 1 g in sterile water 10 mL IV syringe, 1 g, Intravenous, Q24H, ERLIN Ortega - CNP, 1 g at 12/09/18 2353    fentanyl (DURAGESIC) patch REMOVAL, 1 patch, Transdermal, Q72H, Lsi Sal MD, 1 patch at 12/08/18 1728    fentaNYL (DURAGESIC) 75 MCG/HR 1 patch, 1 patch, Transdermal, Q72H, Lis Sal MD, 1 patch at 12/08/18 1725    HYDROmorphone (DILAUDID) injection 0.5 mg, 0.5 mg, Intravenous, Q3H PRN, Lis Sal MD, 0.5 mg at 12/09/18 1193    morphine 20MG/ML concentrated solution 10 mg, 10 mg, Oral, Q4H PRN, JAIME Pérez, 10 mg at 12/09/18 1325    ipratropium-albuterol (DUONEB) nebulizer solution 1 ampule, 1 ampule, Inhalation, Q4H WA, Brianna Yumiko, DO, 1 ampule at 12/10/18 1308    Assessment:    Principal Problem:    Acute on chronic respiratory failure with hypoxia (HCC)  Active Problems:    Spinal cord neoplasm    Lung cancer ([address]    Pneumonia due to organism    Sepsis ([address]    Chest pain    Moderate protein-calorie malnutrition ([address]    Palliative care by specialist  Resolved Problems:    * No resolved hospital problems. *    59-year-old woman with history of metastatic adenocarcinoma of the lungs PT L1 5 percent, EGFR, ALK and ROS-1 negative. She had XRT to T5 vertebral body in August 2017. She comes in 4 cycles of first-line chemotherapy with carboplatin and Alimta through November 2017. She says 2nd line treatment with Keytruda. She had decompressive laminectomy in May 2018 for disease progression. Not able to walk since then. Evidence of disease progression in the liver bone and lungs in September 2018. Keytruda discontinued. Patient is currently being admitted to the hospital for acute over chronic respiratory failure.     CTA chest 12/7/18 shoulder following:          Impression:         1. Chronic appearing peripheral pulmonary embolism within a segmental  branch of the right upper lobe. 2. Masslike consolidation within the right upper lobe with a  questionable 23 mm nodule within the left upper lobe. There are  scattered subcentimeter nodules throughout bilateral lungs, compatible  with metastatic disease. Comparison with outside prior imaging if  available is recommended. 3. Moderate right and trace left pleural effusions with a right lower  lobe consolidation, likely atelectasis versus pneumonia. 4. Consolidative opacities throughout the left lung with associated  volume loss, possibly related to neoplasm or posttreatment related  changes.   5. Suggestion of interval progression of osseous metastatic disease  within the thoracic spine with multiple additional sclerotic

## 2018-12-10 NOTE — PROGRESS NOTES
aspiration              and/or pneumonia Difficulty swallowing pills              Feeding method:  Independent in self-feeding             Needs assistance to self-feed             Dependent for feeding          x   Endurance during meals:  Good for duration of meal            Fair for duration of meal           Poor for duration of meal           x Variable (comment)              Mental status:    Alert           x Responsive          x  Cooperative          x    Reduced cognition          Lethargic                Impulsive              Uncooperative              Combative             Unresponsive                OBJECTIVE ASSESSMENT:  Current respiratory status:  Room air              Nasal cannula          x O2 mask             Non-rebreather mask              Tracheostomy,            room air    Tracheostomy,             supplemental O2   BiPAP            Vent dependent              Respiratory sufficiency and coordination:  WNL          x   Mildly impaired            Moderately impaired            Severely impaired              Current nutritional status:  [x] Oral   [] NPO  Oral mechanism examination:      [] Adequate lingual/labial strength [x] Generalized oral weakness    [] Left labiobuccal weakness  [] Right labiobuccal weakness    [] Left lingual deviation  [] Right lingual deviation     [] Oral apraxia             [] CNT  Dentition: [x] Natural    [] Missing teeth/teeth in poor repair    [] Edentulous    [] Dentures    [] Implants  Vocal quality prior to PO intake: [x] WFL  [] Weak  [] Wet    Factors affecting performance:  No difficulties participating   in assessment            Impairment or difficulty noted   in mental status            Impairment or difficulty noted   in following directions            Impairment or difficulty noted   In endurance          xxx   Positioning:   Seated in wheelchair/chair               Upright seated in bed           x Reclined, head of bed  >   °                Saliva Wet vocal quality          Wet respirations          Change in respiratory pattern        Skin flushing          Eye watering              [] Congested cough throughout evaluation   [] Absent swallow    Comments: patient did exhibit a cough x1 following intake of water. No cough noted on intake of juice or soup broth. With utilization of modified blue dye tracheostomy protocol:  Upon suctioning, blue dye was:      [] present in secretions      [] absent from secretions   Comments:    EDUCATION/GOAL PLANNING  Education completed with:      [x] patient      [] family    Regarding:      [x] diagnosis      [x] treatment      [x] prognosis      [x] plan of care  Speech Pathologist (SLP) completed education with the patient/family regarding type of swallowing impairment. Reviewed current solid/liquid consistency diet recommendations and discussed compensatory strategies to ensure safe PO intake. Reviewed aspiration precautions. Encouraged pt and/or family to engage SLP in unstructured Q&A session relative to identified deficit areas. Patient indicated understanding of all information provided via satisfactory verbal response. [x] Patient was an active participant in goal planning process. [] Patient was unable to participate in goal planning process. [] Goal planning not appropriate at this time as intervention was not recommended. This plan will be re-evaluated and revised in  1  week   if warranted. Prognosis for improvements is    [x] good          [] fair       [] guarded       [] poor  [x] The admitting diagnosis and active problem list as listed below have been reviewed prior to the initiation of this evaluation.    Acute on chronic respiratory failure with hypoxia (HCC) [J96.21]     Patient Active Problem List   Diagnosis    S/P hip replacement    Weakness of both legs    Metastatic cancer to spine ([address]    Spinal cord neoplasm    Acute on chronic respiratory failure with hypoxia ([address]    Lung

## 2018-12-11 NOTE — PROGRESS NOTES
Pharmacy Consultation Note  (Antibiotic Dosing and Monitoring)    Initial consult date: 18  Consulting physician: Dr. Isela Jimenez  Drug(s): Vancomycin  Indication: Bacteremia    Ht Readings from Last 1 Encounters:   18 4' 11\" (1.499 m)     Wt Readings from Last 1 Encounters:   18 135 lb 6.4 oz (61.4 kg)       Age/  Gender IBW DW  Allergy Information   72 y.o. female  63.5 kg  Ativan [lorazepam]; Cashews [macadamia nut oil]; Other; and Trazodone and nefazodone                 Date  WBC BUN/CR Drug/Dose Time   Given Level(s)   (Time) Comments     Day 1 8.6  () 7/0.2  () Vancomycin 1250 mg IV x1 1605     12/10 -- -- Vancomycin 1,000 mg IV Q24H 1504      11.8 8/0.2 Vancomycin 1,000 mg IV Q24H <1600>                CrCl cannot be calculated (Unknown ideal weight.). Intake/Output Summary (Last 24 hours) at 18 1327  Last data filed at 18 1253   Gross per 24 hour   Intake             1856 ml   Output              625 ml   Net             1231 ml       Temp max: Temp (24hrs), Av.6 °F (37 °C), Min:98.4 °F (36.9 °C), Max:98.9 °F (37.2 °C)      Cultures:  available culture and sensitivity results were reviewed in EPIC  · : Blood Culture #1: Staph epidermidis  · : Blood Culture #2: NGTD  · : Blood Culture #1: Staph epidermidis  · : Blood Culture #2: NGTD  · : Resp Film Array: Negative  · 12/10: Blood culture: pending    Assessment:  · Consulted by Dr. Isela Jimenez to dose/monitor vancomycin  · Goal trough level:  15-20 mcg/mL  · 72 YOF with Pneumonia and now bacteremia.  Patient with metastatic lung cancer, Hospice saw patient, family would like sepsis treated before considering Hospice  · SCr 0.2 today  · 12/10: patient remains full code at this time; blood cx growing staph spp; echo negative for vegetations  · : ID consulted, hospice following    Plan:  · Vancomycin 1,000 mg IV Q24H  · Trough at steady state if vanco continued  · Pharmacist will

## 2018-12-12 NOTE — PROGRESS NOTES
at 12/12/18 0944    Assessment:    Patient Active Problem List   Diagnosis    S/P hip replacement    Weakness of both legs    Metastatic cancer to spine [facility])    Spinal cord neoplasm    Acute respiratory failure with hypoxia (HCC)    [facility]    Pneumonia due to organism    Sepsis ([facility]    Chest pain    Moderate protein-calorie malnutrition ([facility]    Palliative care by specialist    Goals of care, counseling/discussion       Plan:  72year old female with metastatic stage IV adenocarcinoma lung to bones,liver progressed after multiple lines of treatment last being [place] Admitted with progressive weakness, dyspnea, staph epi bacteremia. Prognosis poor. Have discussed hospice previously with patient and family. Family has been wanting hospice but patient has not been as receptive. She continues to deteriorate and is hospice appropriate.  -cont lovenox for chronic PE.   -Antibiotics for pna  - to arrange home hospice        Shari Ortiz  12:03 PM  12/12/2018

## 2018-12-13 PROBLEM — E43 SEVERE PROTEIN-CALORIE MALNUTRITION (HCC): Status: ACTIVE | Noted: 2018-01-01

## 2018-12-13 NOTE — CONSULTS
results found for: TSH  TROPONIN:  No components found for: TROP  BNP:  No results found for: BNP  FASTING LIPID PANEL:  No results found for: CHOL, HDL, TRIG  CTA CHEST W CONTRAST   Final Result   1. Chronic appearing peripheral pulmonary embolism within a segmental   branch of the right upper lobe. 2. Masslike consolidation within the right upper lobe with a   questionable 23 mm nodule within the left upper lobe. There are   scattered subcentimeter nodules throughout bilateral lungs, compatible   with metastatic disease. Comparison with outside prior imaging if   available is recommended. 3. Moderate right and trace left pleural effusions with a right lower   lobe consolidation, likely atelectasis versus pneumonia. 4. Consolidative opacities throughout the left lung with associated   volume loss, possibly related to neoplasm or posttreatment related   changes. 5. Suggestion of interval progression of osseous metastatic disease   within the thoracic spine with multiple additional sclerotic osseous   lesions. 6. Questionable right adrenal nodule. XR CHEST PORTABLE   Final Result   1. Right internal jugular catheter and a permanent position. No   pneumothorax. 2. Stable masslike consolidation within the right midlung with diffuse   left lung airspace disease and small bilateral pleural effusions. XR CHEST PORTABLE   Final Result   Bilateral airspace disease and effusions. Findings likely infectious. Follow-up should be obtained to confirm resolution. XR CHEST PORTABLE    (Results Pending)   CT ABDOMEN PELVIS WO CONTRAST Additional Contrast? None    (Results Pending)   XR CHEST 1 VW    (Results Pending)         I have personally reviewed the laboratory, cardiac diagnostic and radiographic testing as outlined above:      IMPRESSION:  1.  Bacteremia: Will need Transesophageal Echocardiogram to rule out infective endocarditis, However, considering patients limited code status, including no

## 2018-12-13 NOTE — PROGRESS NOTES
Patient was very lethargic at beginning of shift. BP was running low at 89/52 so morphine was initially held. As the shift progressed the patient became increasingly aggittated, pulling off her oxygen, gown, and yelling out, \"daddy\". One on one was implemented for her safety. Was notified at 2145 that patient had sustained 33 beats of vtach at [address], which was prior to my shift. Dr. Jemima Flores, Dr Tristan Haji,  nursing supervisor, and  were notified and new orders were given for patient. Morphine was able to be administered later when BP soraida, after which, patient did rest comfortably for a small duration of time. Upon awakening, she was again aggittated and pulling off gown, oxygen, and yelling out. Reassessments for continuation of one on one to be made throughout the shift and patient will be monitored closely.

## 2018-12-13 NOTE — PROGRESS NOTES
Nutrition Assessment    Type and Reason for Visit: Reassess    Nutrition Recommendations: Continue current diet, Continue current ONS. MD consideration to start appetite stimulant of MD choice. Possible consideration for PEG? Oral intake is insufficient and not improving. If pt does not pursue hospice despite notes indicating that the family is considering it. Please consider PEG placement and re-consult RD for recommendations. Nutrition Assessment: Pt intakes are so poor-offering both ensure and magic cup. Pt intakes insufficient. Highly recommend starting appetite stimulant of MD choice. Pt tearful at time of visit. Possible hospice post sepsis resolve however, if hospice not pursued please consider tube feedings via PEG placement. If this is an option please reconsult with dietitian for recommendations. Malnutrition Assessment:  · Malnutrition Status: Meets the criteria for severe malnutrition  · Context: Chronic illness  · Findings of the 6 clinical characteristics of malnutrition (Minimum of 2 out of 6 clinical characteristics is required to make the diagnosis of moderate or severe Protein Calorie Malnutrition based on AND/ASPEN Guidelines):  1. Energy Intake-Less than 50% of estimated energy requirement for greater than or equal to 5 days, greater than or equal to 5 days    2. Weight Loss-Unable to assess (d/t fluid), unable to assess  3. Fat Loss-Severe subcutaneous fat loss, Fat overlying the ribs  4. Muscle Loss-Moderate muscle mass loss, Temples (temporalis muscle), Clavicles (pectoralis and deltoids), Calf (gastrocnemius)  5. Fluid Accumulation-Unable to assess, Extremities  6.   Strength-Not measured    Nutrition Risk Level: High    Nutrient Needs:  · Estimated Daily Total Kcal: 1786-0138  · Estimated Daily Protein (g): 115-125 (1.8-2)  · Estimated Daily Total Fluid (ml/day): 0861-2484 (1ml/kcal)    Nutrition Diagnosis:   · Problem: Severe malnutrition, In context of chronic illness  · Etiology: related to Catabolic illness     Signs and symptoms:  as evidenced by Intake 0-25%, Diet history of poor intake, Weight loss, Presence of wounds    Objective Information:  · Nutrition-Focused Physical Findings: pt anxious, tearful, abd-rounded, soft, no gaurding, hypoactive, nontender, Gen +2 edema, +2 RUE/+3 LUE/+1 RLE/+2 LLE, skin-red buttocks, excoriation abd folds, wound, +I/O. Pt w/ moderate to severe muscle and fat wasting.    · Wound Type:  (wound-coccyx, L buttocks)  · Current Nutrition Therapies:  · Oral Diet Orders: General   · Oral Diet intake: 1-25%  · Oral Nutrition Supplement (ONS) Orders: Standard High Calorie Oral Supplement, Frozen Oral Supplement  · ONS intake: 1-25%  · Anthropometric Measures:  · Ht: 4' 11\" (149.9 cm)   · Current Body Wt: 142 lb (64.4 kg) (12/13 BS)  · Admission Body Wt: 140 lb (63.5 kg) (12/7/18, no method)  · Usual Body Wt: 159 lb (72.1 kg) (5/27/18, bedscale)  · % Weight Change: VERONICA d/t fluid fluctuations-suspect further wt loss since CBW with significant fluid and UBW higher   · Ideal Body Wt: 95 lb (43.1 kg), % Ideal Body 148%  · BMI Classification: BMI 25.0 - 29.9 Overweight    Nutrition Interventions:   Continued Inpatient Monitoring, Education not appropriate at this time, Coordination of Care    Nutrition Evaluation:   · Evaluation: Goals set   · Goals: intakes >50% meal tray/ONS    · Monitoring: Meal Intake, Supplement Intake, Diet Tolerance, Skin Integrity, Wound Healing, I&O, Mental Status/Confusion, Weight, Pertinent Labs, Monitor Bowel Function      Electronically signed by Rosi Duff MS, RD, LD on 12/13/18 at 1:38 PM    Contact Number: [phone]

## 2018-12-13 NOTE — PROGRESS NOTES
Pharmacy Consultation Note  (Antibiotic Dosing and Monitoring)    Initial consult date: 18  Consulting physician: Dr. Cruz Mcclure  Drug(s): Vancomycin  Indication: Bacteremia    Ht Readings from Last 1 Encounters:   18 4' 11\" (1.499 m)     Wt Readings from Last 1 Encounters:   18 142 lb 6.4 oz (64.6 kg)       Age/  Gender IBW DW  Allergy Information   72 y.o. female  63.5 kg  Ativan [lorazepam]; Cashews [macadamia nut oil]; Other; and Trazodone and nefazodone                 Date  WBC BUN/CR Drug/Dose Time   Given Level(s)   (Time) Comments     Day 1 8.6  () 7/0.2  () Vancomycin 1250 mg IV x1 1605     12/10 -- -- Vancomycin 1,000 mg IV Q24H 1504      11.8 8/0.2 Vancomycin 1,000 mg IV Q24H 1548      13.5 8/0.3 Vancomycin 1,250 mg IV Q24H 1656 9.3 mcg/mL @ 1550 Hold dose if trough is >20 mcg/mL    13.4 5/0.2 Vancomycin 1,250 mg IV Q24H <1700>       CrCl cannot be calculated (Unknown ideal weight.). Intake/Output Summary (Last 24 hours) at 18 1303  Last data filed at 18 8919   Gross per 24 hour   Intake          4718.84 ml   Output              425 ml   Net          4293.84 ml       Temp max: Temp (24hrs), Av.1 °F (37.3 °C), Min:98.2 °F (36.8 °C), Max:100.2 °F (37.9 °C)      Cultures:  available culture and sensitivity results were reviewed in EPIC  · : Blood Culture #1: Staph epidermidis (S) vanco, doxy, bactrim (R) clinda, pcn  · : Blood Culture #2: NGTD  · : Blood Culture #1: Staph epidermidis  · : Blood Culture #2: NGTD  · : Resp Film Array: Negative  · 12/10: Blood culture: NGTD    Assessment:  · Consulted by Dr. Cruz Mcclure to dose/monitor vancomycin  · Goal trough level:  15-20 mcg/mL  · 72 YOF with Pneumonia and now bacteremia.  Patient with metastatic lung cancer, Hospice saw patient, family would like sepsis treated before considering Hospice  · SCr 0.2 today  · 12/10: patient remains full code at this time; blood cx growing

## 2018-12-14 NOTE — PROGRESS NOTES
Dr Manda Mendez And Dr Abrahan Fuentes aware of patient expiring telephone pronounced will be up to sign death certificate.

## 2018-12-15 LAB
BLOOD CULTURE, ROUTINE: NORMAL
MRSA CULTURE ONLY: NORMAL

## 2018-12-16 LAB — CULTURE, BLOOD 2: NORMAL

## 2019-01-04 NOTE — PROGRESS NOTES
No Gentamicin dose ordered today.  Patient discharged per wheelchair with daughter.    Patient oriented to room and new admission folder given.  Patient Guide reviewed and patient given an explanation of Rights And Responsibilities Important message from medicare signed and copy given to patient  Camryn Kavya 6/8/2018 5:18 PM
